# Patient Record
Sex: FEMALE | Race: WHITE | Employment: OTHER | URBAN - METROPOLITAN AREA
[De-identification: names, ages, dates, MRNs, and addresses within clinical notes are randomized per-mention and may not be internally consistent; named-entity substitution may affect disease eponyms.]

---

## 2018-08-06 ENCOUNTER — HOSPITAL ENCOUNTER (OUTPATIENT)
Dept: ULTRASOUND IMAGING | Age: 73
Discharge: HOME | End: 2018-08-08
Payer: COMMERCIAL

## 2018-08-06 DIAGNOSIS — D50.9 IRON DEFICIENCY ANEMIA, UNSPECIFIED IRON DEFICIENCY ANEMIA TYPE: ICD-10-CM

## 2018-08-06 DIAGNOSIS — I73.9 PERIPHERAL VASCULAR DISEASE, UNSPECIFIED (HCC): ICD-10-CM

## 2018-08-06 DIAGNOSIS — E78.5 HYPERLIPIDEMIA, UNSPECIFIED HYPERLIPIDEMIA TYPE: ICD-10-CM

## 2018-08-06 PROCEDURE — 93975 VASCULAR STUDY: CPT

## 2018-08-06 PROCEDURE — 76770 US EXAM ABDO BACK WALL COMP: CPT

## 2019-04-25 ENCOUNTER — HOSPITAL ENCOUNTER (OUTPATIENT)
Dept: ULTRASOUND IMAGING | Age: 74
Discharge: HOME | End: 2019-04-27
Payer: COMMERCIAL

## 2019-04-25 DIAGNOSIS — N18.9 CHRONIC KIDNEY DISEASE, UNSPECIFIED CKD STAGE: ICD-10-CM

## 2019-04-25 PROCEDURE — 76770 US EXAM ABDO BACK WALL COMP: CPT

## 2019-10-30 ENCOUNTER — HOSPITAL ENCOUNTER (OUTPATIENT)
Dept: GENERAL RADIOLOGY | Age: 74
Discharge: HOME | End: 2019-11-01
Payer: COMMERCIAL

## 2019-10-30 ENCOUNTER — HOSPITAL ENCOUNTER (OUTPATIENT)
Age: 74
Discharge: HOME | End: 2019-11-01
Payer: COMMERCIAL

## 2019-10-30 DIAGNOSIS — R05.9 COUGH: ICD-10-CM

## 2019-10-30 PROCEDURE — 71046 X-RAY EXAM CHEST 2 VIEWS: CPT

## 2019-11-09 ENCOUNTER — APPOINTMENT (OUTPATIENT)
Dept: GENERAL RADIOLOGY | Age: 74
DRG: 683 | End: 2019-11-09
Payer: COMMERCIAL

## 2019-11-09 ENCOUNTER — HOSPITAL ENCOUNTER (INPATIENT)
Age: 74
LOS: 3 days | Discharge: HOME HEALTH | DRG: 683 | End: 2019-11-12
Attending: EMERGENCY MEDICINE | Admitting: INTERNAL MEDICINE
Payer: COMMERCIAL

## 2019-11-09 DIAGNOSIS — R53.1 GENERAL WEAKNESS: Primary | ICD-10-CM

## 2019-11-09 DIAGNOSIS — N17.9 AKI (ACUTE KIDNEY INJURY) (HCC): ICD-10-CM

## 2019-11-09 LAB
ANION GAP SERPL CALCULATED.3IONS-SCNC: 19 MMOL/L (ref 7–16)
BACTERIA: NORMAL /HPF
BASOPHILS ABSOLUTE: 0.05 E9/L (ref 0–0.2)
BASOPHILS RELATIVE PERCENT: 0.3 % (ref 0–2)
BILIRUBIN URINE: NEGATIVE
BLOOD, URINE: NEGATIVE
BUN BLDV-MCNC: 50 MG/DL (ref 8–23)
CALCIUM SERPL-MCNC: 10 MG/DL (ref 8.6–10.2)
CHLORIDE BLD-SCNC: 103 MMOL/L (ref 98–107)
CLARITY: CLEAR
CO2: 19 MMOL/L (ref 22–29)
COLOR: YELLOW
CREAT SERPL-MCNC: 2.5 MG/DL (ref 0.5–1)
EOSINOPHILS ABSOLUTE: 0 E9/L (ref 0.05–0.5)
EOSINOPHILS RELATIVE PERCENT: 0 % (ref 0–6)
GFR AFRICAN AMERICAN: 23
GFR NON-AFRICAN AMERICAN: 19 ML/MIN/1.73
GLUCOSE BLD-MCNC: 147 MG/DL (ref 74–99)
GLUCOSE URINE: NEGATIVE MG/DL
HCT VFR BLD CALC: 36.9 % (ref 34–48)
HEMOGLOBIN: 11.4 G/DL (ref 11.5–15.5)
IMMATURE GRANULOCYTES #: 0.15 E9/L
IMMATURE GRANULOCYTES %: 1 % (ref 0–5)
INFLUENZA A BY PCR: NOT DETECTED
INFLUENZA B BY PCR: NOT DETECTED
KETONES, URINE: NEGATIVE MG/DL
LEUKOCYTE ESTERASE, URINE: NEGATIVE
LYMPHOCYTES ABSOLUTE: 1.09 E9/L (ref 1.5–4)
LYMPHOCYTES RELATIVE PERCENT: 7.6 % (ref 20–42)
MCH RBC QN AUTO: 26.6 PG (ref 26–35)
MCHC RBC AUTO-ENTMCNC: 30.9 % (ref 32–34.5)
MCV RBC AUTO: 86 FL (ref 80–99.9)
MONOCYTES ABSOLUTE: 0.47 E9/L (ref 0.1–0.95)
MONOCYTES RELATIVE PERCENT: 3.3 % (ref 2–12)
NEUTROPHILS ABSOLUTE: 12.57 E9/L (ref 1.8–7.3)
NEUTROPHILS RELATIVE PERCENT: 87.8 % (ref 43–80)
NITRITE, URINE: NEGATIVE
PDW BLD-RTO: 17.2 FL (ref 11.5–15)
PH UA: 5 (ref 5–9)
PLATELET # BLD: 257 E9/L (ref 130–450)
PMV BLD AUTO: 10.1 FL (ref 7–12)
POTASSIUM SERPL-SCNC: 5 MMOL/L (ref 3.5–5)
PROTEIN UA: NORMAL MG/DL
RBC # BLD: 4.29 E12/L (ref 3.5–5.5)
RBC UA: NORMAL /HPF (ref 0–2)
SODIUM BLD-SCNC: 141 MMOL/L (ref 132–146)
SPECIFIC GRAVITY UA: >=1.03 (ref 1–1.03)
TROPONIN: <0.01 NG/ML (ref 0–0.03)
TSH SERPL DL<=0.05 MIU/L-ACNC: 2.79 UIU/ML (ref 0.27–4.2)
UROBILINOGEN, URINE: 0.2 E.U./DL
WBC # BLD: 14.3 E9/L (ref 4.5–11.5)
WBC UA: NORMAL /HPF (ref 0–5)

## 2019-11-09 PROCEDURE — 80048 BASIC METABOLIC PNL TOTAL CA: CPT

## 2019-11-09 PROCEDURE — 6360000002 HC RX W HCPCS: Performed by: INTERNAL MEDICINE

## 2019-11-09 PROCEDURE — 84443 ASSAY THYROID STIM HORMONE: CPT

## 2019-11-09 PROCEDURE — 96372 THER/PROPH/DIAG INJ SC/IM: CPT

## 2019-11-09 PROCEDURE — 84484 ASSAY OF TROPONIN QUANT: CPT

## 2019-11-09 PROCEDURE — 87502 INFLUENZA DNA AMP PROBE: CPT

## 2019-11-09 PROCEDURE — 36415 COLL VENOUS BLD VENIPUNCTURE: CPT

## 2019-11-09 PROCEDURE — 96360 HYDRATION IV INFUSION INIT: CPT

## 2019-11-09 PROCEDURE — 81001 URINALYSIS AUTO W/SCOPE: CPT

## 2019-11-09 PROCEDURE — 93005 ELECTROCARDIOGRAM TRACING: CPT | Performed by: PREVENTIVE MEDICINE

## 2019-11-09 PROCEDURE — 2580000003 HC RX 258: Performed by: INTERNAL MEDICINE

## 2019-11-09 PROCEDURE — 85025 COMPLETE CBC W/AUTO DIFF WBC: CPT

## 2019-11-09 PROCEDURE — 99285 EMERGENCY DEPT VISIT HI MDM: CPT

## 2019-11-09 PROCEDURE — 96361 HYDRATE IV INFUSION ADD-ON: CPT

## 2019-11-09 PROCEDURE — 1200000000 HC SEMI PRIVATE

## 2019-11-09 PROCEDURE — 2580000003 HC RX 258: Performed by: PREVENTIVE MEDICINE

## 2019-11-09 PROCEDURE — 71045 X-RAY EXAM CHEST 1 VIEW: CPT

## 2019-11-09 PROCEDURE — 6370000000 HC RX 637 (ALT 250 FOR IP): Performed by: INTERNAL MEDICINE

## 2019-11-09 PROCEDURE — G0378 HOSPITAL OBSERVATION PER HR: HCPCS

## 2019-11-09 RX ORDER — SODIUM CHLORIDE 9 MG/ML
INJECTION, SOLUTION INTRAVENOUS CONTINUOUS
Status: DISCONTINUED | OUTPATIENT
Start: 2019-11-09 | End: 2019-11-12 | Stop reason: HOSPADM

## 2019-11-09 RX ORDER — DEXTROSE MONOHYDRATE 50 MG/ML
100 INJECTION, SOLUTION INTRAVENOUS PRN
Status: DISCONTINUED | OUTPATIENT
Start: 2019-11-09 | End: 2019-11-12 | Stop reason: HOSPADM

## 2019-11-09 RX ORDER — SIMVASTATIN 20 MG
20 TABLET ORAL NIGHTLY
Status: DISCONTINUED | OUTPATIENT
Start: 2019-11-09 | End: 2019-11-12 | Stop reason: HOSPADM

## 2019-11-09 RX ORDER — DOXYCYCLINE HYCLATE 100 MG/1
100 CAPSULE ORAL 2 TIMES DAILY
Status: ON HOLD | COMMUNITY
End: 2019-11-12 | Stop reason: HOSPADM

## 2019-11-09 RX ORDER — SODIUM CHLORIDE 0.9 % (FLUSH) 0.9 %
10 SYRINGE (ML) INJECTION PRN
Status: DISCONTINUED | OUTPATIENT
Start: 2019-11-09 | End: 2019-11-12 | Stop reason: HOSPADM

## 2019-11-09 RX ORDER — CODEINE/BUTALBITAL/ASA/CAFFEIN 30-50-325
1 CAPSULE ORAL ONCE
Status: DISCONTINUED | OUTPATIENT
Start: 2019-11-09 | End: 2019-11-09 | Stop reason: CLARIF

## 2019-11-09 RX ORDER — PANTOPRAZOLE SODIUM 20 MG/1
20 TABLET, DELAYED RELEASE ORAL DAILY
Status: DISCONTINUED | OUTPATIENT
Start: 2019-11-10 | End: 2019-11-12 | Stop reason: HOSPADM

## 2019-11-09 RX ORDER — MIRTAZAPINE 15 MG/1
15 TABLET, ORALLY DISINTEGRATING ORAL NIGHTLY
Status: ON HOLD | COMMUNITY
End: 2020-09-29 | Stop reason: DRUGHIGH

## 2019-11-09 RX ORDER — CODEINE/BUTALBITAL/ASA/CAFFEIN 30-50-325
1 CAPSULE ORAL EVERY 4 HOURS PRN
Status: DISCONTINUED | OUTPATIENT
Start: 2019-11-09 | End: 2019-11-12 | Stop reason: HOSPADM

## 2019-11-09 RX ORDER — 0.9 % SODIUM CHLORIDE 0.9 %
500 INTRAVENOUS SOLUTION INTRAVENOUS ONCE
Status: COMPLETED | OUTPATIENT
Start: 2019-11-09 | End: 2019-11-09

## 2019-11-09 RX ORDER — NICOTINE POLACRILEX 4 MG
15 LOZENGE BUCCAL PRN
Status: DISCONTINUED | OUTPATIENT
Start: 2019-11-09 | End: 2019-11-12 | Stop reason: HOSPADM

## 2019-11-09 RX ORDER — LEVOTHYROXINE SODIUM 88 UG/1
88 TABLET ORAL DAILY
Status: DISCONTINUED | OUTPATIENT
Start: 2019-11-10 | End: 2019-11-12 | Stop reason: HOSPADM

## 2019-11-09 RX ORDER — HEPARIN SODIUM 5000 [USP'U]/ML
5000 INJECTION, SOLUTION INTRAVENOUS; SUBCUTANEOUS EVERY 8 HOURS SCHEDULED
Status: DISCONTINUED | OUTPATIENT
Start: 2019-11-09 | End: 2019-11-12 | Stop reason: HOSPADM

## 2019-11-09 RX ORDER — ROPINIROLE 1 MG/1
1 TABLET, FILM COATED ORAL NIGHTLY
Status: ON HOLD | COMMUNITY
End: 2020-09-29 | Stop reason: ALTCHOICE

## 2019-11-09 RX ORDER — SODIUM CHLORIDE 0.9 % (FLUSH) 0.9 %
10 SYRINGE (ML) INJECTION EVERY 12 HOURS SCHEDULED
Status: DISCONTINUED | OUTPATIENT
Start: 2019-11-09 | End: 2019-11-12 | Stop reason: HOSPADM

## 2019-11-09 RX ORDER — ROPINIROLE 1 MG/1
1 TABLET, FILM COATED ORAL NIGHTLY
Status: DISCONTINUED | OUTPATIENT
Start: 2019-11-09 | End: 2019-11-12 | Stop reason: HOSPADM

## 2019-11-09 RX ORDER — DEXTROSE MONOHYDRATE 25 G/50ML
12.5 INJECTION, SOLUTION INTRAVENOUS PRN
Status: DISCONTINUED | OUTPATIENT
Start: 2019-11-09 | End: 2019-11-12 | Stop reason: HOSPADM

## 2019-11-09 RX ADMIN — ROPINIROLE HYDROCHLORIDE 1 MG: 1 TABLET, FILM COATED ORAL at 23:27

## 2019-11-09 RX ADMIN — HEPARIN SODIUM 5000 UNITS: 5000 INJECTION INTRAVENOUS; SUBCUTANEOUS at 23:27

## 2019-11-09 RX ADMIN — SODIUM CHLORIDE: 9 INJECTION, SOLUTION INTRAVENOUS at 23:27

## 2019-11-09 RX ADMIN — Medication 10 ML: at 23:27

## 2019-11-09 RX ADMIN — SIMVASTATIN 20 MG: 20 TABLET, FILM COATED ORAL at 23:27

## 2019-11-09 RX ADMIN — SODIUM CHLORIDE 500 ML: 9 INJECTION, SOLUTION INTRAVENOUS at 17:57

## 2019-11-09 ASSESSMENT — ENCOUNTER SYMPTOMS
ABDOMINAL PAIN: 0
CONSTIPATION: 0
VOMITING: 0
SHORTNESS OF BREATH: 0
ALLERGIC/IMMUNOLOGIC NEGATIVE: 1
NAUSEA: 0
CHEST TIGHTNESS: 0
COUGH: 0
DIARRHEA: 0

## 2019-11-09 ASSESSMENT — PAIN SCALES - GENERAL: PAINLEVEL_OUTOF10: 5

## 2019-11-09 ASSESSMENT — PAIN - FUNCTIONAL ASSESSMENT: PAIN_FUNCTIONAL_ASSESSMENT: PREVENTS OR INTERFERES SOME ACTIVE ACTIVITIES AND ADLS

## 2019-11-09 ASSESSMENT — PAIN DESCRIPTION - LOCATION: LOCATION: HEAD

## 2019-11-09 ASSESSMENT — PAIN DESCRIPTION - FREQUENCY: FREQUENCY: CONTINUOUS

## 2019-11-09 ASSESSMENT — PAIN DESCRIPTION - ONSET: ONSET: ON-GOING

## 2019-11-09 ASSESSMENT — PAIN DESCRIPTION - DESCRIPTORS: DESCRIPTORS: ACHING;DISCOMFORT

## 2019-11-09 ASSESSMENT — PAIN DESCRIPTION - PAIN TYPE: TYPE: ACUTE PAIN

## 2019-11-10 ENCOUNTER — APPOINTMENT (OUTPATIENT)
Dept: CT IMAGING | Age: 74
DRG: 683 | End: 2019-11-10
Payer: COMMERCIAL

## 2019-11-10 LAB
ALBUMIN SERPL-MCNC: 3.2 G/DL (ref 3.5–5.2)
ALP BLD-CCNC: 154 U/L (ref 35–104)
ALT SERPL-CCNC: 12 U/L (ref 0–32)
ANION GAP SERPL CALCULATED.3IONS-SCNC: 13 MMOL/L (ref 7–16)
AST SERPL-CCNC: 17 U/L (ref 0–31)
BILIRUB SERPL-MCNC: 0.2 MG/DL (ref 0–1.2)
BILIRUBIN DIRECT: <0.2 MG/DL (ref 0–0.3)
BILIRUBIN, INDIRECT: ABNORMAL MG/DL (ref 0–1)
BUN BLDV-MCNC: 45 MG/DL (ref 8–23)
CALCIUM SERPL-MCNC: 8.6 MG/DL (ref 8.6–10.2)
CHLORIDE BLD-SCNC: 105 MMOL/L (ref 98–107)
CO2: 19 MMOL/L (ref 22–29)
CREAT SERPL-MCNC: 2.1 MG/DL (ref 0.5–1)
EKG ATRIAL RATE: 82 BPM
EKG P AXIS: 61 DEGREES
EKG P-R INTERVAL: 162 MS
EKG Q-T INTERVAL: 346 MS
EKG QRS DURATION: 72 MS
EKG QTC CALCULATION (BAZETT): 404 MS
EKG R AXIS: 4 DEGREES
EKG T AXIS: 53 DEGREES
EKG VENTRICULAR RATE: 82 BPM
GFR AFRICAN AMERICAN: 28
GFR NON-AFRICAN AMERICAN: 23 ML/MIN/1.73
GLUCOSE BLD-MCNC: 102 MG/DL (ref 74–99)
HCT VFR BLD CALC: 28.6 % (ref 34–48)
HEMOGLOBIN: 9.2 G/DL (ref 11.5–15.5)
MAGNESIUM: 2.2 MG/DL (ref 1.6–2.6)
MCH RBC QN AUTO: 26.9 PG (ref 26–35)
MCHC RBC AUTO-ENTMCNC: 32.2 % (ref 32–34.5)
MCV RBC AUTO: 83.6 FL (ref 80–99.9)
PDW BLD-RTO: 16.8 FL (ref 11.5–15)
PHOSPHORUS: 4.2 MG/DL (ref 2.5–4.5)
PLATELET # BLD: 184 E9/L (ref 130–450)
PMV BLD AUTO: 10.5 FL (ref 7–12)
POTASSIUM SERPL-SCNC: 4.2 MMOL/L (ref 3.5–5)
RBC # BLD: 3.42 E12/L (ref 3.5–5.5)
SODIUM BLD-SCNC: 137 MMOL/L (ref 132–146)
TOTAL PROTEIN: 5.7 G/DL (ref 6.4–8.3)
TSH SERPL DL<=0.05 MIU/L-ACNC: 1.51 UIU/ML (ref 0.27–4.2)
WBC # BLD: 12 E9/L (ref 4.5–11.5)

## 2019-11-10 PROCEDURE — 80076 HEPATIC FUNCTION PANEL: CPT

## 2019-11-10 PROCEDURE — G0378 HOSPITAL OBSERVATION PER HR: HCPCS

## 2019-11-10 PROCEDURE — 84100 ASSAY OF PHOSPHORUS: CPT

## 2019-11-10 PROCEDURE — 6370000000 HC RX 637 (ALT 250 FOR IP): Performed by: INTERNAL MEDICINE

## 2019-11-10 PROCEDURE — 85027 COMPLETE CBC AUTOMATED: CPT

## 2019-11-10 PROCEDURE — 90653 IIV ADJUVANT VACCINE IM: CPT | Performed by: INTERNAL MEDICINE

## 2019-11-10 PROCEDURE — 2580000003 HC RX 258: Performed by: INTERNAL MEDICINE

## 2019-11-10 PROCEDURE — 96372 THER/PROPH/DIAG INJ SC/IM: CPT

## 2019-11-10 PROCEDURE — G0008 ADMIN INFLUENZA VIRUS VAC: HCPCS | Performed by: INTERNAL MEDICINE

## 2019-11-10 PROCEDURE — 80048 BASIC METABOLIC PNL TOTAL CA: CPT

## 2019-11-10 PROCEDURE — 97116 GAIT TRAINING THERAPY: CPT | Performed by: PHYSICAL THERAPIST

## 2019-11-10 PROCEDURE — 0100U HC RESPIRPTHGN MULT REV TRANS & AMP PRB TECH 21 TRGT: CPT

## 2019-11-10 PROCEDURE — 36415 COLL VENOUS BLD VENIPUNCTURE: CPT

## 2019-11-10 PROCEDURE — 84443 ASSAY THYROID STIM HORMONE: CPT

## 2019-11-10 PROCEDURE — 6360000002 HC RX W HCPCS: Performed by: INTERNAL MEDICINE

## 2019-11-10 PROCEDURE — 6360000004 HC RX CONTRAST MEDICATION: Performed by: RADIOLOGY

## 2019-11-10 PROCEDURE — 83735 ASSAY OF MAGNESIUM: CPT

## 2019-11-10 PROCEDURE — 1200000000 HC SEMI PRIVATE

## 2019-11-10 PROCEDURE — 74176 CT ABD & PELVIS W/O CONTRAST: CPT

## 2019-11-10 PROCEDURE — 97161 PT EVAL LOW COMPLEX 20 MIN: CPT | Performed by: PHYSICAL THERAPIST

## 2019-11-10 RX ORDER — SODIUM BICARBONATE 650 MG/1
650 TABLET ORAL 2 TIMES DAILY
Status: COMPLETED | OUTPATIENT
Start: 2019-11-10 | End: 2019-11-11

## 2019-11-10 RX ORDER — MIRTAZAPINE 15 MG/1
15 TABLET, ORALLY DISINTEGRATING ORAL NIGHTLY
Status: DISCONTINUED | OUTPATIENT
Start: 2019-11-10 | End: 2019-11-12 | Stop reason: HOSPADM

## 2019-11-10 RX ADMIN — SIMVASTATIN 20 MG: 20 TABLET, FILM COATED ORAL at 21:30

## 2019-11-10 RX ADMIN — HEPARIN SODIUM 5000 UNITS: 5000 INJECTION INTRAVENOUS; SUBCUTANEOUS at 21:31

## 2019-11-10 RX ADMIN — ROPINIROLE HYDROCHLORIDE 1 MG: 1 TABLET, FILM COATED ORAL at 21:30

## 2019-11-10 RX ADMIN — MIRTAZAPINE 15 MG: 15 TABLET, ORALLY DISINTEGRATING ORAL at 21:30

## 2019-11-10 RX ADMIN — SODIUM CHLORIDE: 9 INJECTION, SOLUTION INTRAVENOUS at 14:39

## 2019-11-10 RX ADMIN — HEPARIN SODIUM 5000 UNITS: 5000 INJECTION INTRAVENOUS; SUBCUTANEOUS at 14:35

## 2019-11-10 RX ADMIN — LEVOTHYROXINE SODIUM 88 MCG: 88 TABLET ORAL at 06:07

## 2019-11-10 RX ADMIN — Medication 1 CAPSULE: at 21:28

## 2019-11-10 RX ADMIN — SODIUM BICARBONATE 650 MG TABLET 650 MG: at 21:30

## 2019-11-10 RX ADMIN — HEPARIN SODIUM 5000 UNITS: 5000 INJECTION INTRAVENOUS; SUBCUTANEOUS at 06:07

## 2019-11-10 RX ADMIN — PANTOPRAZOLE SODIUM 20 MG: 20 TABLET, DELAYED RELEASE ORAL at 08:34

## 2019-11-10 RX ADMIN — Medication 1 CAPSULE: at 00:01

## 2019-11-10 RX ADMIN — SODIUM BICARBONATE 650 MG TABLET 650 MG: at 10:48

## 2019-11-10 RX ADMIN — SODIUM CHLORIDE: 9 INJECTION, SOLUTION INTRAVENOUS at 06:08

## 2019-11-10 RX ADMIN — IOHEXOL 50 ML: 240 INJECTION, SOLUTION INTRATHECAL; INTRAVASCULAR; INTRAVENOUS; ORAL at 15:18

## 2019-11-10 RX ADMIN — INFLUENZA VACCINE, ADJUVANTED 0.5 ML: 15; 15; 15 INJECTION, SUSPENSION INTRAMUSCULAR at 08:34

## 2019-11-10 RX ADMIN — Medication 1 CAPSULE: at 08:51

## 2019-11-10 ASSESSMENT — PAIN DESCRIPTION - FREQUENCY
FREQUENCY: INTERMITTENT
FREQUENCY: CONTINUOUS

## 2019-11-10 ASSESSMENT — PAIN DESCRIPTION - DESCRIPTORS
DESCRIPTORS: HEADACHE
DESCRIPTORS: ACHING;DISCOMFORT;HEADACHE
DESCRIPTORS: ACHING;DISCOMFORT;DULL

## 2019-11-10 ASSESSMENT — PAIN DESCRIPTION - ONSET
ONSET: ON-GOING
ONSET: ON-GOING
ONSET: GRADUAL
ONSET: GRADUAL

## 2019-11-10 ASSESSMENT — PAIN DESCRIPTION - ORIENTATION
ORIENTATION: INNER

## 2019-11-10 ASSESSMENT — PAIN DESCRIPTION - LOCATION
LOCATION: HEAD
LOCATION: ABDOMEN;HEAD

## 2019-11-10 ASSESSMENT — PAIN - FUNCTIONAL ASSESSMENT
PAIN_FUNCTIONAL_ASSESSMENT: PREVENTS OR INTERFERES SOME ACTIVE ACTIVITIES AND ADLS

## 2019-11-10 ASSESSMENT — PAIN SCALES - GENERAL
PAINLEVEL_OUTOF10: 6
PAINLEVEL_OUTOF10: 3
PAINLEVEL_OUTOF10: 7
PAINLEVEL_OUTOF10: 5
PAINLEVEL_OUTOF10: 5
PAINLEVEL_OUTOF10: 6
PAINLEVEL_OUTOF10: 3

## 2019-11-10 ASSESSMENT — PAIN DESCRIPTION - PAIN TYPE
TYPE: CHRONIC PAIN
TYPE: ACUTE PAIN;CHRONIC PAIN
TYPE: CHRONIC PAIN
TYPE: CHRONIC PAIN

## 2019-11-10 ASSESSMENT — PAIN DESCRIPTION - PROGRESSION
CLINICAL_PROGRESSION: OTHER (COMMENT)
CLINICAL_PROGRESSION: NOT CHANGED

## 2019-11-11 LAB
ADENOVIRUS BY PCR: NOT DETECTED
ANION GAP SERPL CALCULATED.3IONS-SCNC: 13 MMOL/L (ref 7–16)
BORDETELLA PARAPERTUSSIS BY PCR: NOT DETECTED
BORDETELLA PERTUSSIS BY PCR: NOT DETECTED
BUN BLDV-MCNC: 31 MG/DL (ref 8–23)
CALCIUM SERPL-MCNC: 8.1 MG/DL (ref 8.6–10.2)
CHLAMYDOPHILIA PNEUMONIAE BY PCR: NOT DETECTED
CHLORIDE BLD-SCNC: 107 MMOL/L (ref 98–107)
CO2: 20 MMOL/L (ref 22–29)
CORONAVIRUS 229E BY PCR: NOT DETECTED
CORONAVIRUS HKU1 BY PCR: NOT DETECTED
CORONAVIRUS NL63 BY PCR: NOT DETECTED
CORONAVIRUS OC43 BY PCR: NOT DETECTED
CREAT SERPL-MCNC: 1.7 MG/DL (ref 0.5–1)
FERRITIN: 23 NG/ML
FOLATE: 11 NG/ML (ref 4.8–24.2)
GFR AFRICAN AMERICAN: 35
GFR NON-AFRICAN AMERICAN: 29 ML/MIN/1.73
GLUCOSE BLD-MCNC: 100 MG/DL (ref 74–99)
HCT VFR BLD CALC: 25.5 % (ref 34–48)
HCT VFR BLD CALC: 27.5 % (ref 34–48)
HEMOGLOBIN: 8 G/DL (ref 11.5–15.5)
HEMOGLOBIN: 8.5 G/DL (ref 11.5–15.5)
HUMAN METAPNEUMOVIRUS BY PCR: NOT DETECTED
HUMAN RHINOVIRUS/ENTEROVIRUS BY PCR: NOT DETECTED
IMMATURE RETIC FRACT: 21 % (ref 3–15.9)
INFLUENZA A BY PCR: NOT DETECTED
INFLUENZA B BY PCR: NOT DETECTED
IRON SATURATION: 13 % (ref 15–50)
IRON: 26 MCG/DL (ref 37–145)
LV EF: 81 %
LVEF MODALITY: NORMAL
MCH RBC QN AUTO: 26.9 PG (ref 26–35)
MCHC RBC AUTO-ENTMCNC: 31.4 % (ref 32–34.5)
MCV RBC AUTO: 85.9 FL (ref 80–99.9)
MYCOPLASMA PNEUMONIAE BY PCR: NOT DETECTED
PARAINFLUENZA VIRUS 1 BY PCR: NOT DETECTED
PARAINFLUENZA VIRUS 2 BY PCR: NOT DETECTED
PARAINFLUENZA VIRUS 3 BY PCR: NOT DETECTED
PARAINFLUENZA VIRUS 4 BY PCR: NOT DETECTED
PDW BLD-RTO: 17.2 FL (ref 11.5–15)
PLATELET # BLD: 173 E9/L (ref 130–450)
PMV BLD AUTO: 11.1 FL (ref 7–12)
POTASSIUM SERPL-SCNC: 3.7 MMOL/L (ref 3.5–5)
RBC # BLD: 2.97 E12/L (ref 3.5–5.5)
RESPIRATORY SYNCYTIAL VIRUS BY PCR: NOT DETECTED
RETIC HGB EQUIVALENT: 35.1 PG (ref 28.2–36.6)
RETICULOCYTE ABSOLUTE COUNT: 0.06 E12/L
RETICULOCYTE COUNT PCT: 1.7 % (ref 0.4–1.9)
SODIUM BLD-SCNC: 140 MMOL/L (ref 132–146)
TOTAL IRON BINDING CAPACITY: 202 MCG/DL (ref 250–450)
VITAMIN B-12: 1362 PG/ML (ref 211–946)
WBC # BLD: 7.5 E9/L (ref 4.5–11.5)

## 2019-11-11 PROCEDURE — G0378 HOSPITAL OBSERVATION PER HR: HCPCS

## 2019-11-11 PROCEDURE — 97530 THERAPEUTIC ACTIVITIES: CPT

## 2019-11-11 PROCEDURE — 85018 HEMOGLOBIN: CPT

## 2019-11-11 PROCEDURE — 2580000003 HC RX 258: Performed by: INTERNAL MEDICINE

## 2019-11-11 PROCEDURE — 93306 TTE W/DOPPLER COMPLETE: CPT

## 2019-11-11 PROCEDURE — 97116 GAIT TRAINING THERAPY: CPT

## 2019-11-11 PROCEDURE — 6370000000 HC RX 637 (ALT 250 FOR IP): Performed by: INTERNAL MEDICINE

## 2019-11-11 PROCEDURE — 82607 VITAMIN B-12: CPT

## 2019-11-11 PROCEDURE — 83550 IRON BINDING TEST: CPT

## 2019-11-11 PROCEDURE — 85045 AUTOMATED RETICULOCYTE COUNT: CPT

## 2019-11-11 PROCEDURE — 85027 COMPLETE CBC AUTOMATED: CPT

## 2019-11-11 PROCEDURE — 83540 ASSAY OF IRON: CPT

## 2019-11-11 PROCEDURE — 80048 BASIC METABOLIC PNL TOTAL CA: CPT

## 2019-11-11 PROCEDURE — 6360000002 HC RX W HCPCS: Performed by: INTERNAL MEDICINE

## 2019-11-11 PROCEDURE — 82746 ASSAY OF FOLIC ACID SERUM: CPT

## 2019-11-11 PROCEDURE — 97535 SELF CARE MNGMENT TRAINING: CPT

## 2019-11-11 PROCEDURE — 96372 THER/PROPH/DIAG INJ SC/IM: CPT

## 2019-11-11 PROCEDURE — 85014 HEMATOCRIT: CPT

## 2019-11-11 PROCEDURE — 97165 OT EVAL LOW COMPLEX 30 MIN: CPT

## 2019-11-11 PROCEDURE — 82728 ASSAY OF FERRITIN: CPT

## 2019-11-11 PROCEDURE — 36415 COLL VENOUS BLD VENIPUNCTURE: CPT

## 2019-11-11 PROCEDURE — 1200000000 HC SEMI PRIVATE

## 2019-11-11 RX ADMIN — HEPARIN SODIUM 5000 UNITS: 5000 INJECTION INTRAVENOUS; SUBCUTANEOUS at 06:16

## 2019-11-11 RX ADMIN — SODIUM CHLORIDE: 9 INJECTION, SOLUTION INTRAVENOUS at 16:10

## 2019-11-11 RX ADMIN — SODIUM CHLORIDE: 9 INJECTION, SOLUTION INTRAVENOUS at 03:15

## 2019-11-11 RX ADMIN — Medication 1 CAPSULE: at 03:11

## 2019-11-11 RX ADMIN — PANTOPRAZOLE SODIUM 20 MG: 20 TABLET, DELAYED RELEASE ORAL at 08:03

## 2019-11-11 RX ADMIN — HEPARIN SODIUM 5000 UNITS: 5000 INJECTION INTRAVENOUS; SUBCUTANEOUS at 21:36

## 2019-11-11 RX ADMIN — Medication 1 CAPSULE: at 14:49

## 2019-11-11 RX ADMIN — SODIUM BICARBONATE 650 MG TABLET 650 MG: at 21:32

## 2019-11-11 RX ADMIN — ROPINIROLE HYDROCHLORIDE 1 MG: 1 TABLET, FILM COATED ORAL at 21:32

## 2019-11-11 RX ADMIN — MIRTAZAPINE 15 MG: 15 TABLET, ORALLY DISINTEGRATING ORAL at 21:35

## 2019-11-11 RX ADMIN — HEPARIN SODIUM 5000 UNITS: 5000 INJECTION INTRAVENOUS; SUBCUTANEOUS at 14:52

## 2019-11-11 RX ADMIN — SIMVASTATIN 20 MG: 20 TABLET, FILM COATED ORAL at 21:32

## 2019-11-11 RX ADMIN — Medication 1 CAPSULE: at 21:33

## 2019-11-11 RX ADMIN — LEVOTHYROXINE SODIUM 88 MCG: 88 TABLET ORAL at 06:17

## 2019-11-11 RX ADMIN — SODIUM BICARBONATE 650 MG TABLET 650 MG: at 08:03

## 2019-11-11 ASSESSMENT — PAIN SCALES - GENERAL
PAINLEVEL_OUTOF10: 0
PAINLEVEL_OUTOF10: 8
PAINLEVEL_OUTOF10: 8
PAINLEVEL_OUTOF10: 2
PAINLEVEL_OUTOF10: 0
PAINLEVEL_OUTOF10: 9

## 2019-11-12 VITALS
TEMPERATURE: 97.7 F | RESPIRATION RATE: 18 BRPM | HEART RATE: 65 BPM | OXYGEN SATURATION: 96 % | DIASTOLIC BLOOD PRESSURE: 68 MMHG | BODY MASS INDEX: 20.65 KG/M2 | WEIGHT: 112.2 LBS | SYSTOLIC BLOOD PRESSURE: 142 MMHG | HEIGHT: 62 IN

## 2019-11-12 LAB
ANION GAP SERPL CALCULATED.3IONS-SCNC: 10 MMOL/L (ref 7–16)
BUN BLDV-MCNC: 19 MG/DL (ref 8–23)
CALCIUM SERPL-MCNC: 7.6 MG/DL (ref 8.6–10.2)
CHLORIDE BLD-SCNC: 112 MMOL/L (ref 98–107)
CO2: 21 MMOL/L (ref 22–29)
CREAT SERPL-MCNC: 1.6 MG/DL (ref 0.5–1)
GFR AFRICAN AMERICAN: 38
GFR NON-AFRICAN AMERICAN: 31 ML/MIN/1.73
GLUCOSE BLD-MCNC: 98 MG/DL (ref 74–99)
HCT VFR BLD CALC: 26 % (ref 34–48)
HEMOGLOBIN: 8.1 G/DL (ref 11.5–15.5)
MCH RBC QN AUTO: 26.6 PG (ref 26–35)
MCHC RBC AUTO-ENTMCNC: 31.2 % (ref 32–34.5)
MCV RBC AUTO: 85.5 FL (ref 80–99.9)
PDW BLD-RTO: 17.4 FL (ref 11.5–15)
PLATELET # BLD: 176 E9/L (ref 130–450)
PMV BLD AUTO: 11.2 FL (ref 7–12)
POTASSIUM SERPL-SCNC: 3.6 MMOL/L (ref 3.5–5)
RBC # BLD: 3.04 E12/L (ref 3.5–5.5)
SODIUM BLD-SCNC: 143 MMOL/L (ref 132–146)
WBC # BLD: 6.7 E9/L (ref 4.5–11.5)

## 2019-11-12 PROCEDURE — 96372 THER/PROPH/DIAG INJ SC/IM: CPT

## 2019-11-12 PROCEDURE — 80048 BASIC METABOLIC PNL TOTAL CA: CPT

## 2019-11-12 PROCEDURE — 97530 THERAPEUTIC ACTIVITIES: CPT

## 2019-11-12 PROCEDURE — 6370000000 HC RX 637 (ALT 250 FOR IP): Performed by: INTERNAL MEDICINE

## 2019-11-12 PROCEDURE — 97535 SELF CARE MNGMENT TRAINING: CPT

## 2019-11-12 PROCEDURE — 97116 GAIT TRAINING THERAPY: CPT

## 2019-11-12 PROCEDURE — 36415 COLL VENOUS BLD VENIPUNCTURE: CPT

## 2019-11-12 PROCEDURE — 85027 COMPLETE CBC AUTOMATED: CPT

## 2019-11-12 PROCEDURE — 6360000002 HC RX W HCPCS: Performed by: INTERNAL MEDICINE

## 2019-11-12 PROCEDURE — G0378 HOSPITAL OBSERVATION PER HR: HCPCS

## 2019-11-12 RX ORDER — PANTOPRAZOLE SODIUM 20 MG/1
20 TABLET, DELAYED RELEASE ORAL DAILY
Qty: 30 TABLET | Refills: 3 | Status: ON HOLD | OUTPATIENT
Start: 2019-11-13 | End: 2020-09-29 | Stop reason: DRUGHIGH

## 2019-11-12 RX ADMIN — Medication 1 CAPSULE: at 02:47

## 2019-11-12 RX ADMIN — PANTOPRAZOLE SODIUM 20 MG: 20 TABLET, DELAYED RELEASE ORAL at 08:21

## 2019-11-12 RX ADMIN — Medication 1 CAPSULE: at 11:06

## 2019-11-12 RX ADMIN — LEVOTHYROXINE SODIUM 88 MCG: 88 TABLET ORAL at 06:37

## 2019-11-12 RX ADMIN — HEPARIN SODIUM 5000 UNITS: 5000 INJECTION INTRAVENOUS; SUBCUTANEOUS at 06:30

## 2019-11-12 ASSESSMENT — PAIN DESCRIPTION - ONSET: ONSET: ON-GOING

## 2019-11-12 ASSESSMENT — PAIN DESCRIPTION - FREQUENCY: FREQUENCY: CONTINUOUS

## 2019-11-12 ASSESSMENT — PAIN SCALES - GENERAL
PAINLEVEL_OUTOF10: 0
PAINLEVEL_OUTOF10: 9
PAINLEVEL_OUTOF10: 7
PAINLEVEL_OUTOF10: 0

## 2019-11-12 ASSESSMENT — PAIN DESCRIPTION - DESCRIPTORS: DESCRIPTORS: HEADACHE

## 2019-11-12 ASSESSMENT — PAIN DESCRIPTION - PROGRESSION: CLINICAL_PROGRESSION: NOT CHANGED

## 2019-11-12 ASSESSMENT — PAIN DESCRIPTION - PAIN TYPE: TYPE: ACUTE PAIN

## 2019-11-12 ASSESSMENT — PAIN DESCRIPTION - ORIENTATION: ORIENTATION: INNER

## 2019-11-12 ASSESSMENT — PAIN DESCRIPTION - LOCATION: LOCATION: HEAD

## 2020-06-03 ENCOUNTER — HOSPITAL ENCOUNTER (OUTPATIENT)
Dept: ULTRASOUND IMAGING | Age: 75
Discharge: HOME | End: 2020-06-05
Payer: MEDICARE

## 2020-06-03 ENCOUNTER — HOSPITAL ENCOUNTER (OUTPATIENT)
Age: 75
Discharge: HOME | End: 2020-06-05
Payer: MEDICARE

## 2020-06-03 PROCEDURE — 93971 EXTREMITY STUDY: CPT

## 2020-08-21 ENCOUNTER — HOSPITAL ENCOUNTER (OUTPATIENT)
Dept: GENERAL RADIOLOGY | Age: 75
Discharge: HOME | End: 2020-08-23
Payer: MEDICARE

## 2020-08-21 ENCOUNTER — HOSPITAL ENCOUNTER (OUTPATIENT)
Dept: CT IMAGING | Age: 75
Discharge: HOME | End: 2020-08-23
Payer: MEDICARE

## 2020-08-21 ENCOUNTER — HOSPITAL ENCOUNTER (OUTPATIENT)
Age: 75
Discharge: HOME | End: 2020-08-23
Payer: MEDICARE

## 2020-08-21 ENCOUNTER — HOSPITAL ENCOUNTER (OUTPATIENT)
Dept: ULTRASOUND IMAGING | Age: 75
Discharge: HOME | End: 2020-08-23
Payer: MEDICARE

## 2020-08-21 PROCEDURE — 70450 CT HEAD/BRAIN W/O DYE: CPT

## 2020-08-21 PROCEDURE — 93880 EXTRACRANIAL BILAT STUDY: CPT

## 2020-08-21 PROCEDURE — 73610 X-RAY EXAM OF ANKLE: CPT

## 2020-08-24 ENCOUNTER — TELEPHONE (OUTPATIENT)
Dept: CARDIOLOGY | Age: 75
End: 2020-08-24

## 2020-08-24 NOTE — TELEPHONE ENCOUNTER
1ST ATTEMPT TO CALL AND SCHEDULE ECHO AND STRESS TEST.  LEFT MESSAGE FOR PT TO CALL BACK AND SCHEDULE

## 2020-09-03 ENCOUNTER — TELEPHONE (OUTPATIENT)
Dept: CARDIOLOGY | Age: 75
End: 2020-09-03

## 2020-09-09 ENCOUNTER — TELEPHONE (OUTPATIENT)
Dept: CARDIOLOGY | Age: 75
End: 2020-09-09

## 2020-09-09 NOTE — TELEPHONE ENCOUNTER
3RD AND FINAL ATTEMPT TO CALL AND SCHEDULE FOR ECHO AND STRESS APPT. LEFT MESSAGE TO CALL AND SCHEDULE.

## 2020-09-29 ENCOUNTER — HOSPITAL ENCOUNTER (INPATIENT)
Age: 75
LOS: 5 days | Discharge: HOME | DRG: 292 | End: 2020-10-05
Attending: EMERGENCY MEDICINE | Admitting: INTERNAL MEDICINE
Payer: MEDICARE

## 2020-09-29 ENCOUNTER — APPOINTMENT (OUTPATIENT)
Dept: GENERAL RADIOLOGY | Age: 75
DRG: 292 | End: 2020-09-29
Payer: MEDICARE

## 2020-09-29 PROBLEM — I50.33 ACUTE ON CHRONIC DIASTOLIC (CONGESTIVE) HEART FAILURE (HCC): Status: ACTIVE | Noted: 2020-09-29

## 2020-09-29 LAB
ANION GAP SERPL CALCULATED.3IONS-SCNC: 17 MMOL/L (ref 7–16)
ANISOCYTOSIS: ABNORMAL
BASOPHILS ABSOLUTE: 0 E9/L (ref 0–0.2)
BASOPHILS RELATIVE PERCENT: 0 % (ref 0–2)
BUN BLDV-MCNC: 16 MG/DL (ref 8–23)
CALCIUM SERPL-MCNC: 9.2 MG/DL (ref 8.6–10.2)
CHLORIDE BLD-SCNC: 102 MMOL/L (ref 98–107)
CO2: 20 MMOL/L (ref 22–29)
CREAT SERPL-MCNC: 1.7 MG/DL (ref 0.5–1)
EKG ATRIAL RATE: 87 BPM
EKG P AXIS: 62 DEGREES
EKG P-R INTERVAL: 162 MS
EKG Q-T INTERVAL: 366 MS
EKG QRS DURATION: 74 MS
EKG QTC CALCULATION (BAZETT): 440 MS
EKG R AXIS: -39 DEGREES
EKG T AXIS: 89 DEGREES
EKG VENTRICULAR RATE: 87 BPM
EOSINOPHILS ABSOLUTE: 0 E9/L (ref 0.05–0.5)
EOSINOPHILS RELATIVE PERCENT: 0 % (ref 0–6)
FERRITIN: 36 NG/ML
GFR AFRICAN AMERICAN: 35
GFR NON-AFRICAN AMERICAN: 29 ML/MIN/1.73
GLUCOSE BLD-MCNC: 128 MG/DL (ref 74–99)
HCT VFR BLD CALC: 27.9 % (ref 34–48)
HCT VFR BLD CALC: 29 % (ref 34–48)
HEMOGLOBIN: 8.3 G/DL (ref 11.5–15.5)
HYPOCHROMIA: ABNORMAL
IMMATURE RETIC FRACT: 33.7 % (ref 3–15.9)
IRON SATURATION: 4 % (ref 15–50)
IRON: 12 MCG/DL (ref 37–145)
LYMPHOCYTES ABSOLUTE: 0.68 E9/L (ref 1.5–4)
LYMPHOCYTES RELATIVE PERCENT: 8 % (ref 20–42)
MAGNESIUM: 2.1 MG/DL (ref 1.6–2.6)
MCH RBC QN AUTO: 21 PG (ref 26–35)
MCHC RBC AUTO-ENTMCNC: 28.6 % (ref 32–34.5)
MCV RBC AUTO: 73.4 FL (ref 80–99.9)
MONOCYTES ABSOLUTE: 0.51 E9/L (ref 0.1–0.95)
MONOCYTES RELATIVE PERCENT: 6 % (ref 2–12)
NEUTROPHILS ABSOLUTE: 7.31 E9/L (ref 1.8–7.3)
NEUTROPHILS RELATIVE PERCENT: 86 % (ref 43–80)
NUCLEATED RED BLOOD CELLS: 1 /100 WBC
OVALOCYTES: ABNORMAL
PDW BLD-RTO: 19 FL (ref 11.5–15)
PHOSPHORUS: 2.4 MG/DL (ref 2.5–4.5)
PLATELET # BLD: 356 E9/L (ref 130–450)
PMV BLD AUTO: 9.8 FL (ref 7–12)
POIKILOCYTES: ABNORMAL
POTASSIUM SERPL-SCNC: 3.3 MMOL/L (ref 3.5–5)
PRO-BNP: 2412 PG/ML (ref 0–450)
RBC # BLD: 3.95 E12/L (ref 3.5–5.5)
RETIC HGB EQUIVALENT: 17.1 PG (ref 28.2–36.6)
RETICULOCYTE ABSOLUTE COUNT: 0.06 E12/L
RETICULOCYTE COUNT PCT: 1.5 % (ref 0.4–1.9)
SODIUM BLD-SCNC: 139 MMOL/L (ref 132–146)
T4 FREE: 0.93 NG/DL (ref 0.93–1.7)
TOTAL IRON BINDING CAPACITY: 302 MCG/DL (ref 250–450)
TROPONIN: 0.01 NG/ML (ref 0–0.03)
TROPONIN: <0.01 NG/ML (ref 0–0.03)
TROPONIN: <0.01 NG/ML (ref 0–0.03)
TSH SERPL DL<=0.05 MIU/L-ACNC: 1.59 UIU/ML (ref 0.27–4.2)
WBC # BLD: 8.5 E9/L (ref 4.5–11.5)

## 2020-09-29 PROCEDURE — 83735 ASSAY OF MAGNESIUM: CPT

## 2020-09-29 PROCEDURE — 82746 ASSAY OF FOLIC ACID SERUM: CPT

## 2020-09-29 PROCEDURE — 83540 ASSAY OF IRON: CPT

## 2020-09-29 PROCEDURE — 84439 ASSAY OF FREE THYROXINE: CPT

## 2020-09-29 PROCEDURE — 85045 AUTOMATED RETICULOCYTE COUNT: CPT

## 2020-09-29 PROCEDURE — 82728 ASSAY OF FERRITIN: CPT

## 2020-09-29 PROCEDURE — 36415 COLL VENOUS BLD VENIPUNCTURE: CPT

## 2020-09-29 PROCEDURE — 6360000002 HC RX W HCPCS: Performed by: NURSE PRACTITIONER

## 2020-09-29 PROCEDURE — 2580000003 HC RX 258: Performed by: NURSE PRACTITIONER

## 2020-09-29 PROCEDURE — 96374 THER/PROPH/DIAG INJ IV PUSH: CPT

## 2020-09-29 PROCEDURE — G0378 HOSPITAL OBSERVATION PER HR: HCPCS

## 2020-09-29 PROCEDURE — 96376 TX/PRO/DX INJ SAME DRUG ADON: CPT

## 2020-09-29 PROCEDURE — 6370000000 HC RX 637 (ALT 250 FOR IP): Performed by: NURSE PRACTITIONER

## 2020-09-29 PROCEDURE — 84484 ASSAY OF TROPONIN QUANT: CPT

## 2020-09-29 PROCEDURE — 93005 ELECTROCARDIOGRAM TRACING: CPT | Performed by: EMERGENCY MEDICINE

## 2020-09-29 PROCEDURE — 83880 ASSAY OF NATRIURETIC PEPTIDE: CPT

## 2020-09-29 PROCEDURE — 80048 BASIC METABOLIC PNL TOTAL CA: CPT

## 2020-09-29 PROCEDURE — 84443 ASSAY THYROID STIM HORMONE: CPT

## 2020-09-29 PROCEDURE — 85025 COMPLETE CBC W/AUTO DIFF WBC: CPT

## 2020-09-29 PROCEDURE — 99283 EMERGENCY DEPT VISIT LOW MDM: CPT

## 2020-09-29 PROCEDURE — 84100 ASSAY OF PHOSPHORUS: CPT

## 2020-09-29 PROCEDURE — 93010 ELECTROCARDIOGRAM REPORT: CPT | Performed by: INTERNAL MEDICINE

## 2020-09-29 PROCEDURE — 82607 VITAMIN B-12: CPT

## 2020-09-29 PROCEDURE — 71045 X-RAY EXAM CHEST 1 VIEW: CPT

## 2020-09-29 PROCEDURE — 83550 IRON BINDING TEST: CPT

## 2020-09-29 RX ORDER — ONDANSETRON 2 MG/ML
4 INJECTION INTRAMUSCULAR; INTRAVENOUS EVERY 6 HOURS PRN
Status: DISCONTINUED | OUTPATIENT
Start: 2020-09-29 | End: 2020-10-05 | Stop reason: HOSPADM

## 2020-09-29 RX ORDER — PROMETHAZINE HYDROCHLORIDE 25 MG/1
12.5 TABLET ORAL EVERY 6 HOURS PRN
Status: DISCONTINUED | OUTPATIENT
Start: 2020-09-29 | End: 2020-10-05 | Stop reason: HOSPADM

## 2020-09-29 RX ORDER — SODIUM CHLORIDE 0.9 % (FLUSH) 0.9 %
10 SYRINGE (ML) INJECTION EVERY 12 HOURS SCHEDULED
Status: DISCONTINUED | OUTPATIENT
Start: 2020-09-29 | End: 2020-10-05 | Stop reason: HOSPADM

## 2020-09-29 RX ORDER — SODIUM CHLORIDE 0.9 % (FLUSH) 0.9 %
10 SYRINGE (ML) INJECTION PRN
Status: DISCONTINUED | OUTPATIENT
Start: 2020-09-29 | End: 2020-10-05 | Stop reason: HOSPADM

## 2020-09-29 RX ORDER — PANTOPRAZOLE SODIUM 40 MG/1
40 TABLET, DELAYED RELEASE ORAL DAILY
COMMUNITY

## 2020-09-29 RX ORDER — ACETAMINOPHEN 650 MG/1
650 SUPPOSITORY RECTAL EVERY 6 HOURS PRN
Status: DISCONTINUED | OUTPATIENT
Start: 2020-09-29 | End: 2020-10-05 | Stop reason: HOSPADM

## 2020-09-29 RX ORDER — POLYETHYLENE GLYCOL 3350 17 G/17G
17 POWDER, FOR SOLUTION ORAL DAILY PRN
Status: DISCONTINUED | OUTPATIENT
Start: 2020-09-29 | End: 2020-10-05 | Stop reason: HOSPADM

## 2020-09-29 RX ORDER — POTASSIUM CHLORIDE 7.45 MG/ML
10 INJECTION INTRAVENOUS PRN
Status: DISCONTINUED | OUTPATIENT
Start: 2020-09-29 | End: 2020-10-02

## 2020-09-29 RX ORDER — MAGNESIUM SULFATE 1 G/100ML
1 INJECTION INTRAVENOUS PRN
Status: DISCONTINUED | OUTPATIENT
Start: 2020-09-29 | End: 2020-10-05 | Stop reason: HOSPADM

## 2020-09-29 RX ORDER — POTASSIUM CHLORIDE 20 MEQ/1
40 TABLET, EXTENDED RELEASE ORAL PRN
Status: DISCONTINUED | OUTPATIENT
Start: 2020-09-29 | End: 2020-10-02

## 2020-09-29 RX ORDER — ASPIRIN 81 MG/1
81 TABLET, CHEWABLE ORAL DAILY
Status: DISCONTINUED | OUTPATIENT
Start: 2020-09-29 | End: 2020-10-05 | Stop reason: HOSPADM

## 2020-09-29 RX ORDER — MIRTAZAPINE 15 MG/1
30 TABLET, ORALLY DISINTEGRATING ORAL NIGHTLY
Status: DISCONTINUED | OUTPATIENT
Start: 2020-09-29 | End: 2020-10-05 | Stop reason: HOSPADM

## 2020-09-29 RX ORDER — FUROSEMIDE 10 MG/ML
40 INJECTION INTRAMUSCULAR; INTRAVENOUS DAILY
Status: DISCONTINUED | OUTPATIENT
Start: 2020-09-29 | End: 2020-10-01

## 2020-09-29 RX ORDER — LEVOTHYROXINE SODIUM 0.07 MG/1
75 TABLET ORAL DAILY
COMMUNITY

## 2020-09-29 RX ORDER — FUROSEMIDE 10 MG/ML
40 INJECTION INTRAMUSCULAR; INTRAVENOUS ONCE
Status: COMPLETED | OUTPATIENT
Start: 2020-09-29 | End: 2020-09-29

## 2020-09-29 RX ORDER — ATORVASTATIN CALCIUM 20 MG/1
20 TABLET, FILM COATED ORAL DAILY
Status: DISCONTINUED | OUTPATIENT
Start: 2020-09-29 | End: 2020-10-05 | Stop reason: HOSPADM

## 2020-09-29 RX ORDER — LEVOTHYROXINE SODIUM 0.07 MG/1
75 TABLET ORAL DAILY
Status: DISCONTINUED | OUTPATIENT
Start: 2020-09-29 | End: 2020-10-05 | Stop reason: HOSPADM

## 2020-09-29 RX ORDER — ACETAMINOPHEN 325 MG/1
650 TABLET ORAL EVERY 6 HOURS PRN
Status: DISCONTINUED | OUTPATIENT
Start: 2020-09-29 | End: 2020-10-05 | Stop reason: HOSPADM

## 2020-09-29 RX ORDER — CODEINE/BUTALBITAL/ASA/CAFFEIN 30-50-325
1 CAPSULE ORAL
COMMUNITY

## 2020-09-29 RX ORDER — MIRTAZAPINE 30 MG/1
30 TABLET, FILM COATED ORAL NIGHTLY
COMMUNITY

## 2020-09-29 RX ORDER — PANTOPRAZOLE SODIUM 40 MG/1
40 TABLET, DELAYED RELEASE ORAL
Status: DISCONTINUED | OUTPATIENT
Start: 2020-09-30 | End: 2020-10-05 | Stop reason: HOSPADM

## 2020-09-29 RX ORDER — ROPINIROLE 1 MG/1
1 TABLET, FILM COATED ORAL NIGHTLY
Status: DISCONTINUED | OUTPATIENT
Start: 2020-09-29 | End: 2020-10-05 | Stop reason: HOSPADM

## 2020-09-29 RX ORDER — POTASSIUM CHLORIDE 20 MEQ/1
40 TABLET, EXTENDED RELEASE ORAL ONCE
Status: COMPLETED | OUTPATIENT
Start: 2020-09-29 | End: 2020-09-29

## 2020-09-29 RX ADMIN — MIRTAZAPINE 30 MG: 15 TABLET, ORALLY DISINTEGRATING ORAL at 20:11

## 2020-09-29 RX ADMIN — ENOXAPARIN SODIUM 30 MG: 40 INJECTION SUBCUTANEOUS at 16:50

## 2020-09-29 RX ADMIN — ATORVASTATIN CALCIUM 20 MG: 20 TABLET, FILM COATED ORAL at 16:50

## 2020-09-29 RX ADMIN — FUROSEMIDE 40 MG: 10 INJECTION, SOLUTION INTRAMUSCULAR; INTRAVENOUS at 14:59

## 2020-09-29 RX ADMIN — ROPINIROLE 1 MG: 1 TABLET, FILM COATED ORAL at 20:11

## 2020-09-29 RX ADMIN — Medication 10 ML: at 20:11

## 2020-09-29 RX ADMIN — ASPIRIN 81 MG CHEWABLE TABLET 81 MG: 81 TABLET CHEWABLE at 16:50

## 2020-09-29 RX ADMIN — FUROSEMIDE 40 MG: 10 INJECTION, SOLUTION INTRAMUSCULAR; INTRAVENOUS at 16:50

## 2020-09-29 RX ADMIN — LEVOTHYROXINE SODIUM 75 MCG: 75 TABLET ORAL at 16:50

## 2020-09-29 RX ADMIN — POTASSIUM CHLORIDE 40 MEQ: 1500 TABLET, EXTENDED RELEASE ORAL at 14:59

## 2020-09-29 RX ADMIN — NITROGLYCERIN 0.5 INCH: 20 OINTMENT TOPICAL at 19:06

## 2020-09-29 ASSESSMENT — ENCOUNTER SYMPTOMS
ABDOMINAL PAIN: 0
VOMITING: 0
NAUSEA: 0
COUGH: 0
BACK PAIN: 0
SINUS PAIN: 0
RHINORRHEA: 0
SORE THROAT: 0
WHEEZING: 0
CONSTIPATION: 0
SHORTNESS OF BREATH: 1
DIARRHEA: 0
PHOTOPHOBIA: 0
SINUS PRESSURE: 0

## 2020-09-29 ASSESSMENT — PAIN SCALES - GENERAL
PAINLEVEL_OUTOF10: 3
PAINLEVEL_OUTOF10: 5
PAINLEVEL_OUTOF10: 5

## 2020-09-29 ASSESSMENT — PAIN DESCRIPTION - PAIN TYPE
TYPE: CHRONIC PAIN
TYPE: CHRONIC PAIN

## 2020-09-29 ASSESSMENT — PAIN DESCRIPTION - LOCATION
LOCATION: HEAD
LOCATION: HEAD

## 2020-09-29 NOTE — PROGRESS NOTES
Timothy Medel CNP,    Your patient is on a medication that requires a renal dose adjustment. Renal Function Assessment:    Date Body Weight IBW Adj. Body Weight SCr CrCl Dialysis status   9/29/2020 49.9 kg 50.1 kg 49.9 kg 1.7 22 ml/min No order       Pharmacy has renally dose-adjusted the following medication(s):    Date Medication Original Dosing Regimen New Dosing Regimen   9/29/2020 Enoxaparin  40 mg daily 30 mg daily           These changes were made per protocol according to the [facility] Renal Function-Based Dose Adjustments Policy    *Please note this dose may need readjusted if your patient's renal function significantly improves. Please contact pharmacy with any questions regarding these changes.

## 2020-09-29 NOTE — H&P
Department of Internal Medicine  History and Physical Examination     Primary Care Physician: Carlita De La Vega MD   Admitting Physician:  Melissa Billingsley DO  Admission date and time: 9/29/2020 12:16 PM    Room:  16/16  Admitting diagnosis: Acute on chronic diastolic (congestive) heart failure [facility]) [I50.33]    Patient Name: Bhavana Caballero  MRN: [id number]    Date of Service: 9/29/2020     Chief Complaint:  REYNAGA    HISTORY OF PRESENT ILLNESS:    Umer Encarnacion is a 67year old female patient who presented to the emergency department today with dyspnea on exertion. Symptoms have been ongoing for several days, actually weeks on further questioning with the patient. She admits that she has 11 stairs in her home that she has to walk with some regularity and this is become increasingly difficult to the point where she can no longer do this due to the degree of dyspnea. She does not believe she has gained any significant weight and admits to only mild lower extremity swelling. Dyspnea exertion is quite prohibitive however. She does have some mild chest discomfort at the maximal exertion which does improve dramatically with rest.  She denies orthopnea. She has not changed her fluid or sodium intake significantly recently. She does admit to being somewhat depressed due to the recent loss of her sister. She is coughing with congestion but not expectorating sputum or hemoptysis. She denies fevers or chills, constitutional symptoms of illness. No known sick contacts or exposures. She is anemic at baseline but denies any overt gastrointestinal or urinary/vaginal bleeding. No abdominal pain, vomiting, bowel pattern change. No dysuria or urinary frequency.     PAST MEDICAL Hx:  Past Medical History:   Diagnosis Date    Acute on chronic diastolic (congestive) heart failure (HCC) 9/29/2020    Arthritis     GERD (gastroesophageal reflux disease)     History of blood transfusion     Hyperlipidemia     Migraines     Thyroid disease        PAST SURGICAL Hx:   Past Surgical History:   Procedure Laterality Date    COLONOSCOPY      ENDOSCOPY, COLON, DIAGNOSTIC      FRACTURE SURGERY      left wrist, 2010-fx left hip    HYSTERECTOMY      JOINT REPLACEMENT      bilat knees    OTHER SURGICAL HISTORY Left 2-7-13    removal of hardware left hip left hip arthroplasty       FAMILY Hx:  History reviewed. No pertinent family history. HOME MEDICATIONS:  Prior to Admission medications    Medication Sig Start Date End Date Taking? Authorizing Provider   pantoprazole (PROTONIX) 20 MG tablet Take 1 tablet by mouth daily 11/13/19   Shefali Tapia DO   mirtazapine (REMERON SOL-TAB) 15 MG disintegrating tablet Take 15 mg by mouth nightly    Historical Provider, MD   rOPINIRole (REQUIP) 1 MG tablet Take 1 mg by mouth nightly Take 1 to 2 tablets every night at bed time    Historical Provider, MD   Butalbital-ASA-Caff-Codeine (FIORINAL/CODEINE #3 PO) Take 1 tablet by mouth every 4 hours as needed (for headaches) Take one capsule every 4 to 6 hours as needed    Historical Provider, MD   simvastatin (ZOCOR) 20 MG tablet Take 40 mg by mouth nightly     Historical Provider, MD   levothyroxine (SYNTHROID) 50 MCG tablet Take 88 mcg by mouth Daily     Historical Provider, MD       ALLERGIES:  Penicillins    SOCIAL Hx:  Social History     Socioeconomic History    Marital status:       Spouse name: Not on file    Number of children: Not on file    Years of education: Not on file    Highest education level: Not on file   Occupational History    Not on file   Social Needs    Financial resource strain: Not on file    Food insecurity     Worry: Not on file     Inability: Not on file    Transportation needs     Medical: Not on file     Non-medical: Not on file   Tobacco Use    Smoking status: Never Smoker    Smokeless tobacco: Never Used   Substance and Sexual Activity    Alcohol use: No    Drug use: No    Sexual activity: Not on file   Lifestyle

## 2020-09-29 NOTE — ED PROVIDER NOTES
Patient is a 67-year-old female who presents the chief complaint shortness of breath. Patient states that for the past month she has had worsening shortness of breath. She states that with minimal movement or exertion she does become short of breath especially when going up the stairs. States that she has 11 stairs at her house he has she has to go up and when she is at the top she has to sit down and rest because she is lightheaded. She also states that there is times where she has to crawl to bed to relax after exerting herself. The patient denies any associated headache, diaphoresis, chest pain, abdominal pain, nausea, vomiting. Patient states that she was supposed to schedule an echocardiogram but has not done it yet. She does not follow the cardiologist.  Patient denies any fevers, chills. Patient denies any underlying lung conditions. The history is provided by the patient. No  was used. Review of Systems   Constitutional: Negative for chills, fatigue and fever. HENT: Negative for congestion, rhinorrhea, sinus pressure, sinus pain, sneezing and sore throat. Eyes: Negative for photophobia and visual disturbance. Respiratory: Positive for shortness of breath. Negative for cough and wheezing. Cardiovascular: Negative for chest pain and palpitations. Gastrointestinal: Negative for abdominal pain, constipation, diarrhea, nausea and vomiting. Genitourinary: Negative for dysuria, frequency and hematuria. Musculoskeletal: Negative for back pain, neck pain and neck stiffness. Skin: Negative for rash and wound. Neurological: Positive for light-headedness. Negative for dizziness and headaches. Psychiatric/Behavioral: Negative for agitation, behavioral problems and confusion. Physical Exam  Constitutional:       General: She is not in acute distress. Appearance: She is not toxic-appearing. HENT:      Head: Normocephalic.       Right Ear: Tympanic membrane normal.      Left Ear: Tympanic membrane normal.      Mouth/Throat:      Mouth: Mucous membranes are moist.   Eyes:      Pupils: Pupils are equal, round, and reactive to light. Neck:      Musculoskeletal: Normal range of motion. No neck rigidity. Cardiovascular:      Rate and Rhythm: Normal rate. Heart sounds: No murmur. No gallop. Pulmonary:      Effort: No respiratory distress. Breath sounds: Normal breath sounds. No wheezing. Comments: Clear breath sounds in all lung fields. No acute respiratory distress. Pulse ox 96% on room air. Abdominal:      General: There is no distension. Palpations: Abdomen is soft. Tenderness: There is no abdominal tenderness. Musculoskeletal: Normal range of motion. General: No swelling or deformity. Comments: No lower extremity edema present. Lymphadenopathy:      Cervical: No cervical adenopathy. Skin:     General: Skin is dry. Capillary Refill: Capillary refill takes less than 2 seconds. Coloration: Skin is not jaundiced. Findings: No bruising. Neurological:      Mental Status: She is alert and oriented to person, place, and time. Cranial Nerves: No cranial nerve deficit. Motor: No weakness. Comments: Patient is awake, alert, and oriented x3. No focal neurological deficits. Psychiatric:         Mood and Affect: Mood normal.         Thought Content: Thought content normal.          Procedures     MDM  Number of Diagnoses or Management Options  Acute on chronic diastolic CHF (congestive heart failure) [facility]):   Exertional dyspnea:   Diagnosis management comments: Patient is a 65-year-old female who presents the chief complaint of worsening shortness of breath for the past month. Patient states that she can no longer go up the stairs in her house without getting significantly short of breath and have to rest.  On examination she does not really have a loud systolic murmur.   There was evidence of congestive heart failure. Concern was that the patient became very tachypneic and short of breath with ambulation. The she will be admitted for further cardiac work-up. Patient is agreeable. ED Course as of Sep 29 2024   Tue Sep 29, 2020   1441 Patient was ambulated and became very tachypneic and lightheaded. They were not able to obtain an accurate pulse ox. When she started out she was 96% on room air sitting but then became more short of breath as she was ambulating. Decision was made to admit the patient she is agreeable. [MS]      ED Course User Index  [MS] Srikanth Bautista, DO      --------------------------------------------- PAST HISTORY ---------------------------------------------  Past Medical History:  has a past medical history of Acute on chronic diastolic (congestive) heart failure (HCC), Arthritis, GERD (gastroesophageal reflux disease), History of blood transfusion, Hyperlipidemia, Migraines, and Thyroid disease. Past Surgical History:  has a past surgical history that includes Hysterectomy; Endoscopy, colon, diagnostic; Colonoscopy; fracture surgery; other surgical history (Left, 2-7-13); and joint replacement. Social History:  reports that she has never smoked. She has never used smokeless tobacco. She reports that she does not drink alcohol or use drugs. Family History: family history is not on file. The patients home medications have been reviewed.     Allergies: Penicillins    -------------------------------------------------- RESULTS -------------------------------------------------    LABS:  Results for orders placed or performed during the hospital encounter of 09/29/20   CBC Auto Differential   Result Value Ref Range    WBC 8.5 4.5 - 11.5 E9/L    RBC 3.95 3.50 - 5.50 E12/L    Hemoglobin 8.3 (L) 11.5 - 15.5 g/dL    Hematocrit 29.0 (L) 34.0 - 48.0 %    MCV 73.4 (L) 80.0 - 99.9 fL    MCH 21.0 (L) 26.0 - 35.0 pg    MCHC 28.6 (L) 32.0 - 34.5 %    RDW 19.0 (H) 11.5 - 15.0 fL    Platelets 892 013 - 623 E9/L    MPV 9.8 7.0 - 12.0 fL    Neutrophils % 86.0 (H) 43.0 - 80.0 %    Lymphocytes % 8.0 (L) 20.0 - 42.0 %    Monocytes % 6.0 2.0 - 12.0 %    Eosinophils % 0.0 0.0 - 6.0 %    Basophils % 0.0 0.0 - 2.0 %    Neutrophils Absolute 7.31 (H) 1.80 - 7.30 E9/L    Lymphocytes Absolute 0.68 (L) 1.50 - 4.00 E9/L    Monocytes Absolute 0.51 0.10 - 0.95 E9/L    Eosinophils Absolute 0.00 (L) 0.05 - 0.50 E9/L    Basophils Absolute 0.00 0.00 - 0.20 E9/L    nRBC 1.0 /100 WBC    Anisocytosis 2+     Hypochromia 1+     Poikilocytes 1+     Ovalocytes 1+    Basic metabolic panel   Result Value Ref Range    Sodium 139 132 - 146 mmol/L    Potassium 3.3 (L) 3.5 - 5.0 mmol/L    Chloride 102 98 - 107 mmol/L    CO2 20 (L) 22 - 29 mmol/L    Anion Gap 17 (H) 7 - 16 mmol/L    Glucose 128 (H) 74 - 99 mg/dL    BUN 16 8 - 23 mg/dL    CREATININE 1.7 (H) 0.5 - 1.0 mg/dL    GFR Non-African American 29 >=60 mL/min/1.73    GFR African American 35     Calcium 9.2 8.6 - 10.2 mg/dL   Brain Natriuretic Peptide   Result Value Ref Range    Pro-BNP 2,412 (H) 0 - 450 pg/mL   Magnesium   Result Value Ref Range    Magnesium 2.1 1.6 - 2.6 mg/dL   Phosphorus   Result Value Ref Range    Phosphorus 2.4 (L) 2.5 - 4.5 mg/dL   Troponin   Result Value Ref Range    Troponin <0.01 0.00 - 0.03 ng/mL   Reticulocytes   Result Value Ref Range    Retic Ct Pct 1.5 0.4 - 1.9 %    Retic Ct Abs 0.057 E12/L    Immature Retic Fract 33.7 (H) 3.0 - 15.9 %    Hematocrit 27.9 (L) 34.0 - 48.0 %    Retic HGB Equivalent 17.1 (L) 28.2 - 36.6 pg   Troponin   Result Value Ref Range    Troponin <0.01 0.00 - 0.03 ng/mL   TSH without Reflex   Result Value Ref Range    TSH 1.590 0.270 - 4.200 uIU/mL   T4, free   Result Value Ref Range    T4 Free 0.93 0.93 - 1.70 ng/dL   EKG 12 Lead   Result Value Ref Range    Ventricular Rate 87 BPM    Atrial Rate 87 BPM    P-R Interval 162 ms    QRS Duration 74 ms    Q-T Interval 366 ms    QTc Calculation (Bazett) 440 ms    P Axis 62 degrees    R Axis -39 degrees    T Axis 89 degrees       RADIOLOGY:  Xr Chest Portable    Result Date: 9/29/2020  EXAMINATION: ONE XRAY VIEW OF THE CHEST 9/29/2020 1:16 pm COMPARISON: Chest radiograph November 9, 2019 HISTORY: ORDERING SYSTEM PROVIDED HISTORY: shortness of breath TECHNOLOGIST PROVIDED HISTORY: Reason for exam:->shortness of breath FINDINGS: The lungs are without acute focal process. There is no effusion or pneumothorax. The cardiomediastinal silhouette is without acute process. The osseous structures are without acute process. Large hiatal hernia again identified. No acute process. EKG: This EKG is signed and interpreted by me. Rate: 87  Rhythm: Sinus  Interpretation: Sinus rhythm, left axis deviation. No acute ischemic changes seen. ------------------------- NURSING NOTES AND VITALS REVIEWED ---------------------------  The nursing notes within the ED encounter and vital signs as below have been reviewed. Patient Vitals for the past 24 hrs:   BP Temp Temp src Pulse Resp SpO2 Height Weight   09/29/20 1640 -- -- -- 83 -- -- -- --   09/29/20 1600 -- -- -- -- -- -- 5' 2\" (1.575 m) 110 lb (49.9 kg)   09/29/20 1545 (!) 145/64 98 °F (36.7 °C) Oral 83 18 -- -- --   09/29/20 1400 (!) 142/68 97.8 °F (36.6 °C) -- 78 18 96 % -- --   09/29/20 1219 132/68 98.7 °F (37.1 °C) -- 93 26 96 % -- 110 lb (49.9 kg)   09/29/20 1215 (!) 143/67 -- -- 105 26 -- -- 110 lb (49.9 kg)       Oxygen Saturation Interpretation: Normal    ------------------------------------------ PROGRESS NOTES ------------------------------------------  Re-evaluation(s):  Please see ED course. Counseling:  I have spoken with the patient and discussed todays results, in addition to providing specific details for the plan of care and counseling regarding the diagnosis and prognosis.   Their questions are answered at this time and they are agreeable with the plan of admission.    --------------------------------- ADDITIONAL PROVIDER NOTES ---------------------------------  Consultations:  Spoke with Shanna Mayfield NP for Dr. Don Hawkins. Discussed case. They will admit the patient. This patient's ED course included: a personal history and physicial examination, re-evaluation prior to disposition, cardiac monitoring and continuous pulse oximetry    This patient has remained hemodynamically stable during their ED course. Diagnosis:  1. Acute on chronic diastolic CHF (congestive heart failure) ([address]    2. Exertional dyspnea        Disposition:  Patient's disposition: Admit to telemetry  Patient's condition is stable.             Zak Lawrence DO  09/29/20 2026

## 2020-09-30 LAB
ANION GAP SERPL CALCULATED.3IONS-SCNC: 17 MMOL/L (ref 7–16)
ANISOCYTOSIS: ABNORMAL
BASOPHILS ABSOLUTE: 0 E9/L (ref 0–0.2)
BASOPHILS RELATIVE PERCENT: 0 % (ref 0–2)
BUN BLDV-MCNC: 18 MG/DL (ref 8–23)
CALCIUM SERPL-MCNC: 9.2 MG/DL (ref 8.6–10.2)
CHLORIDE BLD-SCNC: 103 MMOL/L (ref 98–107)
CHOLESTEROL, TOTAL: 202 MG/DL (ref 0–199)
CO2: 22 MMOL/L (ref 22–29)
CREAT SERPL-MCNC: 1.9 MG/DL (ref 0.5–1)
EOSINOPHILS ABSOLUTE: 0.1 E9/L (ref 0.05–0.5)
EOSINOPHILS RELATIVE PERCENT: 1 % (ref 0–6)
FOLATE: 7.7 NG/ML (ref 4.8–24.2)
GFR AFRICAN AMERICAN: 31
GFR NON-AFRICAN AMERICAN: 26 ML/MIN/1.73
GLUCOSE BLD-MCNC: 121 MG/DL (ref 74–99)
HBA1C MFR BLD: 6.1 % (ref 4–5.6)
HCT VFR BLD CALC: 29.1 % (ref 34–48)
HDLC SERPL-MCNC: 83 MG/DL
HEMOGLOBIN: 8 G/DL (ref 11.5–15.5)
LDL CHOLESTEROL CALCULATED: 94 MG/DL (ref 0–99)
LV EF: 81 %
LVEF MODALITY: NORMAL
LYMPHOCYTES ABSOLUTE: 2.45 E9/L (ref 1.5–4)
LYMPHOCYTES RELATIVE PERCENT: 24 % (ref 20–42)
MAGNESIUM: 2.1 MG/DL (ref 1.6–2.6)
MCH RBC QN AUTO: 20 PG (ref 26–35)
MCHC RBC AUTO-ENTMCNC: 27.5 % (ref 32–34.5)
MCV RBC AUTO: 72.6 FL (ref 80–99.9)
MONOCYTES ABSOLUTE: 0.41 E9/L (ref 0.1–0.95)
MONOCYTES RELATIVE PERCENT: 4 % (ref 2–12)
NEUTROPHILS ABSOLUTE: 7.24 E9/L (ref 1.8–7.3)
NEUTROPHILS RELATIVE PERCENT: 71 % (ref 43–80)
NUCLEATED RED BLOOD CELLS: 1 /100 WBC
OVALOCYTES: ABNORMAL
PDW BLD-RTO: 19.3 FL (ref 11.5–15)
PHOSPHORUS: 3.2 MG/DL (ref 2.5–4.5)
PLATELET # BLD: 368 E9/L (ref 130–450)
PMV BLD AUTO: 10.5 FL (ref 7–12)
POIKILOCYTES: ABNORMAL
POTASSIUM SERPL-SCNC: 3.3 MMOL/L (ref 3.5–5)
RBC # BLD: 4.01 E12/L (ref 3.5–5.5)
SODIUM BLD-SCNC: 142 MMOL/L (ref 132–146)
TRIGL SERPL-MCNC: 126 MG/DL (ref 0–149)
TROPONIN: <0.01 NG/ML (ref 0–0.03)
VITAMIN B-12: 762 PG/ML (ref 211–946)
VLDLC SERPL CALC-MCNC: 25 MG/DL
WBC # BLD: 10.2 E9/L (ref 4.5–11.5)

## 2020-09-30 PROCEDURE — 6370000000 HC RX 637 (ALT 250 FOR IP): Performed by: INTERNAL MEDICINE

## 2020-09-30 PROCEDURE — 6370000000 HC RX 637 (ALT 250 FOR IP): Performed by: NURSE PRACTITIONER

## 2020-09-30 PROCEDURE — 80048 BASIC METABOLIC PNL TOTAL CA: CPT

## 2020-09-30 PROCEDURE — G0378 HOSPITAL OBSERVATION PER HR: HCPCS

## 2020-09-30 PROCEDURE — 85025 COMPLETE CBC W/AUTO DIFF WBC: CPT

## 2020-09-30 PROCEDURE — 2580000003 HC RX 258: Performed by: NURSE PRACTITIONER

## 2020-09-30 PROCEDURE — 83735 ASSAY OF MAGNESIUM: CPT

## 2020-09-30 PROCEDURE — 83036 HEMOGLOBIN GLYCOSYLATED A1C: CPT

## 2020-09-30 PROCEDURE — 96372 THER/PROPH/DIAG INJ SC/IM: CPT

## 2020-09-30 PROCEDURE — 84484 ASSAY OF TROPONIN QUANT: CPT

## 2020-09-30 PROCEDURE — 96376 TX/PRO/DX INJ SAME DRUG ADON: CPT

## 2020-09-30 PROCEDURE — 97116 GAIT TRAINING THERAPY: CPT | Performed by: PHYSICAL THERAPIST

## 2020-09-30 PROCEDURE — 97161 PT EVAL LOW COMPLEX 20 MIN: CPT | Performed by: PHYSICAL THERAPIST

## 2020-09-30 PROCEDURE — 6360000002 HC RX W HCPCS: Performed by: NURSE PRACTITIONER

## 2020-09-30 PROCEDURE — 80061 LIPID PANEL: CPT

## 2020-09-30 PROCEDURE — 36415 COLL VENOUS BLD VENIPUNCTURE: CPT

## 2020-09-30 PROCEDURE — 84100 ASSAY OF PHOSPHORUS: CPT

## 2020-09-30 PROCEDURE — 93306 TTE W/DOPPLER COMPLETE: CPT

## 2020-09-30 RX ORDER — FERROUS SULFATE 325(65) MG
325 TABLET ORAL 2 TIMES DAILY WITH MEALS
Status: DISCONTINUED | OUTPATIENT
Start: 2020-09-30 | End: 2020-10-05 | Stop reason: HOSPADM

## 2020-09-30 RX ORDER — BUTALBITAL, ACETAMINOPHEN AND CAFFEINE 50; 325; 40 MG/1; MG/1; MG/1
1 TABLET ORAL EVERY 4 HOURS PRN
Status: DISCONTINUED | OUTPATIENT
Start: 2020-09-30 | End: 2020-10-02

## 2020-09-30 RX ORDER — POTASSIUM CHLORIDE 20 MEQ/1
40 TABLET, EXTENDED RELEASE ORAL DAILY
Status: DISCONTINUED | OUTPATIENT
Start: 2020-09-30 | End: 2020-10-03

## 2020-09-30 RX ADMIN — ASPIRIN 81 MG CHEWABLE TABLET 81 MG: 81 TABLET CHEWABLE at 11:44

## 2020-09-30 RX ADMIN — FUROSEMIDE 40 MG: 10 INJECTION, SOLUTION INTRAMUSCULAR; INTRAVENOUS at 10:16

## 2020-09-30 RX ADMIN — FERROUS SULFATE TAB 325 MG (65 MG ELEMENTAL FE) 325 MG: 325 (65 FE) TAB at 11:52

## 2020-09-30 RX ADMIN — LEVOTHYROXINE SODIUM 75 MCG: 75 TABLET ORAL at 05:50

## 2020-09-30 RX ADMIN — Medication 10 ML: at 20:36

## 2020-09-30 RX ADMIN — ONDANSETRON 4 MG: 2 INJECTION INTRAMUSCULAR; INTRAVENOUS at 05:50

## 2020-09-30 RX ADMIN — PANTOPRAZOLE SODIUM 40 MG: 40 TABLET, DELAYED RELEASE ORAL at 05:50

## 2020-09-30 RX ADMIN — POTASSIUM CHLORIDE 40 MEQ: 1500 TABLET, EXTENDED RELEASE ORAL at 11:45

## 2020-09-30 RX ADMIN — FERROUS SULFATE TAB 325 MG (65 MG ELEMENTAL FE) 325 MG: 325 (65 FE) TAB at 16:49

## 2020-09-30 RX ADMIN — ENOXAPARIN SODIUM 30 MG: 40 INJECTION SUBCUTANEOUS at 16:50

## 2020-09-30 RX ADMIN — Medication 10 ML: at 09:00

## 2020-09-30 RX ADMIN — MIRTAZAPINE 30 MG: 15 TABLET, ORALLY DISINTEGRATING ORAL at 20:32

## 2020-09-30 RX ADMIN — NITROGLYCERIN 0.5 INCH: 20 OINTMENT TOPICAL at 01:25

## 2020-09-30 RX ADMIN — ONDANSETRON 4 MG: 2 INJECTION INTRAMUSCULAR; INTRAVENOUS at 11:44

## 2020-09-30 RX ADMIN — BUTALBITAL, ACETAMINOPHEN AND CAFFEINE 1 TABLET: 50; 325; 40 TABLET ORAL at 11:44

## 2020-09-30 RX ADMIN — ATORVASTATIN CALCIUM 20 MG: 20 TABLET, FILM COATED ORAL at 11:46

## 2020-09-30 ASSESSMENT — PAIN SCALES - GENERAL
PAINLEVEL_OUTOF10: 7
PAINLEVEL_OUTOF10: 4

## 2020-09-30 ASSESSMENT — PAIN DESCRIPTION - PAIN TYPE
TYPE: CHRONIC PAIN
TYPE: CHRONIC PAIN

## 2020-09-30 ASSESSMENT — PAIN DESCRIPTION - LOCATION
LOCATION: HEAD
LOCATION: HEAD

## 2020-09-30 ASSESSMENT — PAIN - FUNCTIONAL ASSESSMENT: PAIN_FUNCTIONAL_ASSESSMENT: PREVENTS OR INTERFERES SOME ACTIVE ACTIVITIES AND ADLS

## 2020-09-30 ASSESSMENT — PAIN DESCRIPTION - ORIENTATION
ORIENTATION: OTHER (COMMENT)
ORIENTATION: RIGHT;LEFT

## 2020-09-30 ASSESSMENT — PAIN DESCRIPTION - PROGRESSION: CLINICAL_PROGRESSION: NOT CHANGED

## 2020-09-30 ASSESSMENT — PAIN DESCRIPTION - DESCRIPTORS
DESCRIPTORS: ACHING;CONSTANT;HEADACHE
DESCRIPTORS: HEADACHE

## 2020-09-30 ASSESSMENT — PAIN DESCRIPTION - FREQUENCY: FREQUENCY: CONTINUOUS

## 2020-09-30 ASSESSMENT — PAIN DESCRIPTION - ONSET: ONSET: ON-GOING

## 2020-09-30 NOTE — CARE COORDINATION
9-30- Cm note: reached out to pt again re: home health care , pt does not want to consider Home care at this time, says she will think about it. SS/Cm will continue to follow throughout pt's stay.  Electronically signed by Jasmin Bateman RN on 9/30/2020 at 3:01 PM

## 2020-09-30 NOTE — CARE COORDINATION
9-30-Cm note: met with pt for transition opf care needs, pt is very nauseous this am, pt's son Yadiel Cleaning [phone] lives with her , pt is somewhat irritable and declines talking about home health care at this time . Cm/SS will follow her.  Electronically signed by Star Kirk RN on 9/30/2020 at 9:50 AM

## 2020-09-30 NOTE — PROGRESS NOTES
mg Oral QAM AC    rOPINIRole  1 mg Oral Nightly    atorvastatin  20 mg Oral Daily    sodium chloride flush  10 mL Intravenous 2 times per day    aspirin  81 mg Oral Daily    furosemide  40 mg Intravenous Daily    enoxaparin  30 mg Subcutaneous Daily     Continuous Infusions:    Objective Data:  CBC with Differential:    Lab Results   Component Value Date    WBC 10.2 09/30/2020    RBC 4.01 09/30/2020    HGB 8.0 09/30/2020    HCT 29.1 09/30/2020     09/30/2020    MCV 72.6 09/30/2020    MCH 20.0 09/30/2020    MCHC 27.5 09/30/2020    RDW 19.3 09/30/2020    NRBC 1.0 09/30/2020    SEGSPCT 62 02/09/2013    LYMPHOPCT 24.0 09/30/2020    MONOPCT 4.0 09/30/2020    BASOPCT 0.0 09/30/2020    MONOSABS 0.41 09/30/2020    LYMPHSABS 2.45 09/30/2020    EOSABS 0.10 09/30/2020    BASOSABS 0.00 09/30/2020     BMP:    Lab Results   Component Value Date     09/30/2020    K 3.3 09/30/2020     09/30/2020    CO2 22 09/30/2020    BUN 18 09/30/2020    LABALBU 3.2 11/10/2019    LABALBU 3.5 06/09/2011    CREATININE 1.9 09/30/2020    CALCIUM 9.2 09/30/2020    GFRAA 31 09/30/2020    LABGLOM 26 09/30/2020    GLUCOSE 121 09/30/2020    GLUCOSE 167 06/10/2011       Assessment:  1. Acutely decompensated diastolic congestive heart failure  2. Microcytic anemia of chronic disease  3. Chronic kidney disease stage III  4. Hyperlipidemia   5. Hypothyroidism   6. Gastroesophageal reflux disease with hiatal hernia  7. Moderate protein calorie malnutrition    Plan: We will provide gentle IV diuresis and move forward with 2D echocardiogram today. Chronic comorbidities will be monitored pain particular attention to the patient's chronic kidney disease while undergoing diuresis. She will work with the therapy teams today. We discussed the possibility of temporary rehabilitation and she defers. She is agreeable to home health care and this will be arranged accordingly. Continue current therapy.   See orders for further plan of care.    More than 50% of my  time was spent at the bedside counseling/coordinating care with the patient and/or family with face to face contact. This time was spent reviewing notes and laboratory data as well as instructing and counseling the patient. Time I spent with the family or surrogate(s) is included only if the patient was incapable of providing the necessary information or participating in medical decisions. I also discussed the differential diagnosis and all of the proposed management plans with the patient and individuals accompanying the patient. [place] requires this high level of physician care and nursing on the IMC/Telemetry unit due the complexity of decision management and chance of rapid decline or death. Continued cardiac monitoring and higher level of nursing are required. I am readily available for any further decision-making and intervention.      Alisha Toney DO, F.A.C.O.I.  9/30/2020  8:41 AM

## 2020-09-30 NOTE — PROGRESS NOTES
Physical Therapy    Physical Therapy Initial Evaluation    Room #:  8145/9491-73  Patient Name: Froy Bright  YOB: 1945  MRN: [id number]    Referring Provider: NIC Richey CNP      Date of Service: 9/30/2020    Evaluating Physical Therapist:  Dot Kc, PT  Lic. # [id number]      Diagnosis:   Acute on chronic diastolic (congestive) heart failure (HCC) [I50.33]        Patient Active Problem List   Diagnosis    Arthritis, hip    Avascular necrosis of bone of hip ([address]    HLD (hyperlipidemia)    Migraine    PUD (peptic ulcer disease)    Hypothyroid    Transient cerebral ischemia    Acute renal failure (ARF) ([address]    [place] (acute kidney injury) ([address]    Acute on chronic diastolic (congestive) heart failure ([address]        Tentative placement recommendation: Home Health Physical Therapy     Equipment recommendation: None      Prior Level of Function: Patient ambulated independently    Rehab Potential: good  for baseline    Past medical history:   Past Medical History:   Diagnosis Date    Acute on chronic diastolic (congestive) heart failure ([address] 9/29/2020    Arthritis     GERD (gastroesophageal reflux disease)     History of blood transfusion     Hyperlipidemia     Migraines     Thyroid disease      Past Surgical History:   Procedure Laterality Date    COLONOSCOPY      ENDOSCOPY, COLON, DIAGNOSTIC      FRACTURE SURGERY      left wrist, 2010-fx left hip    HYSTERECTOMY      JOINT REPLACEMENT      bilat knees    OTHER SURGICAL HISTORY Left 2-7-13    removal of hardware left hip left hip arthroplasty       Precautions:  Up with assistance, falls and alarm ,      SUBJECTIVE:    Social history: Patient lives with son   in a two story home resides first  with 5 steps  to enter with [employer] owned: Wheelchair, Cane, Wheeled Walker and Bedside commode,       [address] [facility]   How much difficulty turning over in bed?: None  How much education  Patient educated on role of Physical Therapy, risks of immobility, safety and plan of care and energy conservation      Patient response to education:   Pt verbalized understanding Pt demonstrated skill Pt requires further education in this area   Yes Partial Yes      ASSESSMENT: Patient exhibits decreased strength, balance, coordination impairing functional mobility. Decreased balance, strength and endurance and complication of nausea impact safety and patient is a risk for fall. Patient would benefit from continued skilled Physical Therapy to improve functional independence and quality of life. Therapist educated and facilitated patient on techniques to increase safety and independence with bed mobility, balance, functional transfers, and functional mobility. Treatment:  Patient practiced and was instructed in the following treatment: Patient performed transfers from bed and gait with cane with instruction for safety and proper use; pt requires frequent rest periods for safety and ability Sat edge of bed 10 minutes with Supervision  to increase dynamic sitting balance and activity tolerance. Therapeutic Exercises:  not performed      At end of session, patient in bed with in care of nursing call light and phone within reach,   all lines and tubes intact, nursing notified. Patient's/ family goals   home dtr present and agrees      Patient and or family understand(s) diagnosis, prognosis, and plan of care.     Plan of Care:     -Standing Balance: Perform strengthening exercises in standing to promote motor control with or without upper extremity support   -Gait: Standing activities to improve: base of support, weight shift, weight bearing    -Endurance: Utilize Supervised activities to increase level of endurance to allow for safe functional mobility including transfers and gait   -Stairs: Stair training with instruction on proper technique and hand placement on rail    Physical Therapy care will be provided in accordance with the objectives noted above. Exercises and functional mobility practice will be used as well as appropriate assistive devices or modalities to obtain goals. Patient and family education will also be administered as needed. Frequency of treatments: Patient will be seen    daily  for therapeutic exercise, functional retraining, endurance activities, balance exercises, family and patient education. Time in  947  Time out  959    Total Treatment Time  8 minutes    Evaluation time includes thorough review of current medical information, gathering information on past medical history/social history and prior level of function, completion of standardized testing/informal observation of tasks, assessment of data, and development of Plan of care and goals.      CPT codes:  Low Complexity PT evaluation (84848)  Gait Training (70320) 8 minutes 1 unit(s)    Michelle Brown, PT

## 2020-10-01 PROBLEM — I50.43 CHF (CONGESTIVE HEART FAILURE), NYHA CLASS I, ACUTE ON CHRONIC, COMBINED (HCC): Status: ACTIVE | Noted: 2020-10-01

## 2020-10-01 LAB
ANION GAP SERPL CALCULATED.3IONS-SCNC: 16 MMOL/L (ref 7–16)
ANION GAP SERPL CALCULATED.3IONS-SCNC: 16 MMOL/L (ref 7–16)
ANISOCYTOSIS: ABNORMAL
BASOPHILS ABSOLUTE: 0 E9/L (ref 0–0.2)
BASOPHILS RELATIVE PERCENT: 0 % (ref 0–2)
BUN BLDV-MCNC: 18 MG/DL (ref 8–23)
BUN BLDV-MCNC: 20 MG/DL (ref 8–23)
CALCIUM SERPL-MCNC: 8.8 MG/DL (ref 8.6–10.2)
CALCIUM SERPL-MCNC: 9 MG/DL (ref 8.6–10.2)
CHLORIDE BLD-SCNC: 102 MMOL/L (ref 98–107)
CHLORIDE BLD-SCNC: 102 MMOL/L (ref 98–107)
CO2: 22 MMOL/L (ref 22–29)
CO2: 22 MMOL/L (ref 22–29)
CREAT SERPL-MCNC: 2.1 MG/DL (ref 0.5–1)
CREAT SERPL-MCNC: 2.1 MG/DL (ref 0.5–1)
EOSINOPHILS ABSOLUTE: 0 E9/L (ref 0.05–0.5)
EOSINOPHILS RELATIVE PERCENT: 0 % (ref 0–6)
GFR AFRICAN AMERICAN: 28
GFR AFRICAN AMERICAN: 28
GFR NON-AFRICAN AMERICAN: 23 ML/MIN/1.73
GFR NON-AFRICAN AMERICAN: 23 ML/MIN/1.73
GLUCOSE BLD-MCNC: 157 MG/DL (ref 74–99)
GLUCOSE BLD-MCNC: 99 MG/DL (ref 74–99)
HCT VFR BLD CALC: 27.7 % (ref 34–48)
HEMOGLOBIN: 7.7 G/DL (ref 11.5–15.5)
HYPOCHROMIA: ABNORMAL
LYMPHOCYTES ABSOLUTE: 2.05 E9/L (ref 1.5–4)
LYMPHOCYTES RELATIVE PERCENT: 27 % (ref 20–42)
MAGNESIUM: 2 MG/DL (ref 1.6–2.6)
MCH RBC QN AUTO: 20.1 PG (ref 26–35)
MCHC RBC AUTO-ENTMCNC: 27.8 % (ref 32–34.5)
MCV RBC AUTO: 72.1 FL (ref 80–99.9)
MONOCYTES ABSOLUTE: 0.99 E9/L (ref 0.1–0.95)
MONOCYTES RELATIVE PERCENT: 13 % (ref 2–12)
NEUTROPHILS ABSOLUTE: 4.56 E9/L (ref 1.8–7.3)
NEUTROPHILS RELATIVE PERCENT: 60 % (ref 43–80)
NUCLEATED RED BLOOD CELLS: 1 /100 WBC
OVALOCYTES: ABNORMAL
PDW BLD-RTO: 19 FL (ref 11.5–15)
PHOSPHORUS: 3 MG/DL (ref 2.5–4.5)
PLATELET # BLD: 343 E9/L (ref 130–450)
PMV BLD AUTO: 10.2 FL (ref 7–12)
POIKILOCYTES: ABNORMAL
POLYCHROMASIA: ABNORMAL
POTASSIUM SERPL-SCNC: 3.9 MMOL/L (ref 3.5–5)
POTASSIUM SERPL-SCNC: 4.1 MMOL/L (ref 3.5–5)
RBC # BLD: 3.84 E12/L (ref 3.5–5.5)
SODIUM BLD-SCNC: 140 MMOL/L (ref 132–146)
SODIUM BLD-SCNC: 140 MMOL/L (ref 132–146)
WBC # BLD: 7.6 E9/L (ref 4.5–11.5)

## 2020-10-01 PROCEDURE — 36415 COLL VENOUS BLD VENIPUNCTURE: CPT

## 2020-10-01 PROCEDURE — 6370000000 HC RX 637 (ALT 250 FOR IP): Performed by: NURSE PRACTITIONER

## 2020-10-01 PROCEDURE — 80048 BASIC METABOLIC PNL TOTAL CA: CPT

## 2020-10-01 PROCEDURE — 1200000000 HC SEMI PRIVATE

## 2020-10-01 PROCEDURE — 6370000000 HC RX 637 (ALT 250 FOR IP): Performed by: INTERNAL MEDICINE

## 2020-10-01 PROCEDURE — 85025 COMPLETE CBC W/AUTO DIFF WBC: CPT

## 2020-10-01 PROCEDURE — 84100 ASSAY OF PHOSPHORUS: CPT

## 2020-10-01 PROCEDURE — 96376 TX/PRO/DX INJ SAME DRUG ADON: CPT

## 2020-10-01 PROCEDURE — 97116 GAIT TRAINING THERAPY: CPT

## 2020-10-01 PROCEDURE — 2580000003 HC RX 258: Performed by: NURSE PRACTITIONER

## 2020-10-01 PROCEDURE — 97535 SELF CARE MNGMENT TRAINING: CPT

## 2020-10-01 PROCEDURE — 83735 ASSAY OF MAGNESIUM: CPT

## 2020-10-01 PROCEDURE — 97165 OT EVAL LOW COMPLEX 30 MIN: CPT

## 2020-10-01 PROCEDURE — 6360000002 HC RX W HCPCS: Performed by: NURSE PRACTITIONER

## 2020-10-01 RX ORDER — 0.9 % SODIUM CHLORIDE 0.9 %
1000 INTRAVENOUS SOLUTION INTRAVENOUS ONCE
Status: COMPLETED | OUTPATIENT
Start: 2020-10-01 | End: 2020-10-01

## 2020-10-01 RX ADMIN — PANTOPRAZOLE SODIUM 40 MG: 40 TABLET, DELAYED RELEASE ORAL at 05:54

## 2020-10-01 RX ADMIN — ASPIRIN 81 MG CHEWABLE TABLET 81 MG: 81 TABLET CHEWABLE at 08:05

## 2020-10-01 RX ADMIN — ATORVASTATIN CALCIUM 20 MG: 20 TABLET, FILM COATED ORAL at 08:05

## 2020-10-01 RX ADMIN — MIRTAZAPINE 30 MG: 15 TABLET, ORALLY DISINTEGRATING ORAL at 20:25

## 2020-10-01 RX ADMIN — BUTALBITAL, ACETAMINOPHEN AND CAFFEINE 1 TABLET: 50; 325; 40 TABLET ORAL at 13:19

## 2020-10-01 RX ADMIN — Medication 10 ML: at 08:05

## 2020-10-01 RX ADMIN — Medication 10 ML: at 20:27

## 2020-10-01 RX ADMIN — SODIUM CHLORIDE 1000 ML: 9 INJECTION, SOLUTION INTRAVENOUS at 08:07

## 2020-10-01 RX ADMIN — POTASSIUM CHLORIDE 40 MEQ: 1500 TABLET, EXTENDED RELEASE ORAL at 08:05

## 2020-10-01 RX ADMIN — FERROUS SULFATE TAB 325 MG (65 MG ELEMENTAL FE) 325 MG: 325 (65 FE) TAB at 08:05

## 2020-10-01 RX ADMIN — LEVOTHYROXINE SODIUM 75 MCG: 75 TABLET ORAL at 05:54

## 2020-10-01 RX ADMIN — FERROUS SULFATE TAB 325 MG (65 MG ELEMENTAL FE) 325 MG: 325 (65 FE) TAB at 17:07

## 2020-10-01 RX ADMIN — ENOXAPARIN SODIUM 30 MG: 40 INJECTION SUBCUTANEOUS at 17:07

## 2020-10-01 RX ADMIN — ROPINIROLE 1 MG: 1 TABLET, FILM COATED ORAL at 20:25

## 2020-10-01 RX ADMIN — FUROSEMIDE 40 MG: 10 INJECTION, SOLUTION INTRAMUSCULAR; INTRAVENOUS at 08:05

## 2020-10-01 ASSESSMENT — PAIN SCALES - GENERAL
PAINLEVEL_OUTOF10: 0
PAINLEVEL_OUTOF10: 6
PAINLEVEL_OUTOF10: 8

## 2020-10-01 ASSESSMENT — PAIN DESCRIPTION - ONSET: ONSET: ON-GOING

## 2020-10-01 ASSESSMENT — PAIN DESCRIPTION - PAIN TYPE: TYPE: CHRONIC PAIN

## 2020-10-01 ASSESSMENT — PAIN DESCRIPTION - LOCATION: LOCATION: HEAD

## 2020-10-01 ASSESSMENT — PAIN DESCRIPTION - FREQUENCY: FREQUENCY: CONTINUOUS

## 2020-10-01 ASSESSMENT — PAIN DESCRIPTION - DESCRIPTORS: DESCRIPTORS: ACHING;DISCOMFORT;HEADACHE

## 2020-10-01 NOTE — PLAN OF CARE
Problem: Pain:  Goal: Pain level will decrease  Description: Pain level will decrease  10/1/2020 1106 by Ko Cazares RN  Outcome: Met This Shift     Problem: Pain:  Goal: Control of acute pain  Description: Control of acute pain  10/1/2020 1106 by Ko Cazares RN  Outcome: Met This Shift     Problem: Pain:  Goal: Control of chronic pain  Description: Control of chronic pain  10/1/2020 1106 by Ko Cazares RN  Outcome: Met This Shift     Problem: Falls - Risk of:  Goal: Will remain free from falls  Description: Will remain free from falls  10/1/2020 1106 by Ko Cazares RN  Outcome: Met This Shift     Problem: Falls - Risk of:  Goal: Absence of physical injury  Description: Absence of physical injury  10/1/2020 1106 by Ko Cazares RN  Outcome: Met This Shift

## 2020-10-01 NOTE — PROGRESS NOTES
Exam:  I/O this shift:  In: 10 [I.V.:10]  Out: -     Intake/Output Summary (Last 24 hours) at 10/1/2020 0848  Last data filed at 10/1/2020 0805  Gross per 24 hour   Intake 370 ml   Output 300 ml   Net 70 ml   I/O last 3 completed shifts: In: 480 [P.O.:480]  Out: 300 [Urine:300]  Patient Vitals for the past 96 hrs (Last 3 readings):   Weight   10/01/20 0453 108 lb 12.8 oz (49.4 kg)   09/30/20 0620 108 lb 12.8 oz (49.4 kg)   09/29/20 1600 110 lb (49.9 kg)     Vital Signs:   Blood pressure 101/61, pulse 82, temperature 98.2 °F (36.8 °C), temperature source Oral, resp. rate 16, height 5' 2\" (1.575 m), weight 108 lb 12.8 oz (49.4 kg), SpO2 93 %. General appearance:  Alert, responsive, oriented to person, place, and time. Well preserved, alert, no distress. Head:  Normocephalic. No masses, lesions or tenderness. Eyes:  PERRLA. EOMI. Sclera clear. Buccal mucosa moist.  ENT:  Ears normal. Mucosa normal.  Neck:    Supple. Trachea midline. No thyromegaly. No JVD. No bruits. Heart:    Rhythm regular. Rate controlled. Mild systolic murmur. Lungs:    Symmetrical. Clear to auscultation bilaterally. No wheezes. No rhonchi. No rales. Abdomen:   Soft. Non-tender. Non-distended. Bowel sounds positive. No organomegaly or masses. No pain on palpation. Extremities:    Peripheral pulses present. No peripheral edema. No ulcers. No cyanosis. No clubbing. Neurologic:    Alert x 3. No focal deficit. Cranial nerves grossly intact. No focal weakness. Psych:   Behavior is normal. Mood appears normal. Speech is not rapid and/or pressured. Musculoskeletal:   Spine ROM normal. Muscular strength intact. Gait not assessed. Integumentary:  No rashes  Skin normal color and texture.   Genitalia/Breast:  Deferred    Medication:  Scheduled Meds:   sodium chloride  1,000 mL Intravenous Once    ferrous sulfate  325 mg Oral BID WC    potassium chloride  40 mEq Oral Daily    levothyroxine  75 mcg Oral Daily    mirtazapine  30 mg Oral Nightly    pantoprazole  40 mg Oral QAM AC    rOPINIRole  1 mg Oral Nightly    atorvastatin  20 mg Oral Daily    sodium chloride flush  10 mL Intravenous 2 times per day    aspirin  81 mg Oral Daily    enoxaparin  30 mg Subcutaneous Daily     Continuous Infusions:    Objective Data:  CBC with Differential:    Lab Results   Component Value Date    WBC 7.6 10/01/2020    RBC 3.84 10/01/2020    HGB 7.7 10/01/2020    HCT 27.7 10/01/2020     10/01/2020    MCV 72.1 10/01/2020    MCH 20.1 10/01/2020    MCHC 27.8 10/01/2020    RDW 19.0 10/01/2020    NRBC 1.0 10/01/2020    SEGSPCT 62 02/09/2013    LYMPHOPCT 27.0 10/01/2020    MONOPCT 13.0 10/01/2020    BASOPCT 0.0 10/01/2020    MONOSABS 0.99 10/01/2020    LYMPHSABS 2.05 10/01/2020    EOSABS 0.00 10/01/2020    BASOSABS 0.00 10/01/2020     BMP:    Lab Results   Component Value Date     10/01/2020    K 3.9 10/01/2020     10/01/2020    CO2 22 10/01/2020    BUN 20 10/01/2020    LABALBU 3.2 11/10/2019    LABALBU 3.5 06/09/2011    CREATININE 2.1 10/01/2020    CALCIUM 9.0 10/01/2020    GFRAA 28 10/01/2020    LABGLOM 23 10/01/2020    GLUCOSE 99 10/01/2020    GLUCOSE 167 06/10/2011       Assessment:  1. Acutely decompensated diastolic congestive heart failure  2. Acute on chronic kidney disease stage III  3. Microcytic anemia of chronic disease  4. Hyperlipidemia   5. Hypothyroidism   6. Gastroesophageal reflux disease with hiatal hernia  7. Moderate protein calorie malnutrition    Plan:   Patient's creatinine has bumped mildly in the setting of gentle IV diuresis. Diuretics will be discontinued and fluid will be given back. I will assess the preliminary echocardiogram report today. Chronic comorbidities are otherwise well controlled. She is feeling better overall and we discussed the need to become more ambulatory today. She is deferring home health care upon discharge.   I suspect she will be acceptable for discharge home tomorrow pending renal improvement and cardiac report. More than 50% of my  time was spent at the bedside counseling/coordinating care with the patient and/or family with face to face contact. This time was spent reviewing notes and laboratory data as well as instructing and counseling the patient. Time I spent with the family or surrogate(s) is included only if the patient was incapable of providing the necessary information or participating in medical decisions. I also discussed the differential diagnosis and all of the proposed management plans with the patient and individuals accompanying the patient. [facility] requires this high level of physician care and nursing on the IMC/Telemetry unit due the complexity of decision management and chance of rapid decline or death. Continued cardiac monitoring and higher level of nursing are required. I am readily available for any further decision-making and intervention.      Eriberto Ruvalcaba DO, F.A.C.O.I.  10/1/2020  8:48 AM

## 2020-10-01 NOTE — PROGRESS NOTES
OCCUPATIONAL THERAPY  Initial Evaluation  Date:10/1/2020  Patient Name: Alessia Richmond  MRN: [id number]  : 1945  ROOM #: [id number]     Referring Provider: NIC Gresham CNP   Evaluating OT: Juanita Castillo OTR/L [id number]    Placement Recommendation: Arlette Tavares if needed    Recommended Adaptive Equipment: none     [facility]   AM-PAC Daily Activity Inpatient   How much help for putting on and taking off regular lower body clothing?: A Little  How much help for Bathing?: A Little  How much help for Toileting?: A Little  How much help for putting on and taking off regular upper body clothing?: A Little  How much help for taking care of personal grooming?: A Little  How much help for eating meals?: None  AM-PAC Inpatient Daily Activity Raw Score: 19  AM-PAC Inpatient ADL T-Scale Score : 40.22  ADL Inpatient CMS 0-100% Score: 42.8  ADL Inpatient CMS G-Code Modifier : CK    Based on patient's functional performance as stated below and their level of assistance needed prior to admission, this therapist believes that the patient would benefit from skilled Occupational Therapy following their hospital stay in an effort to increase safety and independence with completion of ADL/IADL tasks for functional independence and quality of life. Diagnosis:   1. Acute on chronic diastolic CHF (congestive heart failure) ([facility] [address]    2. Exertional dyspnea      Pertinent Medical History:   Past Medical History:   Diagnosis Date    Acute on chronic diastolic (congestive) heart failure (HCC) 2020    Arthritis     GERD (gastroesophageal reflux disease)     History of blood transfusion     Hyperlipidemia     Migraines     Thyroid disease       Precautions:  falls, nausea, migraines   Pain Scale: Numeric Rate: migraine 5/10; Nursing notified.     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 good  Problem solving skills   good  Memory    good  Sequencing   good  Judgement/safety  Communication: impaired   Visual perceptual skills: impaired     Glasses: yes   Edema: no     Sensation: intact   Hand Dominance:  Left     X  Right     Left Right Comment   Passive range of motion [facility]     Active range of motion [facility]     Muscle Grade 4/5 4/5    /pinch Strength Intact  Intact       Functional Assessment:    Initial Evaluation Status Date:   10/1/2020 Treatment Status  Date: STGs = LTGs  Time frame: 5 - 14 days   Feeding Independent to bring food to mouth   Independent    Grooming Supervision at sink level   Independent    UB Dressing Supervision   Independent    LB Dressing Minimal Assist to adjust and pull socks up  Independent    Bathing Minimal Assist  Independent    Toileting Supervision   Independent    Bed Mobility  Facilitated Supine to sit: Supervision   Scooting:Supervision  Sit to supine: Supervision     Rolling: Supervision  Pt declined sitting up in chair so bed was placed in chair position. Pt instructed on how to return to bed setting. Supine to sit: Independent   Scooting:Independent  Sit to supine: Independent   Rolling: Independent   Functional Transfers Supervision from EOB. Supervision to and from commode with verbal cues to use grab bar for safe commode transfer. Transfer training with verbal cues for hand placement throughout session to improve safety.    Independent    Functional Mobility Minimal assist with no device to and from the bathroom  Independent    Activity Tolerance Fair   Good      Balance:   Sitting balance at EOB to increase dynamic sitting balance and activities tolerance for 5 minutes with Supervision     Standing with no device   Endurance: fair     Comments: Upon arrival to the room the patient was supine. At end of the session, the patient was returned to bed and placed in the chair position. Call light and phone within reach. Pt required verbal cues and instruction as noted above for safe facilitation and completion of tasks. Therapist provided skilled monitoring of the patient's response during treatment session. Prior to and at the end of session, environmental modifications/line management completed for patients safety and efficiency of treatment session. OT services provided to include instruction/training on safety and adapted techniques for completion of transfers and ADL's. Overall, the patient demonstrates difficulties with completion of BADL's and IADL's. Factors contributing to these difficulties includes decreased endurance and generalized weakness. Pt would benefit from continued skilled OT to increase independence with BADL's and IADL's. Treatment:    Bed mobility: Facilitated bed mobility with cues for proper body mechanics and sequencing to prepare for ADL completion. Functional transfers: Facilitated transfers from various surfaces with cues for body alignment, safety and hand placement. ADL completion: Self-care retraining for the above-mentioned ADLs; training on proper hand placement, safety technique, sequencing, and energy conservation techniques. Postural Balance: Sitting and standing balance retraining to improve righting reactions with postural changes during ADLs. Evaluation Complexity:   · Low Complexity  · History: Brief history including review of medical records relating to the problem  · Exam: 1-3 performance Deficits  · Assistance/Modification: No assistance or modifications required to perform tasks.  No comorbities affecting occupational performance. Assessment of current deficits:   Functional mobility [x]        ADLs [x]        Strength [x]      Cognition []  Functional transfers  [x]       IADLs [x]        Safety Awareness []      Endurance [x]  Fine Motor Coordination []       Balance [x]       Vision/perception []     Sensation []   Gross Motor Coordination []       ROM []         Delirium []                          Motor Control []    Plan of Care: OT 1-3x/week for 5-7 days PRN   ADL retraining/AE recommendations   Functional Transfer Training   Therapeutic Activity   Functional Mobility Training   Energy Conservation Techniques/Strategies   Positioning to Improve Functional [place], Safety, and Skin Integrity   Patient and/or Family Education to Increase Safety and Functional [place]         Rehab Potential: Good for established goals developed with patient and family. Patient / Family Goal: return home      Patient and/or family were instructed on functional diagnosis, prognosis/goals and OT plan of care. Demonstrated fair understanding. Evaluation time includes thorough review of current medical information, gathering information on past medical history/social history and prior level of function, completion of standardized testing/informal observation of tasks, assessment of data, and development of POC/Goals.     Time In: 11:48am    Time Out: 12:10pm                  Min Units   OT Eval Low 75632     OT Eval Medium 63759     OT Eval High 45206     OT Re-Eval [id number]          ADL/Self Care [id number]    Therapeutic Activities [id number]    Therapeutic Ex 98024     Orthotic Management 92844     Neuro Re-Ed 17310     Non-Billable Time     TOTAL TIMED TREATMENT 15 1     Maicol Hall OTR/L [id number]

## 2020-10-01 NOTE — CONSULTS
I met with patient at bedside regarding new consult for HF RN. [place] [facility] [place]. Appointment scheduled for October 19th at 1pm per pt request.  Clinic contact information provided to pt as well. I provided her with the following Heart Failure educational material,  which included:      *The Heart Failure book- \"Caring for Your Heart: Living Well with Heart Failure\"      *The Heart Failure Zones       *The Sodium Content pamphlet      *\"What Foods Should You Avoid? \" information sheet. *Fast Food Sodium information sheet         We reviewed the introduction to Heart Failure, the HF zones, signs and symptoms to report on day 1 of onset, medications, medication compliance, the importance of obtaining daily weights, following a low sodium diet, reading food labels for the sodium content, keeping physician appointments, and smoking cessation. We discussed writing / tracking her weights on a calendar / paper because 5# in 1 week can sneak up if you are not tracking it. She can also take her charted weights to her doctor appointments to be reviewed. Her contributing risk factors for Heart Failure are identified as . .. I advised patient she can reduce the risk for Heart Failure exacerbations by modifying / controlling the risk factors she has. We discussed self-managed care which includes the following:  to take her medications as prescribed, if she experiences any intolerable side effects to any medications to please call her cardiologist / doctor, do not just stop taking the medication; follow a cardiac heart healthy / low sodium diet; weigh herself daily, exercise regularly- per doctor recommendation and not to smoke or use an excess amount of alcohol. We discussed calling her cardiologist / doctor with a weight gain of 3# in one day or a total of 5# or more in one week.  Also, if she would have a significant weight loss of 3# or more in one day to call her doctor, she may have to decrease or hold her diuretic dose. On days she feels nauseated and not eating / drinking, having emesis or diarrhea,  informed to call her cardiologist  / doctor, she may have to decrease or hold her diuretic to avoid dehydration. I stressed the importance of informing her cardiologist the first day of onset of any of the signs and symptoms in the \"Yellow Zone\" of the HF Zones. She verbalizes understanding. Echocardiogram / EF: 11-11-19  Summary   Mild left ventricular concentric hypertrophy noted. ef 81% by 2D   Stage I diastolic dysfunction. Right ventricular systolic pressure of 88.30 mm Hg consistent with   moderate pulmonary hypertension. Mild mitral regurgitation is present. Mild tricuspid regurgitation. BNP:   Lab Results   Component Value Date    PROBNP 2,412 (H) 09/29/2020       History of:    has a past medical history of Acute on chronic diastolic (congestive) heart failure (HCC), Arthritis, GERD (gastroesophageal reflux disease), History of blood transfusion, Hyperlipidemia, Migraines, and Thyroid disease. has a past surgical history that includes Hysterectomy; Endoscopy, colon, diagnostic; Colonoscopy; fracture surgery; other surgical history (Left, 2-7-13); and joint replacement. Medications:    sodium chloride  1,000 mL Intravenous Once    ferrous sulfate  325 mg Oral BID WC    potassium chloride  40 mEq Oral Daily    levothyroxine  75 mcg Oral Daily    mirtazapine  30 mg Oral Nightly    pantoprazole  40 mg Oral QAM AC    rOPINIRole  1 mg Oral Nightly    atorvastatin  20 mg Oral Daily    sodium chloride flush  10 mL Intravenous 2 times per day    aspirin  81 mg Oral Daily    enoxaparin  30 mg Subcutaneous Daily         Code Status:Full Code    The patient is ordered:  Diet: DIET CARDIAC; Low Sodium (2 GM);  Daily Fluid Restriction: 2000 ml; No Caffeine   Sodium/fluid restriction daily ordered (fluid restriction recommended if patient is inhibitor ordered:  [] Yes  [x] No (specify contraindication):  [x] Renal Insufficiency  [] Cough  [] Hypotension  [] Allergy/angioedema  [] No left ventricular systolic dysfunction (LVSD)  [] Hyperkalemia  [] Moderate to severe aortic stenosis  [] Other (Specify):     Angiotensin II receptor blockers (ARB) ordered:  [] Yes  [x] No (specify contraindication):  [x] Renal Insufficiency  [] Hypotension  [] Allergy/angioedema  [] No LVSD  [] Hyperkalemia  [] Moderate to severe aortic stenosis  [] Other (Specify):      ARNI - Angiotensin Receptor Neprilysin Inhibitor ordered:  [] Yes  [x] No      ACC/AHA Guidelines Beta Blocker (Carvedilol, Metoprolol Succinate, or Bisoprolol) ordered:    [] Yes  [x] No (specify contraindication):  [] Bradycardia  [] Hypotension  [] LVD  [] 2nd or 3rd degree heart block  [] Bronchospastic airway disease  [] Decompensated CHF  [] Other (Specify)

## 2020-10-01 NOTE — PLAN OF CARE
Problem: Pain:  Goal: Pain level will decrease  Description: Pain level will decrease  9/30/2020 2228 by Faiza Garber RN  Outcome: Met This Shift  9/30/2020 1721 by Lisa Lopes RN  Outcome: Met This Shift  Goal: Control of acute pain  Description: Control of acute pain  9/30/2020 2228 by Faiza Garber RN  Outcome: Met This Shift  9/30/2020 1721 by Lisa Lopes RN  Outcome: Met This Shift  Goal: Control of chronic pain  Description: Control of chronic pain  9/30/2020 2228 by Faiza Garber RN  Outcome: Met This Shift  9/30/2020 1721 by Lisa Lopes RN  Outcome: Met This Shift     Problem: Falls - Risk of:  Goal: Will remain free from falls  Description: Will remain free from falls  9/30/2020 2228 by Faiza Garber RN  Outcome: Met This Shift  9/30/2020 1721 by Lisa Lopes RN  Outcome: Met This Shift  Goal: Absence of physical injury  Description: Absence of physical injury  9/30/2020 2228 by Faiza Garber RN  Outcome: Met This Shift  9/30/2020 1721 by Lisa Lopes RN  Outcome: Met This Shift

## 2020-10-01 NOTE — PROGRESS NOTES
Physical Therapy    Physical Therapy Treatment Note    Room #:  0415/0415-01  Patient Name: Milind Maloney  YOB: 1945  MRN: [id number]    Referring Provider: NIC Jacinto CNP      Date of Service: 10/1/2020    Evaluating Physical Therapist:  Yissel Toro, PT  Lic. # [id number]      Diagnosis:   Acute on chronic diastolic (congestive) heart failure (HCC) [I50.33]  CHF (congestive heart failure), NYHA class I, acute on chronic, combined ([address] [I50.43]        Patient Active Problem List   Diagnosis    Arthritis, hip    Avascular necrosis of bone of hip ([address]    HLD (hyperlipidemia)    Migraine    PUD (peptic ulcer disease)    Hypothyroid    Transient cerebral ischemia    Acute renal failure (ARF) ([address]    BROOKLYN (acute kidney injury) ([address]    Acute on chronic diastolic (congestive) heart failure ([address]    CHF (congestive heart failure), NYHA class I, acute on chronic, combined ([address]        Tentative placement recommendation: Home Health Physical Therapy     Equipment recommendation: None      Prior Level of Function: Patient ambulated independently    Rehab Potential: good  for baseline    Past medical history:   Past Medical History:   Diagnosis Date    Acute on chronic diastolic (congestive) heart failure ([address] 9/29/2020    Arthritis     GERD (gastroesophageal reflux disease)     History of blood transfusion     Hyperlipidemia     Migraines     Thyroid disease      Past Surgical History:   Procedure Laterality Date    COLONOSCOPY      ENDOSCOPY, COLON, DIAGNOSTIC      FRACTURE SURGERY      left wrist, 2010-fx left hip    HYSTERECTOMY      JOINT REPLACEMENT      bilat knees    OTHER SURGICAL HISTORY Left 2-7-13    removal of hardware left hip left hip arthroplasty       Precautions:  Up with assistance, falls and alarm ,      SUBJECTIVE:    Social history: Patient lives with son   in a two story home resides first  with 5 steps  to enter with [facility] owned: Wheelchair, Cane, Wheeled Walker and Bedside commode,       AM-PAC Basic Mobility        AM-[facility] Mobility Inpatient   How much difficulty turning over in bed?: None  How much difficulty sitting down on / standing up from a chair with arms?: A Little  How much difficulty moving from lying on back to sitting on side of bed?: None  How much help from another person moving to and from a bed to a chair?: A Little  How much help from another person needed to walk in hospital room?: A Little  How much help from another person for climbing 3-5 steps with a railing?: A Little  AM-[facility] Inpatient Mobility Raw Score : 20  AM-PAC Inpatient T-Scale Score : 47.67  Mobility Inpatient CMS 0-100% Score: 35.83  Mobility Inpatient CMS G-Code Modifier : [address] cleared patient for PT treatment. OBJECTIVE:   Initial Evaluation  Date: 9/30/20 Treatment Date:  10/1/2020       Short Term/ Long Term   Goals   Was pt agreeable to Eval/treatment? Yes   yes To be met in 2 days   Pain level   2/10  abdomen No number given  C/o migraine    Bed Mobility    Rolling: Independent    Supine to sit: Independent    Sit to supine: Independent    Scooting: Independent   Rolling: Independent   Supine to sit: Independent   Sit to supine: Independent   Scooting: Independent        Transfers Sit to stand: Supervision    Sit to stand: Supervision       Sit to stand: Independent     Ambulation    10 feet and 40  feet using  cane with Supervision     Cues for proper cane technique and unsteady 2 x 120 feet using  no device with Supervision  Cues for pacing and safety.     100 feet using  cane with Independent    Stair negotiation: ascended and descended   Not assessed       5 steps with rail supervision    ROM Within functional limits         Strength BUE: 3+/5  RLE:  3+/5  LLE:  3+/5   Increase strength in affected mm groups by 1/3 grade   Balance Sitting EOB:  good    Dynamic Standing:  fair minus; poor use of cane  Sitting EOB:  good   Dynamic Standing: good    Sitting EOB:  good    Dynamic Standing: fair        Patient is Alert & Oriented x person, place, time and situation  and follows directions      Edema:  no     Education:  Gait training  ASSESSMENT:     Pt able to progress with increased distance with no LOB using no assistive device. Pt needed a seated rest period at the end of the hallway due to impaired endurance. Treatment:  Patient practiced and was instructed in the following treatment:    Pt transferred to edge of bed. Sat edge of bed 2 minutes with Supervision  to increase dynamic sitting balance and activity tolerance. Pt stood, ambulated in hallway, needed a seated rest period, and ambulated back to the bed. Therapeutic Exercises: not performed      At end of session, patient in bed with  call light and phone within reach,  all lines and tubes intact, nursing notified. Patient would benefit from continued skilled Physical Therapy to improve functional independence and quality of life. PLAN:    Patient is making good progress towards PT goals and continues to need therapy for increased endurance. Time in  1:30  Time out  1:38         CPT codes:     minutes units   Gait training ([id number]) 8 1   Therapeutic activities (     Therapeutic exercise (44613)     Neuro-facilitation techniques (28285)     No treatment     TOTAL MINUTES 8 1     Bj Anderson  [facility]  LIC # [id number]

## 2020-10-01 NOTE — CARE COORDINATION
Ss note:10/1/2020.10:19 AM no covid testing. Pt resides with guerline Garcia [phone]. Per CM notes yesterday pt denies need for hhc at discharge. Dr. Candace Hogue anticipates discharge home tomorrow, sw can re visit prior to discharge to determine if pt will agree to [address].  Lujean Ganser, LSW

## 2020-10-01 NOTE — PROGRESS NOTES
Physical Therapy    [id number]    Patient unavailable for physical therapy treatment due to dry heaves. Viktoria Anderson  [facility]  LIC # [id number]

## 2020-10-02 ENCOUNTER — APPOINTMENT (OUTPATIENT)
Dept: NUCLEAR MEDICINE | Age: 75
DRG: 292 | End: 2020-10-02
Payer: MEDICARE

## 2020-10-02 LAB
ACANTHOCYTES: ABNORMAL
ANION GAP SERPL CALCULATED.3IONS-SCNC: 13 MMOL/L (ref 7–16)
ANISOCYTOSIS: ABNORMAL
BASOPHILIC STIPPLING: ABNORMAL
BASOPHILS ABSOLUTE: 0 E9/L (ref 0–0.2)
BASOPHILS RELATIVE PERCENT: 0 % (ref 0–2)
BUN BLDV-MCNC: 17 MG/DL (ref 8–23)
CALCIUM SERPL-MCNC: 8.7 MG/DL (ref 8.6–10.2)
CEA: 2.6 NG/ML (ref 0–5.2)
CHLORIDE BLD-SCNC: 101 MMOL/L (ref 98–107)
CO2: 23 MMOL/L (ref 22–29)
CREAT SERPL-MCNC: 1.9 MG/DL (ref 0.5–1)
EOSINOPHILS ABSOLUTE: 0 E9/L (ref 0.05–0.5)
EOSINOPHILS RELATIVE PERCENT: 0 % (ref 0–6)
GFR AFRICAN AMERICAN: 31
GFR NON-AFRICAN AMERICAN: 26 ML/MIN/1.73
GLUCOSE BLD-MCNC: 93 MG/DL (ref 74–99)
HCT VFR BLD CALC: 25.3 % (ref 34–48)
HEMOGLOBIN: 7.2 G/DL (ref 11.5–15.5)
HYPOCHROMIA: ABNORMAL
LYMPHOCYTES ABSOLUTE: 1.96 E9/L (ref 1.5–4)
LYMPHOCYTES RELATIVE PERCENT: 28 % (ref 20–42)
MAGNESIUM: 1.9 MG/DL (ref 1.6–2.6)
MCH RBC QN AUTO: 20.5 PG (ref 26–35)
MCHC RBC AUTO-ENTMCNC: 28.5 % (ref 32–34.5)
MCV RBC AUTO: 71.9 FL (ref 80–99.9)
MONOCYTES ABSOLUTE: 0.49 E9/L (ref 0.1–0.95)
MONOCYTES RELATIVE PERCENT: 7 % (ref 2–12)
NEUTROPHILS ABSOLUTE: 4.55 E9/L (ref 1.8–7.3)
NEUTROPHILS RELATIVE PERCENT: 65 % (ref 43–80)
OVALOCYTES: ABNORMAL
PDW BLD-RTO: 18.8 FL (ref 11.5–15)
PHOSPHORUS: 2.4 MG/DL (ref 2.5–4.5)
PLATELET # BLD: 363 E9/L (ref 130–450)
PMV BLD AUTO: 10.3 FL (ref 7–12)
POIKILOCYTES: ABNORMAL
POLYCHROMASIA: ABNORMAL
POTASSIUM SERPL-SCNC: 4.2 MMOL/L (ref 3.5–5)
RBC # BLD: 3.52 E12/L (ref 3.5–5.5)
SEDIMENTATION RATE, ERYTHROCYTE: 10 MM/HR (ref 0–20)
SODIUM BLD-SCNC: 137 MMOL/L (ref 132–146)
WBC # BLD: 7 E9/L (ref 4.5–11.5)

## 2020-10-02 PROCEDURE — 86334 IMMUNOFIX E-PHORESIS SERUM: CPT

## 2020-10-02 PROCEDURE — 85651 RBC SED RATE NONAUTOMATED: CPT

## 2020-10-02 PROCEDURE — 82232 ASSAY OF BETA-2 PROTEIN: CPT

## 2020-10-02 PROCEDURE — 80048 BASIC METABOLIC PNL TOTAL CA: CPT

## 2020-10-02 PROCEDURE — 6370000000 HC RX 637 (ALT 250 FOR IP): Performed by: INTERNAL MEDICINE

## 2020-10-02 PROCEDURE — 85025 COMPLETE CBC W/AUTO DIFF WBC: CPT

## 2020-10-02 PROCEDURE — 83735 ASSAY OF MAGNESIUM: CPT

## 2020-10-02 PROCEDURE — 78452 HT MUSCLE IMAGE SPECT MULT: CPT

## 2020-10-02 PROCEDURE — 2580000003 HC RX 258: Performed by: NURSE PRACTITIONER

## 2020-10-02 PROCEDURE — 6360000002 HC RX W HCPCS: Performed by: INTERNAL MEDICINE

## 2020-10-02 PROCEDURE — 97116 GAIT TRAINING THERAPY: CPT

## 2020-10-02 PROCEDURE — 82378 CARCINOEMBRYONIC ANTIGEN: CPT

## 2020-10-02 PROCEDURE — 2500000003 HC RX 250 WO HCPCS: Performed by: INTERNAL MEDICINE

## 2020-10-02 PROCEDURE — 6370000000 HC RX 637 (ALT 250 FOR IP): Performed by: NURSE PRACTITIONER

## 2020-10-02 PROCEDURE — 84100 ASSAY OF PHOSPHORUS: CPT

## 2020-10-02 PROCEDURE — 84207 ASSAY OF VITAMIN B-6: CPT

## 2020-10-02 PROCEDURE — 97530 THERAPEUTIC ACTIVITIES: CPT

## 2020-10-02 PROCEDURE — 36415 COLL VENOUS BLD VENIPUNCTURE: CPT

## 2020-10-02 PROCEDURE — 97535 SELF CARE MNGMENT TRAINING: CPT

## 2020-10-02 PROCEDURE — 6360000002 HC RX W HCPCS: Performed by: NURSE PRACTITIONER

## 2020-10-02 PROCEDURE — 2580000003 HC RX 258: Performed by: INTERNAL MEDICINE

## 2020-10-02 PROCEDURE — 82784 ASSAY IGA/IGD/IGG/IGM EACH: CPT

## 2020-10-02 PROCEDURE — 1200000000 HC SEMI PRIVATE

## 2020-10-02 PROCEDURE — 93005 ELECTROCARDIOGRAM TRACING: CPT | Performed by: INTERNAL MEDICINE

## 2020-10-02 PROCEDURE — A9500 TC99M SESTAMIBI: HCPCS | Performed by: RADIOLOGY

## 2020-10-02 PROCEDURE — 84165 PROTEIN E-PHORESIS SERUM: CPT

## 2020-10-02 PROCEDURE — 3430000000 HC RX DIAGNOSTIC RADIOPHARMACEUTICAL: Performed by: RADIOLOGY

## 2020-10-02 RX ORDER — METOPROLOL SUCCINATE 25 MG/1
25 TABLET, EXTENDED RELEASE ORAL DAILY
Status: DISCONTINUED | OUTPATIENT
Start: 2020-10-02 | End: 2020-10-05 | Stop reason: HOSPADM

## 2020-10-02 RX ORDER — LANOLIN ALCOHOL/MO/W.PET/CERES
50 CREAM (GRAM) TOPICAL DAILY
Status: DISCONTINUED | OUTPATIENT
Start: 2020-10-03 | End: 2020-10-05 | Stop reason: HOSPADM

## 2020-10-02 RX ORDER — METOPROLOL SUCCINATE 25 MG/1
25 TABLET, EXTENDED RELEASE ORAL DAILY
Status: DISCONTINUED | OUTPATIENT
Start: 2020-10-02 | End: 2020-10-02

## 2020-10-02 RX ORDER — METOPROLOL TARTRATE 5 MG/5ML
5 INJECTION INTRAVENOUS ONCE
Status: COMPLETED | OUTPATIENT
Start: 2020-10-02 | End: 2020-10-02

## 2020-10-02 RX ORDER — METOPROLOL TARTRATE 5 MG/5ML
5 INJECTION INTRAVENOUS
Status: COMPLETED | OUTPATIENT
Start: 2020-10-02 | End: 2020-10-02

## 2020-10-02 RX ADMIN — METOPROLOL TARTRATE 5 MG: 1 INJECTION, SOLUTION INTRAVENOUS at 23:26

## 2020-10-02 RX ADMIN — SODIUM CHLORIDE 125 MG: 9 INJECTION, SOLUTION INTRAVENOUS at 09:28

## 2020-10-02 RX ADMIN — POTASSIUM CHLORIDE 40 MEQ: 1500 TABLET, EXTENDED RELEASE ORAL at 09:29

## 2020-10-02 RX ADMIN — ASPIRIN 81 MG CHEWABLE TABLET 81 MG: 81 TABLET CHEWABLE at 09:29

## 2020-10-02 RX ADMIN — PANTOPRAZOLE SODIUM 40 MG: 40 TABLET, DELAYED RELEASE ORAL at 05:15

## 2020-10-02 RX ADMIN — METOPROLOL TARTRATE 5 MG: 1 INJECTION, SOLUTION INTRAVENOUS at 18:32

## 2020-10-02 RX ADMIN — BUTALBITAL, ACETAMINOPHEN AND CAFFEINE 1 TABLET: 50; 325; 40 TABLET ORAL at 05:15

## 2020-10-02 RX ADMIN — DARBEPOETIN ALFA 100 MCG: 100 INJECTION, SOLUTION INTRAVENOUS; SUBCUTANEOUS at 14:48

## 2020-10-02 RX ADMIN — ROPINIROLE 1 MG: 1 TABLET, FILM COATED ORAL at 20:31

## 2020-10-02 RX ADMIN — METOPROLOL SUCCINATE 25 MG: 25 TABLET, EXTENDED RELEASE ORAL at 09:30

## 2020-10-02 RX ADMIN — Medication 10 MILLICURIE: at 13:24

## 2020-10-02 RX ADMIN — ACETAMINOPHEN 650 MG: 325 TABLET, FILM COATED ORAL at 14:48

## 2020-10-02 RX ADMIN — Medication 10 ML: at 20:33

## 2020-10-02 RX ADMIN — LEVOTHYROXINE SODIUM 75 MCG: 75 TABLET ORAL at 05:15

## 2020-10-02 RX ADMIN — FERROUS SULFATE TAB 325 MG (65 MG ELEMENTAL FE) 325 MG: 325 (65 FE) TAB at 16:48

## 2020-10-02 RX ADMIN — ENOXAPARIN SODIUM 30 MG: 40 INJECTION SUBCUTANEOUS at 16:48

## 2020-10-02 RX ADMIN — MIRTAZAPINE 30 MG: 15 TABLET, ORALLY DISINTEGRATING ORAL at 20:31

## 2020-10-02 RX ADMIN — FERROUS SULFATE TAB 325 MG (65 MG ELEMENTAL FE) 325 MG: 325 (65 FE) TAB at 09:30

## 2020-10-02 RX ADMIN — ATORVASTATIN CALCIUM 20 MG: 20 TABLET, FILM COATED ORAL at 09:29

## 2020-10-02 RX ADMIN — Medication 10 ML: at 10:10

## 2020-10-02 RX ADMIN — SODIUM CHLORIDE, PRESERVATIVE FREE 10 ML: 5 INJECTION INTRAVENOUS at 18:32

## 2020-10-02 ASSESSMENT — PAIN DESCRIPTION - PROGRESSION
CLINICAL_PROGRESSION: NOT CHANGED
CLINICAL_PROGRESSION: NOT CHANGED

## 2020-10-02 ASSESSMENT — PAIN DESCRIPTION - LOCATION
LOCATION: HEAD
LOCATION: HEAD

## 2020-10-02 ASSESSMENT — PAIN DESCRIPTION - DESCRIPTORS
DESCRIPTORS: ACHING;HEADACHE;CONSTANT
DESCRIPTORS: HEADACHE

## 2020-10-02 ASSESSMENT — PAIN SCALES - GENERAL
PAINLEVEL_OUTOF10: 7
PAINLEVEL_OUTOF10: 6
PAINLEVEL_OUTOF10: 7
PAINLEVEL_OUTOF10: 4

## 2020-10-02 ASSESSMENT — PAIN DESCRIPTION - PAIN TYPE
TYPE: ACUTE PAIN
TYPE: ACUTE PAIN

## 2020-10-02 ASSESSMENT — PAIN DESCRIPTION - ONSET
ONSET: ON-GOING
ONSET: ON-GOING

## 2020-10-02 ASSESSMENT — PAIN - FUNCTIONAL ASSESSMENT: PAIN_FUNCTIONAL_ASSESSMENT: PREVENTS OR INTERFERES SOME ACTIVE ACTIVITIES AND ADLS

## 2020-10-02 ASSESSMENT — PAIN DESCRIPTION - FREQUENCY
FREQUENCY: CONTINUOUS
FREQUENCY: CONTINUOUS

## 2020-10-02 NOTE — CONSULTS
Adriana and Audrey Laming Dr. Jeanell Frankel      Patient Name: Merline Pablo  YOB: 1945  PCP: Lin Wright MD   Referring Provider:      Reason for Consultation:   Chief Complaint   Patient presents with    Shortness of Breath     x 1 month         History of Present Illness: This pt is a very pleasant 66 yo female who presented to the ER with progressive REYNAGA over the span of a few weeks on 9/29. She reports this improves with rest. CXR was negative for acute process. CBC on admission showed normal WBC and platelets, but a microcytic anemia with Hgb 8.3 and MCV 73. Iron profile showed ferritin of 36, but serum iron was only 12 and %sat was 4. Hgb today has down trended to 7.2 and MCV 71.9. Folate and B12 normal. ESR was 10. CMP showed CKD. She was diagnosed with CHF and was treated with IV diuretics with improvement in her symptoms (TTE showed LVEF of 81% and RVSP of 71 with severe pulmonary HTN).  Hematology asked to evaluate her anemia    Diagnostic Data:     Past Medical History:   Diagnosis Date    Acute on chronic diastolic (congestive) heart failure (HCC) 9/29/2020    Arthritis     GERD (gastroesophageal reflux disease)     History of blood transfusion     Hyperlipidemia     Migraines     Thyroid disease        Patient Active Problem List    Diagnosis Date Noted    CHF (congestive heart failure), NYHA class I, acute on chronic, combined ([address] 10/01/2020    Acute on chronic diastolic (congestive) heart failure ([address] 09/29/2020    Acute renal failure (ARF) ([address] 11/09/2019    BROOKLYN (acute kidney injury) ([address] 11/09/2019    Transient cerebral ischemia 07/02/2013    Arthritis, hip 02/07/2013    Avascular necrosis of bone of hip ([address] 02/07/2013    HLD (hyperlipidemia) 02/07/2013    Migraine 02/07/2013    PUD (peptic ulcer disease) 02/07/2013    Hypothyroid 02/07/2013        Past Surgical History:   Procedure Laterality Date    COLONOSCOPY  ENDOSCOPY, COLON, DIAGNOSTIC      FRACTURE SURGERY      left wrist, 2010-fx left hip    HYSTERECTOMY      JOINT REPLACEMENT      bilat knees    OTHER SURGICAL HISTORY Left 2-7-13    removal of hardware left hip left hip arthroplasty       Family History  History reviewed. No pertinent family history. Social History    TOBACCO:   reports that she has never smoked. She has never used smokeless tobacco.  ETOH:   reports no history of alcohol use. Home Medications  Prior to Admission medications    Medication Sig Start Date End Date Taking? Authorizing Provider   butalbital-aspirin-caffeine-codeine (FIORINAL/CODEINE #3) -13-30 MG capsule Take 1 capsule by mouth every 4-6 hours as needed for Pain. Yes Historical Provider, MD   levothyroxine (SYNTHROID) 75 MCG tablet Take 75 mcg by mouth Daily   Yes Historical Provider, MD   mirtazapine (REMERON) 30 MG tablet Take 30 mg by mouth nightly   Yes Historical Provider, MD   pantoprazole (PROTONIX) 40 MG tablet Take 40 mg by mouth daily   Yes Historical Provider, MD   sertraline (ZOLOFT) 50 MG tablet Take 50 mg by mouth daily   Yes Historical Provider, MD       Allergies  Allergies   Allergen Reactions    Penicillins Hives and Rash       Review of Systems: +SOB/REYNAGA, both improved from admission      Objective  BP (!) 102/53   Pulse 91   Temp 97.9 °F (36.6 °C) (Oral)   Resp 18   Ht 5' 2\" (1.575 m)   Wt 109 lb 14.4 oz (49.9 kg)   SpO2 95%   BMI 20.10 kg/m²     Physical Exam:   Performance Status:  General: AAO to person, place, time, in no acute distress, pleasant   Head and neck : PERRLA, EOMI . Sclera non icteric. Oropharynx : Clear  Neck: no JVD,  no adenopathy  LYMPHATICS : No LAD  Heart: Regular rate and regular rhythm, no murmur  Lungs: Clear to auscultation   Extremities: No edema,no cyanosis, no clubbing. Abdomen: Soft, non-tender;no masses, no organomegaly  Skin:  No rash  Neurologic:Cranial nerves grossly intact.  No focal motor or sensory

## 2020-10-02 NOTE — PLAN OF CARE
Problem: Pain:  Goal: Pain level will decrease  Description: Pain level will decrease  Outcome: Not Met This Shift  Note: Pt.  Was medicated for a headache     Problem: Falls - Risk of:  Goal: Will remain free from falls  Description: Will remain free from falls  Outcome: Met This Shift

## 2020-10-02 NOTE — PROGRESS NOTES
Physical Therapy    Physical Therapy Treatment Note    Room #:  0415/0415-01  Patient Name: Milind Maloney  YOB: 1945  MRN: [id number]    Referring Provider: NIC Jacinto CNP      Date of Service: 10/2/2020    Evaluating Physical Therapist: Yissel Toro, PT  Lic. # [id number]      Diagnosis: Acute on chronic diastolic (congestive) heart failure (HCC) [I50.33]  CHF (congestive heart failure), NYHA class I, acute on chronic, combined ([address] [I50.43]        Patient Active Problem List   Diagnosis    Arthritis, hip    Avascular necrosis of bone of hip ([address]    HLD (hyperlipidemia)    Migraine    PUD (peptic ulcer disease)    Hypothyroid    Transient cerebral ischemia    Acute renal failure (ARF) ([address]    BROOKLYN (acute kidney injury) ([address]    Acute on chronic diastolic (congestive) heart failure ([address]    CHF (congestive heart failure), NYHA class I, acute on chronic, combined ([address]        Tentative placement recommendation: Home Health Physical Therapy     Equipment recommendation: None      Prior Level of Function: Patient ambulated independently    Rehab Potential: good for baseline    Past medical history:   Past Medical History:   Diagnosis Date    Acute on chronic diastolic (congestive) heart failure ([address] 9/29/2020    Arthritis     GERD (gastroesophageal reflux disease)     History of blood transfusion     Hyperlipidemia     Migraines     Thyroid disease      Past Surgical History:   Procedure Laterality Date    COLONOSCOPY      ENDOSCOPY, COLON, DIAGNOSTIC      FRACTURE SURGERY      left wrist, 2010-fx left hip    HYSTERECTOMY      JOINT REPLACEMENT      bilat knees    OTHER SURGICAL HISTORY Left 2-7-13    removal of hardware left hip left hip arthroplasty     Precautions:  Up with assistance, falls and alarm ,      SUBJECTIVE:    Social history: Patient lives with son in a two story home resides first with 5 steps to enter with Pending sale to [facility] owned: Wheelchair, [email or web address] [place], Wheeled Walker and Bedside commode,       [facility] Basic Mobility        AM-PAC Mobility Inpatient   How much difficulty turning over in bed?: A Little  How much difficulty sitting down on / standing up from a chair with arms?: A Little  How much difficulty moving from lying on back to sitting on side of bed?: A Little  How much help from another person moving to and from a bed to a chair?: A Little  How much help from another person needed to walk in hospital room?: A Little  How much help from another person for climbing 3-5 steps with a railing?: A Lot  AM-PAC Inpatient Mobility Raw Score : 17  AM-PAC Inpatient T-Scale Score : 42.13  Mobility Inpatient CMS 0-100% Score: 50.57  Mobility Inpatient CMS G-Code Modifier : CK    Nursing cleared patient for PT treatment. Patient complained of headache. OBJECTIVE:   Initial Evaluation  Date: 9/30/20 Treatment Date:  10/2/2020       Short Term/ Long Term   Goals   Was pt agreeable to Eval/treatment? Yes   Yes To be met in 2 days   Pain level   2/10  abdomen No number assigned to headache pain    Bed Mobility    Rolling: Independent    Supine to sit: Independent    Sit to supine: Independent    Scooting: Independent   Rolling: Independent   Supine to sit: Independent   Sit to supine: Independent   Scooting: Independent        Transfers Sit to stand: Supervision    Sit to stand: Supervision       Sit to stand: Independent     Ambulation    10 feet and 40  feet using  cane with Supervision     Cues for proper cane technique and unsteady 2 x 120 feet using  no device with Supervision Cues were given for pacing and safety.   100 feet using  cane with Independent    Stair negotiation: ascended and descended   Not assessed       5 steps with rail supervision    ROM Within functional limits         Strength BUE: 3+/5  RLE:  3+/5  LLE:  3+/5   Increase strength in affected mm groups by 1/3 grade   Balance Sitting EOB:  good    Dynamic Standing:  fair minus; poor use of cane Sitting EOB: good   Dynamic Standing: good    Sitting EOB:  good    Dynamic Standing: fair        Patient is Alert & Oriented x person, place, time and situation  and follows directions    Edema:  no     Education: Patient was educated and facilitated on techniques to increase safety and independence with bed mobility, balance, functional transfers, and functional mobility. Patient was explained the the benefits of mobility and risks of immobility. ASSESSMENT:  Patient had decreased tolerance to ambulation due to headache and fatigue. Treatment:  Patient practiced and was instructed in the following treatment:      Patient transferred to side of bed and sat up x 3 minutes to increase dynamic sitting balance and activity tolerance. Patient stood and ambulated in hallway. Patient was returned to bed at end of treatment. Therapeutic Exercises: not performed      At end of session, patient in bed with call light and phone within reach, all lines and tubes intact, nursing notified. Patient would benefit from continued skilled Physical Therapy to improve functional independence and quality of life. PLAN:    Patient is making good progress towards PT goals and continues to need therapy for increased endurance.         Time in  3:25  Time out  3:33    CPT codes:     minutes units   Gait training ([id number]) 8 1   Therapeutic activities (98381 0 0   Therapeutic exercise (27559) 0 0   Neuro-facilitation techniques (80502) 0 0   No treatment 0 0   TOTAL MINUTES 8 1     Cosmo Danielson PTA   LIC# [id number]

## 2020-10-02 NOTE — PLAN OF CARE
Problem: Pain:  Goal: Pain level will decrease  Description: Pain level will decrease  10/2/2020 1028 by Rasta Benedict RN  Outcome: Met This Shift  10/2/2020 0522 by Que Ding RN  Outcome: Not Met This Shift  Note: Pt. Was medicated for a headache  Goal: Control of acute pain  Description: Control of acute pain  Outcome: Met This Shift  Goal: Control of chronic pain  Description: Control of chronic pain  Outcome: Met This Shift     Problem: Falls - Risk of:  Goal: Will remain free from falls  Description: Will remain free from falls  10/2/2020 1028 by Rasta Benedict RN  Outcome: Met This Shift  10/2/2020 0522 by Que Ding RN  Outcome: Met This Shift  Goal: Absence of physical injury  Description: Absence of physical injury  Outcome: Met This Shift     Problem:  Activity:  Goal: Capacity to carry out activities will improve  Description: Capacity to carry out activities will improve  Outcome: Met This Shift  Goal: Will verbalize the importance of balancing activity with adequate rest periods  Description: Will verbalize the importance of balancing activity with adequate rest periods  Outcome: Met This Shift     Problem: Cardiac:  Goal: Hemodynamic stability will improve  Description: Hemodynamic stability will improve  Outcome: Met This Shift  Goal: Ability to maintain an adequate cardiac output will improve  Description: Ability to maintain an adequate cardiac output will improve  Outcome: Met This Shift     Problem: Coping:  Goal: Verbalizations of decreased anxiety will decrease  Description: Verbalizations of decreased anxiety will decrease  Outcome: Met This Shift     Problem: Fluid Volume:  Goal: Risk for excess fluid volume will decrease  Description: Risk for excess fluid volume will decrease  Outcome: Met This Shift  Goal: Maintenance of adequate hydration will improve  Description: Maintenance of adequate hydration will improve  Outcome: Met This Shift  Goal: Will show no signs and symptoms of electrolyte imbalance  Description: Will show no signs and symptoms of electrolyte imbalance  Outcome: Met This Shift     Problem: Health Behavior:  Goal: Ability to manage health-related needs will improve  Description: Ability to manage health-related needs will improve  Outcome: Met This Shift  Goal: Ability to seek appropriate health care will improve  Description: Ability to seek appropriate health care will improve  Outcome: Met This Shift     Problem: Nutritional:  Goal: Maintenance of adequate nutrition will improve  Description: Maintenance of adequate nutrition will improve  Outcome: Met This Shift     Problem: Physical Regulation:  Goal: Complications related to the disease process, condition or treatment will be avoided or minimized  Description: Complications related to the disease process, condition or treatment will be avoided or minimized  Outcome: Met This Shift     Problem: Respiratory:  Goal: Ability to maintain adequate ventilation will improve  Description: Ability to maintain adequate ventilation will improve  Outcome: Met This Shift  Goal: Respiratory status will improve  Description: Respiratory status will improve  Outcome: Met This Shift

## 2020-10-02 NOTE — PROGRESS NOTES
OCCUPATIONAL THERAPY  Bedside Treatment Note    Date:10/2/2020  Patient Name: Megan Moore  MRN: [id number]  : 1945  Room: [address] [place] [address]     Referring Provider: NIC Dalton CNP   Evaluating OT: Gómez Bryson OTR/L [id number]     Placement Recommendation: Georges Eisenberg if needed    Recommended Adaptive Equipment: none      [facility]   AM-PAC Daily Activity Inpatient   How much help for putting on and taking off regular lower body clothing?: A Little  How much help for Bathing?: A Little  How much help for Toileting?: A Little  How much help for putting on and taking off regular upper body clothing?: A Little  How much help for taking care of personal grooming?: A Little  How much help for eating meals?: None  AM-PAC Inpatient Daily Activity Raw Score: 19  AM-PAC Inpatient ADL T-Scale Score : 40.22  ADL Inpatient CMS 0-100% Score: 42.8  ADL Inpatient CMS G-Code Modifier : CK     Based on patient's functional performance as stated below and their level of assistance needed prior to admission, this therapist believes that the patient would benefit from skilled Occupational Therapy following their hospital stay in an effort to increase safety and independence with completion of ADL/IADL tasks for functional independence and quality of life.      Diagnosis:   1. Acute on chronic diastolic CHF (congestive heart failure) ([facility] [address]    2. Exertional dyspnea       Pertinent Medical History:   Past Medical History        Past Medical History:   Diagnosis Date    Acute on chronic diastolic (congestive) heart failure (HCC) 2020    Arthritis      GERD (gastroesophageal reflux disease)      History of blood transfusion      Hyperlipidemia      Migraines      Thyroid disease           Precautions:  falls  Pain Scale: Numeric Rate: no pain; Nursing notified.                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 good  Problem solving skills              good  Memory               good  Sequencing              good  Judgement/safety  Communication: impaired   Visual perceptual skills: impaired                Glasses: yes   Edema: no     Sensation: intact   Hand Dominance:  Left     X  Right       Left Right Comment   Passive range of motion [facility]      Active range of motion [facility]      Muscle Grade 4/5 4/5     /pinch Strength Intact  Intact         Functional Assessment:     Initial Evaluation Status Date:   10/1/2020 Treatment Status  Date: 10/2/2020 STGs = LTGs  Time frame: 5 - 14 days   Feeding Independent to bring food to mouth   Independent to bring cup to mouth Independent    Grooming Supervision at sink level   Supervision with set up at sink level to brush teeth  Supervision with set up to soak dentures in new denture cup with Polident   Independent    UB Dressing Supervision   Independent    LB Dressing Minimal Assist to adjust and pull socks up  Supervision at EOB to don socks. Independent    Bathing Minimal Assist   Independent    Toileting Supervision   Supervision for hygiene after toileting on commode  Independent    Bed Mobility  Facilitated Supine to sit: Supervision   Scooting:Supervision  Sit to supine: Supervision     Rolling: Supervision  Pt declined sitting up in chair so bed was placed in chair position. Pt instructed on how to return to bed setting.  Facilitated Supine to sit: supervision. Supervision for scooting to edge of bed. Supervision for sit to supine. Supine to sit: Independent   Scooting:Independent  Sit to supine: Independent   Rolling: Independent   Functional Transfers Supervision from EOB. Supervision to and from commode with verbal cues to use grab bar for safe commode transfer. Transfer training with verbal cues for hand placement throughout session to improve safety. Supervision from EOB  Minimal assist for transfer to and from low commode. Independent    Functional Mobility Minimal assist with no device to and from the bathroom  Minimal Assist with no device to and from bathroom Independent    Activity Tolerance Fair  Fair  Good       Balance:              Sitting balance at EOB to increase dynamic sitting balance and activities tolerance for 10 minutes with Supervision, donned socks at EOB               Standing with no device              Endurance: fair      Comments: Upon arrival to the room the patient was supine. At end of the session, the patient was returned to bed and table placed across patient in anticipation of lunch. Call light and phone within reach. Pt required verbal cues and instruction as noted above for safe facilitation and completion of tasks. Therapist provided skilled monitoring of the patient's response during treatment session. Prior to and at the end of session, environmental modifications/line management completed for patients safety and efficiency of treatment session. OT services provided to include instruction/training on safety and adapted techniques for completion of transfers and ADL's.       Overall, the patient demonstrates difficulties with completion of BADL's and IADL's. Factors contributing to these difficulties includes decreased endurance and generalized weakness.  Pt would benefit from continued skilled OT to increase independence with BADL's and IADL's.      Treatment:    Bed mobility: Facilitated bed mobility with cues for proper body mechanics and sequencing to prepare for ADL completion. Functional transfers: Facilitated transfers from various surfaces with cues for body alignment, safety and hand placement. ADL completion: Self-care retraining for the above-mentioned ADLs; training on proper hand placement, safety technique, sequencing, and energy conservation techniques. Postural Balance: Sitting and standing balance retraining to improve righting reactions with postural changes during ADLs. Skilled positioning: Proper positioning to improve interaction with environment, overall functioning and decrease/prevent edema and contractures.     Evaluation Complexity:   · Low Complexity  ? History: Brief history including review of medical records relating to the problem  ? Exam: 1-3 performance Deficits  ? Assistance/Modification: No assistance or modifications required to perform tasks. No comorbities affecting occupational performance.     Assessment of current deficits:   Functional mobility [x]? ADLs [x]? Strength [x]? Cognition []? Functional transfers  [x]? IADLs [x]? Safety Awareness []? Endurance [x]? Fine Motor Coordination []? Balance [x]? Vision/perception []? Sensation []? Gross Motor Coordination []? ROM []? Delirium []? Motor Control []?     Plan of Care: OT 1-3x/week for 5-7 days PRN   ADL retraining/AE recommendations   Functional Transfer Training   Therapeutic Activity   Functional Mobility Training   Energy Conservation Techniques/Strategies   Positioning to Improve Functional [place], Safety, and Skin Integrity   Patient and/or Family Education to Increase Safety and Functional [place]                               Rehab Potential: Good for established goals developed with patient and family.       Patient / Family Goal: return home    Patient and/or family were instructed on functional diagnosis, prognosis/goals and OT plan of care.  Demonstrated fair understanding.     Evaluation time includes thorough review of current medical information, gathering information on past medical history/social history and prior level of function, completion of standardized testing/informal observation of tasks, assessment of data, and development of POC/Goals.     Time In: 11:06am    Time Out: 11:30am                   Min Units   OT Eval Low 95902  x      OT Eval Medium 32517       OT Eval High 18405       OT Re-Eval 07232               ADL/Self Care 65628 16  1   Therapeutic Activities 51860 8  1   Therapeutic Ex 07103       Orthotic Management 93012       Neuro Re-Ed 69672       Non-Billable Time       TOTAL TIMED TREATMENT 24 2      Shaheen Barrios OTR/L [id number]

## 2020-10-02 NOTE — CARE COORDINATION
SS NOTE: NO COVID TESTING DONE. Pt continues to decline [facility] referral.  SW met with pt to discuss possible IV Infusion at the [facility]. Pt relates that her son Jose Snowden (he lives with her) [phone] will transport her. If IV Infusion ordered as an out pt - referral will need to be completed to the [facility] [phone]/ fax: [phone]. Alexia Gamez. 10/2/2020.3:23 PM.

## 2020-10-02 NOTE — PROGRESS NOTES
Internal Medicine Progress Note    [facility]    Shaila Anglin. Lona Dumont, SJOJOEY Lakeckbaljeet Valladares D.O., DEBI Mendoza, MSN, APRN, NP-C  Sriram Lopez. Katie Mcnally, MSN, APRN-CNP     Primary Care Physician: Tali Hall MD   Admitting Physician:  Shari Carpenter DO  Admission date and time: 9/29/2020 12:16 PM    Room:  [address] [place] [address]  Admitting diagnosis: Acute on chronic diastolic (congestive) heart failure (HCC) [I50.33]  CHF (congestive heart failure), NYHA class I, acute on chronic, combined [facility]) [I50.43]    Patient Name: Aston Rodgers  MRN: [id number]    Date of Service: 10/2/2020     Subjective:  Juan Fernandez is a 67 y.o. female who was seen and examined today,10/2/2020, at the bedside. Juan Fernandez states that she does feel better today but not back to baseline. Discussed lab work and need for further evaluation of anemia. She is agreeable. States that she has been scoped in the past by Dr. Cheryle Snook. Does not recall findings. Review of System:   Constitutional:   Improving weakness and fatigue. HEENT:   Denies ear pain, sore throat, sinus or eye problems. Cardiovascular:   Denies any chest pain, irregular heartbeats, or palpitations. Respiratory:   Shortness of breath seems to be improving. Occurs more with exertion. Gastrointestinal:   Denies nausea, vomiting, diarrhea, or constipation. Denies any abdominal pain. Genitourinary:    Denies any urgency, frequency, hematuria. Voiding  without difficulty. Extremities:   Denies lower extremity swelling, edema or cyanosis. Neurology:    Denies any headache or focal neurological deficits, admits to generalized weakness and deconditioning  Psch:   Denies being anxious or depressed. Musculoskeletal:    Denies  myalgias, joint complaints or back pain. Integumentary:   Denies any rashes, ulcers, or excoriations. Denies bruising.   Hematologic/Lymphatic:  Denies bruising or bleeding. Physical Exam:  I/O this shift:  In: 120 [P.O.:120]  Out: -     Intake/Output Summary (Last 24 hours) at 10/2/2020 0633  Last data filed at 10/2/2020 0603  Gross per 24 hour   Intake 850 ml   Output --   Net 850 ml   I/O last 3 completed shifts: In: 65 [P.O.:840; I.V.:10]  Out: 300 [Urine:300]  Patient Vitals for the past 96 hrs (Last 3 readings):   Weight   10/02/20 0515 109 lb 14.4 oz (49.9 kg)   10/01/20 0453 108 lb 12.8 oz (49.4 kg)   09/30/20 0620 108 lb 12.8 oz (49.4 kg)     Vital Signs:   Blood pressure (!) 113/51, pulse 87, temperature 98 °F (36.7 °C), temperature source Oral, resp. rate 16, height 5' 2\" (1.575 m), weight 109 lb 14.4 oz (49.9 kg), SpO2 93 %. General appearance:  Alert, responsive, oriented to person, place, and time. Well preserved, alert, no distress. Head:  Normocephalic. No masses, lesions or tenderness. Eyes:  PERRLA. EOMI. Sclera clear. ENT:  Ears normal. Mucosa normal.  Neck:    Supple. Trachea midline. No thyromegaly. No JVD. No bruits. Heart:    Rhythm regular. Rate controlled. Mild systolic murmur. Lungs:    Symmetrical. Clear to auscultation bilaterally. No wheezes. No rhonchi. No rales. Abdomen:   Soft. Non-tender. Non-distended. Bowel sounds positive. No organomegaly or masses. No pain on palpation. Extremities:    Peripheral pulses present. No peripheral edema. No ulcers. No cyanosis. No clubbing. Neurologic:    Alert x 3. No focal deficit. Cranial nerves grossly intact. No focal weakness. Psych:   Behavior is normal. Mood appears normal. Speech is not rapid and/or pressured. Musculoskeletal:   Spine ROM normal. Muscular strength intact. Gait not assessed. Integumentary:  No rashes  Skin normal color and texture.   Genitalia/Breast:  Deferred    Medication:  Scheduled Meds:   ferrous sulfate  325 mg Oral BID WC    potassium chloride  40 mEq Oral Daily    levothyroxine  75 mcg Oral Daily    mirtazapine  30 mg Oral Nightly    pantoprazole 40 mg Oral QAM AC    rOPINIRole  1 mg Oral Nightly    atorvastatin  20 mg Oral Daily    sodium chloride flush  10 mL Intravenous 2 times per day    aspirin  81 mg Oral Daily    enoxaparin  30 mg Subcutaneous Daily     Continuous Infusions:    Objective Data:  CBC with Differential:    Lab Results   Component Value Date    WBC 7.0 10/02/2020    RBC 3.52 10/02/2020    HGB 7.2 10/02/2020    HCT 25.3 10/02/2020     10/02/2020    MCV 71.9 10/02/2020    MCH 20.5 10/02/2020    MCHC 28.5 10/02/2020    RDW 18.8 10/02/2020    NRBC 1.0 10/01/2020    SEGSPCT 62 02/09/2013    LYMPHOPCT 28.0 10/02/2020    MONOPCT 7.0 10/02/2020    BASOPCT 0.0 10/02/2020    MONOSABS 0.49 10/02/2020    LYMPHSABS 1.96 10/02/2020    EOSABS 0.00 10/02/2020    BASOSABS 0.00 10/02/2020     BMP:    Lab Results   Component Value Date     10/02/2020    K 4.2 10/02/2020     10/02/2020    CO2 23 10/02/2020    BUN 17 10/02/2020    LABALBU 3.2 11/10/2019    LABALBU 3.5 06/09/2011    CREATININE 1.9 10/02/2020    CALCIUM 8.7 10/02/2020    GFRAA 31 10/02/2020    LABGLOM 26 10/02/2020    GLUCOSE 93 10/02/2020    GLUCOSE 167 06/10/2011       Assessment:  1. Acute on chronic diastolic congestive heart failure  2. Acute on chronic kidney disease stage III  3. Microcytic anemia of chronic disease  4. Hyperlipidemia   5. Hypothyroidism   6. Gastroesophageal reflux disease with hiatal hernia  7. Moderate protein calorie malnutrition    Plan:   Reviewed labs and patient appears to have ongoing issue with anemia. Discussed consulting GI as well as hematology for further evaluation patient is agreeable. Patient has been seen by Dr. Katie Orosco in the past therefore he will be consulted at this time. Patient does have chronic kidney disease stage III therefore this is likely a component of the anemia however to be thorough we will obtain further evaluation.   She is iron level was found to be 12 therefore will add IV iron to patient's current medication regimen. As per hematology patient should have 5 doses therefore if discharged prior to that we will need to make arrangement for outpatient IV iron supplementation. Will continue monitor the patient's CBC with plan to transfuse if hemoglobin is less than 7. Encouraged the patient to increase her activity. We will continue to monitor the patient's daily weights and intake and output. Diuretic therapy as warranted. Slight improvement in kidney function noted. Services for discharge planning. She is deferring home health care upon discharge. More than 50% of my  time was spent at the bedside counseling/coordinating care with the patient and/or family with face to face contact. This time was spent reviewing notes and laboratory data as well as instructing and counseling the patient. Time I spent with the family or surrogate(s) is included only if the patient was incapable of providing the necessary information or participating in medical decisions. I also discussed the differential diagnosis and all of the proposed management plans with the patient and individuals accompanying the patient. [place] requires this high level of physician care and nursing on the IMC/Telemetry unit due the complexity of decision management and chance of rapid decline or death. Continued cardiac monitoring and higher level of nursing are required. I am readily available for any further decision-making and intervention.      Juma Roe DO, F.A.C.O.I.  10/2/2020  6:33 AM

## 2020-10-02 NOTE — CONSULTS
chronic kidney disease, and  gastroesophageal reflux disease and migraine headache and arthritis with  neck and back pain and depression. PAST SURGICAL HISTORY:  Includes hysterectomy, total hip replacement,  knee replacement and wrist surgery. SOCIAL HISTORY:  She denies smoking, alcohol, or drug abuse. MEDICATIONS:  At home, she was on levothyroxine, pantoprazole,  mirtazapine, ropinirole, sertraline, simvastatin, tizanidine, Xarelto,  _____, aspirin, caffeine, and codeine. FAMILY HISTORY:  Noncontributory. REVIEW OF SYSTEMS:  All systems reviewed and unrevealing except per  history. LABS:  Sodium 157, potassium 4.2, BUN 17, creatinine 1.9, glucose 93,  albumin is _____ 03:37 WBC 7, hemoglobin 7.2 with hematocrit 25.3,  platelet count 424. Her iron was 12 and ferritin 36, and iron  saturation was 4. PHYSICAL EXAMINATION:  VITAL SIGNS:  Blood pressure 102/53, pulse 91, respirations 18,  temperature 97.9. GENERAL:  Awake, oriented x3, in no acute distress. HEENT:  Normocephalic, atraumatic. Pupils equally reactive to light. Extraocular muscles are intact. Conjunctivae injected. Sclerae  icteric. NECK:  Supple. No palpable cervical lymphadenopathy. No palpable  thyromegaly. HEART:  S1, S2. No murmur, no gallops. LUNGS:  Decreased breathing sounds bilateral.  No wheeze, no crackles. ABDOMEN:  Soft, benign. Positive bowel sounds. No rebound. No  tenderness. EXTREMITIES:  No edema. Positive pulses. SKIN:  No ecchymosis, no cyanosis, and no clubbing. ASSESSMENT AND PLAN:  This is a 58-year-old female who is known to have  history of iron deficiency anemia, blood count level was running between  8 and 8.5 at the end of 2019, admitted to the hospital with dyspnea on  exertion and shortness of breath, was diagnosed with acute on chronic  diastolic heart failure with acute-on-chronic chronic kidney disease. Her anemia has worsened and hemoglobin dropped to 7.2.   The

## 2020-10-03 ENCOUNTER — APPOINTMENT (OUTPATIENT)
Dept: GENERAL RADIOLOGY | Age: 75
DRG: 292 | End: 2020-10-03
Payer: MEDICARE

## 2020-10-03 LAB
ANION GAP SERPL CALCULATED.3IONS-SCNC: 13 MMOL/L (ref 7–16)
BASOPHILS ABSOLUTE: 0 E9/L (ref 0–0.2)
BASOPHILS RELATIVE PERCENT: 0 % (ref 0–2)
BUN BLDV-MCNC: 17 MG/DL (ref 8–23)
CALCIUM SERPL-MCNC: 9 MG/DL (ref 8.6–10.2)
CHLORIDE BLD-SCNC: 105 MMOL/L (ref 98–107)
CO2: 23 MMOL/L (ref 22–29)
CREAT SERPL-MCNC: 1.8 MG/DL (ref 0.5–1)
EOSINOPHILS ABSOLUTE: 0 E9/L (ref 0.05–0.5)
EOSINOPHILS RELATIVE PERCENT: 0 % (ref 0–6)
GFR AFRICAN AMERICAN: 33
GFR NON-AFRICAN AMERICAN: 27 ML/MIN/1.73
GLUCOSE BLD-MCNC: 98 MG/DL (ref 74–99)
HCT VFR BLD CALC: 27.5 % (ref 34–48)
HEMOGLOBIN: 7.8 G/DL (ref 11.5–15.5)
HYPOCHROMIA: ABNORMAL
LV EF: 80 %
LVEF MODALITY: NORMAL
LYMPHOCYTES ABSOLUTE: 0.95 E9/L (ref 1.5–4)
LYMPHOCYTES RELATIVE PERCENT: 12 % (ref 20–42)
MAGNESIUM: 2 MG/DL (ref 1.6–2.6)
MCH RBC QN AUTO: 20.3 PG (ref 26–35)
MCHC RBC AUTO-ENTMCNC: 28.4 % (ref 32–34.5)
MCV RBC AUTO: 71.4 FL (ref 80–99.9)
MONOCYTES ABSOLUTE: 0.4 E9/L (ref 0.1–0.95)
MONOCYTES RELATIVE PERCENT: 5 % (ref 2–12)
MYELOCYTE PERCENT: 1 % (ref 0–0)
NEUTROPHILS ABSOLUTE: 6.56 E9/L (ref 1.8–7.3)
NEUTROPHILS RELATIVE PERCENT: 82 % (ref 43–80)
PDW BLD-RTO: 19.9 FL (ref 11.5–15)
PHOSPHORUS: 2.3 MG/DL (ref 2.5–4.5)
PLATELET # BLD: 396 E9/L (ref 130–450)
PMV BLD AUTO: 10.1 FL (ref 7–12)
POLYCHROMASIA: ABNORMAL
POTASSIUM SERPL-SCNC: 5.4 MMOL/L (ref 3.5–5)
RBC # BLD: 3.85 E12/L (ref 3.5–5.5)
SODIUM BLD-SCNC: 141 MMOL/L (ref 132–146)
WBC # BLD: 7.9 E9/L (ref 4.5–11.5)

## 2020-10-03 PROCEDURE — 36415 COLL VENOUS BLD VENIPUNCTURE: CPT

## 2020-10-03 PROCEDURE — 6360000002 HC RX W HCPCS: Performed by: INTERNAL MEDICINE

## 2020-10-03 PROCEDURE — 83735 ASSAY OF MAGNESIUM: CPT

## 2020-10-03 PROCEDURE — 78452 HT MUSCLE IMAGE SPECT MULT: CPT | Performed by: INTERNAL MEDICINE

## 2020-10-03 PROCEDURE — 6370000000 HC RX 637 (ALT 250 FOR IP): Performed by: NURSE PRACTITIONER

## 2020-10-03 PROCEDURE — 3430000000 HC RX DIAGNOSTIC RADIOPHARMACEUTICAL: Performed by: RADIOLOGY

## 2020-10-03 PROCEDURE — 2580000003 HC RX 258: Performed by: NURSE PRACTITIONER

## 2020-10-03 PROCEDURE — 6370000000 HC RX 637 (ALT 250 FOR IP): Performed by: INTERNAL MEDICINE

## 2020-10-03 PROCEDURE — 85025 COMPLETE CBC W/AUTO DIFF WBC: CPT

## 2020-10-03 PROCEDURE — 74250 X-RAY XM SM INT 1CNTRST STD: CPT

## 2020-10-03 PROCEDURE — 6360000002 HC RX W HCPCS: Performed by: NURSE PRACTITIONER

## 2020-10-03 PROCEDURE — 93017 CV STRESS TEST TRACING ONLY: CPT

## 2020-10-03 PROCEDURE — 2500000003 HC RX 250 WO HCPCS: Performed by: INTERNAL MEDICINE

## 2020-10-03 PROCEDURE — 84100 ASSAY OF PHOSPHORUS: CPT

## 2020-10-03 PROCEDURE — 1200000000 HC SEMI PRIVATE

## 2020-10-03 PROCEDURE — 80048 BASIC METABOLIC PNL TOTAL CA: CPT

## 2020-10-03 PROCEDURE — A9500 TC99M SESTAMIBI: HCPCS | Performed by: RADIOLOGY

## 2020-10-03 PROCEDURE — 2580000003 HC RX 258: Performed by: INTERNAL MEDICINE

## 2020-10-03 RX ADMIN — ATORVASTATIN CALCIUM 20 MG: 20 TABLET, FILM COATED ORAL at 15:41

## 2020-10-03 RX ADMIN — ACETAMINOPHEN 650 MG: 325 TABLET, FILM COATED ORAL at 15:39

## 2020-10-03 RX ADMIN — MIRTAZAPINE 30 MG: 15 TABLET, ORALLY DISINTEGRATING ORAL at 20:58

## 2020-10-03 RX ADMIN — ENOXAPARIN SODIUM 30 MG: 40 INJECTION SUBCUTANEOUS at 15:28

## 2020-10-03 RX ADMIN — SODIUM CHLORIDE 125 MG: 9 INJECTION, SOLUTION INTRAVENOUS at 08:59

## 2020-10-03 RX ADMIN — REGADENOSON 0.4 MG: 0.08 INJECTION, SOLUTION INTRAVENOUS at 10:06

## 2020-10-03 RX ADMIN — BARIUM SULFATE 176 G: 960 POWDER, FOR SUSPENSION ORAL at 11:49

## 2020-10-03 RX ADMIN — SODIUM CHLORIDE, PRESERVATIVE FREE 10 ML: 5 INJECTION INTRAVENOUS at 15:29

## 2020-10-03 RX ADMIN — ASPIRIN 81 MG CHEWABLE TABLET 81 MG: 81 TABLET CHEWABLE at 15:40

## 2020-10-03 RX ADMIN — ROPINIROLE 1 MG: 1 TABLET, FILM COATED ORAL at 20:58

## 2020-10-03 RX ADMIN — PYRIDOXINE HCL TAB 50 MG 50 MG: 50 TAB at 15:40

## 2020-10-03 RX ADMIN — METOPROLOL SUCCINATE 25 MG: 25 TABLET, EXTENDED RELEASE ORAL at 15:40

## 2020-10-03 RX ADMIN — FERROUS SULFATE TAB 325 MG (65 MG ELEMENTAL FE) 325 MG: 325 (65 FE) TAB at 15:28

## 2020-10-03 RX ADMIN — Medication 30 MILLICURIE: at 11:15

## 2020-10-03 RX ADMIN — Medication 10 ML: at 08:59

## 2020-10-03 RX ADMIN — Medication 10 ML: at 20:59

## 2020-10-03 ASSESSMENT — PAIN DESCRIPTION - ORIENTATION
ORIENTATION: ANTERIOR
ORIENTATION: ANTERIOR

## 2020-10-03 ASSESSMENT — PAIN DESCRIPTION - LOCATION
LOCATION: HEAD
LOCATION: HEAD

## 2020-10-03 ASSESSMENT — PAIN SCALES - GENERAL
PAINLEVEL_OUTOF10: 6
PAINLEVEL_OUTOF10: 7
PAINLEVEL_OUTOF10: 8

## 2020-10-03 ASSESSMENT — PAIN DESCRIPTION - ONSET
ONSET: ON-GOING
ONSET: ON-GOING

## 2020-10-03 ASSESSMENT — PAIN DESCRIPTION - FREQUENCY
FREQUENCY: CONTINUOUS
FREQUENCY: CONTINUOUS

## 2020-10-03 ASSESSMENT — PAIN DESCRIPTION - DESCRIPTORS
DESCRIPTORS: ACHING;CONSTANT;HEADACHE
DESCRIPTORS: ACHING;HEADACHE;CONSTANT

## 2020-10-03 ASSESSMENT — PAIN DESCRIPTION - PAIN TYPE
TYPE: ACUTE PAIN
TYPE: ACUTE PAIN

## 2020-10-03 ASSESSMENT — PAIN DESCRIPTION - PROGRESSION
CLINICAL_PROGRESSION: NOT CHANGED

## 2020-10-03 NOTE — PROGRESS NOTES
Internal Medicine Progress Note    [facility]    [facility]. Twan Brewster., [email or web address]. John Parsons D.O., DEBI Echeverria, MSN, APRN, NP-C  Betsytayo Cobb. Kurt Go, MSN, APRN-CNP     Primary Care Physician: Jena Sparrow MD   Admitting Physician:  Lianne Starks DO  Admission date and time: 9/29/2020 12:16 PM    Room:  [address] [place] [address]  Admitting diagnosis: Acute on chronic diastolic (congestive) heart failure (HCC) [I50.33]  CHF (congestive heart failure), NYHA class I, acute on chronic, combined [facility]) [I50.43]    Patient Name: Dedrick Chaney  MRN: [id number]    Date of Service: 10/3/2020     Subjective:  Tiana Gary is a 67 y.o. female who was seen and examined today,10/3/2020, at the bedside. The patient is doing much better today. The patient has been seen by gastroenterology and recommendation has been follow. The patient has also been seen by hematology. She is current receiving IV iron. Stress test is planned for today    Review of System:   Constitutional:   Improving weakness and fatigue. HEENT:   Denies ear pain, sore throat, sinus or eye problems. Cardiovascular:   Denies any chest pain, irregular heartbeats, or palpitations. Respiratory:   Shortness of breath seems to be improving. Occurs more with exertion. Gastrointestinal:   Denies nausea, vomiting, diarrhea, or constipation. Denies any abdominal pain. Genitourinary:    Denies any urgency, frequency, hematuria. Voiding  without difficulty. Extremities:   Denies lower extremity swelling, edema or cyanosis. Neurology:    Denies any headache or focal neurological deficits, admits to generalized weakness and deconditioning  Psch:   Denies being anxious or depressed. Musculoskeletal:    Denies  myalgias, joint complaints or back pain. Integumentary:   Denies any rashes, ulcers, or excoriations. Denies bruising.   Hematologic/Lymphatic:  Denies bruising or Daily    levothyroxine  75 mcg Oral Daily    mirtazapine  30 mg Oral Nightly    pantoprazole  40 mg Oral QAM AC    rOPINIRole  1 mg Oral Nightly    atorvastatin  20 mg Oral Daily    sodium chloride flush  10 mL Intravenous 2 times per day    aspirin  81 mg Oral Daily    enoxaparin  30 mg Subcutaneous Daily     Continuous Infusions:    Objective Data:  CBC with Differential:    Lab Results   Component Value Date    WBC 7.9 10/03/2020    RBC 3.85 10/03/2020    HGB 7.8 10/03/2020    HCT 27.5 10/03/2020     10/03/2020    MCV 71.4 10/03/2020    MCH 20.3 10/03/2020    MCHC 28.4 10/03/2020    RDW 19.9 10/03/2020    NRBC 1.0 10/01/2020    SEGSPCT 62 02/09/2013    LYMPHOPCT 12.0 10/03/2020    MONOPCT 5.0 10/03/2020    MYELOPCT 1.0 10/03/2020    BASOPCT 0.0 10/03/2020    MONOSABS 0.40 10/03/2020    LYMPHSABS 0.95 10/03/2020    EOSABS 0.00 10/03/2020    BASOSABS 0.00 10/03/2020     BMP:    Lab Results   Component Value Date     10/03/2020    K 5.4 10/03/2020     10/03/2020    CO2 23 10/03/2020    BUN 17 10/03/2020    LABALBU 3.2 11/10/2019    LABALBU 3.5 06/09/2011    CREATININE 1.8 10/03/2020    CALCIUM 9.0 10/03/2020    GFRAA 33 10/03/2020    LABGLOM 27 10/03/2020    GLUCOSE 98 10/03/2020    GLUCOSE 167 06/10/2011       Assessment:    · Acute on chronic diastolic congestive heart failure--with hyperdynamic function with EF 80%  · Acute on chronic kidney disease stage III  · Microcytic anemia of chronic disease  · Hyperlipidemia   · Hypothyroidism   · Gastroesophageal reflux disease with hiatal hernia  · Moderate protein calorie malnutrition    Plan:     · Patient has been seen by gastroenterology and plan for outpatient follow-up with probable capsule endoscopy  · Patient has been seen by hematology and is current receiving IV iron therapy  · Beta-blocker has been added due to hyperdynamic function  · Increase activity and observe rhythm  · IV Lexiscan stress test today    Services for discharge planning. She is deferring home health care upon discharge. More than 50% of my  time was spent at the bedside counseling/coordinating care with the patient and/or family with face to face contact. This time was spent reviewing notes and laboratory data as well as instructing and counseling the patient. Time I spent with the family or surrogate(s) is included only if the patient was incapable of providing the necessary information or participating in medical decisions. I also discussed the differential diagnosis and all of the proposed management plans with the patient and individuals accompanying the patient. [place] requires this high level of physician care and nursing on the Telemetry unit due the complexity of decision management and chance of rapid decline or death. Continued cardiac monitoring and higher level of nursing are required. I am readily available for any further decision-making and intervention.      Bethel Roe DO, F.A.C.O.I.  10/3/2020  11:12 AM

## 2020-10-03 NOTE — PROCEDURES
[address]                              CARDIAC STRESS TEST    PATIENT NAME: Tyler Gresham                    :        1945  MED REC NO:   [id number]                            ROOM:       9544  ACCOUNT NO:   [de-identified]                           ADMIT DATE: 2020  PROVIDER:     Yesi Peters DO    CARDIOVASCULAR DIAGNOSTIC DEPARTMENT    DATE OF STUDY:  10/03/2020    PROCEDURE:  Lexiscan stress test.    Intravenous Lexiscan stress test was performed using a nuclear medicine  in the form of Cardiolite for evaluation of chest pain with a resting  EKG to be a normal sinus mechanism. Ventricular rate was 91 beats per  minute. The [place] interval was normal.  QRS complex was normal in duration  and axis. Isolated PACs were present. Nonspecific ST-T abnormalities  were noted. Blood pressure at the beginning of the stress test was  102/57. With the aid of nuclear medicine in the form of Cardiolite, the  patient was connected to continuous cardiac monitoring. Intravenous  Lexiscan at 0.4 mg was infused over a 10-second period. Immediately,  after infusion of Lexiscan, the patient was injected with Cardiolite and  the test was terminated at 1 minute. The patient was observed in the  recovery phase for 5 minutes. Maximum heart rate achieved during  administration of Lexiscan was 127 beats per minute. This was 88% of  maximum predicted. Blood pressure was stable during infusion at 102/57. Electrographically, there was no definite evidence of Lexiscan-induced  ischemia. Isolated PACs and PVCs were present. The patient tolerated  the infusion well. The patient was transferred to Nuclear Department  for Cardiolite imaging. In conclusion, no adverse effects of Lexiscan  infusion. Clinical correlation is recommended.         Josue Kimball DO    D: 10/03/2020 18:13:58       T: 10/03/2020 18:40:31 [email or web address]  Job#: [id number]     Doc#: [id number]    CC:

## 2020-10-03 NOTE — PROGRESS NOTES
02/07/2013    Migraine 02/07/2013    PUD (peptic ulcer disease) 02/07/2013    Hypothyroid 02/07/2013        Past Surgical History:   Procedure Laterality Date    COLONOSCOPY      ENDOSCOPY, COLON, DIAGNOSTIC      FRACTURE SURGERY      left wrist, 2010-fx left hip    HYSTERECTOMY      JOINT REPLACEMENT      bilat knees    OTHER SURGICAL HISTORY Left 2-7-13    removal of hardware left hip left hip arthroplasty       Family History  History reviewed. No pertinent family history. Social History    TOBACCO:   reports that she has never smoked. She has never used smokeless tobacco.  ETOH:   reports no history of alcohol use. Home Medications  Prior to Admission medications    Medication Sig Start Date End Date Taking? Authorizing Provider   butalbital-aspirin-caffeine-codeine (FIORINAL/CODEINE #3) -89-30 MG capsule Take 1 capsule by mouth every 4-6 hours as needed for Pain. Yes Historical Provider, MD   levothyroxine (SYNTHROID) 75 MCG tablet Take 75 mcg by mouth Daily   Yes Historical Provider, MD   mirtazapine (REMERON) 30 MG tablet Take 30 mg by mouth nightly   Yes Historical Provider, MD   pantoprazole (PROTONIX) 40 MG tablet Take 40 mg by mouth daily   Yes Historical Provider, MD   sertraline (ZOLOFT) 50 MG tablet Take 50 mg by mouth daily   Yes Historical Provider, MD       Allergies  Allergies   Allergen Reactions    Penicillins Hives and Rash       Review of Systems: +SOB/REYNAGA, both improved from admission      Objective  BP (!) 111/57   Pulse 79   Temp 98.4 °F (36.9 °C) (Oral)   Resp 18   Ht 5' 2\" (1.575 m)   Wt 105 lb (47.6 kg)   SpO2 95%   BMI 19.20 kg/m²     Physical Exam:   Performance Status:  General: AAO to person, place, time, in no acute distress, pleasant   Head and neck : PERRLA, EOMI . Sclera non icteric.   Oropharynx : Clear  Neck: no JVD,  no adenopathy  LYMPHATICS : No LAD  Heart: Regular rate and regular rhythm, no murmur  Lungs: Clear to auscultation   Extremities: No edema,no cyanosis, no clubbing. Abdomen: Soft, non-tender;no masses, no organomegaly  Skin:  No rash  Neurologic:Cranial nerves grossly intact. No focal motor or sensory deficits    Recent Laboratory Data-   Lab Results   Component Value Date    WBC 7.9 10/03/2020    HGB 7.8 (L) 10/03/2020    HCT 27.5 (L) 10/03/2020    MCV 71.4 (L) 10/03/2020     10/03/2020    LYMPHOPCT 12.0 (L) 10/03/2020    RBC 3.85 10/03/2020    MCH 20.3 (L) 10/03/2020    MCHC 28.4 (L) 10/03/2020    RDW 19.9 (H) 10/03/2020    NEUTOPHILPCT 82.0 (H) 10/03/2020    MONOPCT 5.0 10/03/2020    BASOPCT 0.0 10/03/2020    NEUTROABS 6.56 10/03/2020    LYMPHSABS 0.95 (L) 10/03/2020    MONOSABS 0.40 10/03/2020    EOSABS 0.00 (L) 10/03/2020    BASOSABS 0.00 10/03/2020       Lab Results   Component Value Date     10/03/2020    K 5.4 (H) 10/03/2020     10/03/2020    CO2 23 10/03/2020    BUN 17 10/03/2020    CREATININE 1.8 (H) 10/03/2020    GLUCOSE 98 10/03/2020    CALCIUM 9.0 10/03/2020    PROT 5.7 (L) 11/10/2019    LABALBU 3.2 (L) 11/10/2019    BILITOT 0.2 11/10/2019    ALKPHOS 154 (H) 11/10/2019    AST 17 11/10/2019    ALT 12 11/10/2019    LABGLOM 27 10/03/2020    GFRAA 33 10/03/2020       Lab Results   Component Value Date    IRON 12 (L) 09/29/2020    TIBC 302 09/29/2020    FERRITIN 36 09/29/2020           Radiology-    NM Cardiac Stress Test Nuclear Imaging   Final Result      1. The myocardial perfusion imaging was normal.   2. Gated SPECT left ventricular ejection fraction was calculated to be   80% with normal wall motion. Thank you for sending your patient to this [facility]. Guillermo Hammond MD            XR CHEST PORTABLE   Final Result   No acute process.          FL SMALL BOWEL FOLLOW THROUGH ONLY    (Results Pending)         ASSESSMENT/PLAN :  66 yo female  CHF exacerbation  CKD III  Anemia of CKD    - Anemia is most likely related to her CKD, though with AZEEM contributing as well  - She denies any overt bleeding  - CEA normal  - She has been given IV Ferrlecit and this should continue for 5 doses. If she is discharged prior will evaluate for additional IV iron outpatient.  DC on oral iron if she can tolerate it  - Will give Aranesp x 1  - Check myeloma work up as well  - Trend CBC  - Transfuse with Hgb <7    10/3/20  - Hgb 7.8  - Continue IV iron  - Myeloma panel pending  - GI following      Electronically signed by Liana Adams MD on 10/3/2020 at 1:01 PM

## 2020-10-03 NOTE — PLAN OF CARE
will improve  Outcome: Met This Shift  Goal: Will show no signs and symptoms of electrolyte imbalance  Description: Will show no signs and symptoms of electrolyte imbalance  Outcome: Met This Shift     Problem: Health Behavior:  Goal: Ability to manage health-related needs will improve  Description: Ability to manage health-related needs will improve  Outcome: Met This Shift  Goal: Ability to seek appropriate health care will improve  Description: Ability to seek appropriate health care will improve  Outcome: Met This Shift     Problem: Nutritional:  Goal: Maintenance of adequate nutrition will improve  Description: Maintenance of adequate nutrition will improve  Outcome: Met This Shift     Problem: Physical Regulation:  Goal: Complications related to the disease process, condition or treatment will be avoided or minimized  Description: Complications related to the disease process, condition or treatment will be avoided or minimized  Outcome: Met This Shift

## 2020-10-03 NOTE — PLAN OF CARE
Problem: Falls - Risk of:  Goal: Will remain free from falls  Description: Will remain free from falls  Outcome: Met This Shift  Goal: Absence of physical injury  Description: Absence of physical injury  Outcome: Met This Shift     Problem: Cardiac:  Goal: Hemodynamic stability will improve  Description: Hemodynamic stability will improve  Outcome: Met This Shift

## 2020-10-04 LAB
ACANTHOCYTES: ABNORMAL
ANION GAP SERPL CALCULATED.3IONS-SCNC: 12 MMOL/L (ref 7–16)
ANISOCYTOSIS: ABNORMAL
BASOPHILS ABSOLUTE: 0 E9/L (ref 0–0.2)
BASOPHILS RELATIVE PERCENT: 1 % (ref 0–2)
BUN BLDV-MCNC: 17 MG/DL (ref 8–23)
CALCIUM SERPL-MCNC: 9.2 MG/DL (ref 8.6–10.2)
CHLORIDE BLD-SCNC: 103 MMOL/L (ref 98–107)
CO2: 24 MMOL/L (ref 22–29)
CREAT SERPL-MCNC: 1.8 MG/DL (ref 0.5–1)
EOSINOPHILS ABSOLUTE: 0.17 E9/L (ref 0.05–0.5)
EOSINOPHILS RELATIVE PERCENT: 1.8 % (ref 0–6)
GFR AFRICAN AMERICAN: 33
GFR NON-AFRICAN AMERICAN: 27 ML/MIN/1.73
GLUCOSE BLD-MCNC: 99 MG/DL (ref 74–99)
HCT VFR BLD CALC: 28 % (ref 34–48)
HEMOGLOBIN: 7.8 G/DL (ref 11.5–15.5)
HYPOCHROMIA: ABNORMAL
LYMPHOCYTES ABSOLUTE: 1.12 E9/L (ref 1.5–4)
LYMPHOCYTES RELATIVE PERCENT: 11.7 % (ref 20–42)
MAGNESIUM: 2.2 MG/DL (ref 1.6–2.6)
MCH RBC QN AUTO: 20.5 PG (ref 26–35)
MCHC RBC AUTO-ENTMCNC: 27.9 % (ref 32–34.5)
MCV RBC AUTO: 73.7 FL (ref 80–99.9)
MONOCYTES ABSOLUTE: 0.56 E9/L (ref 0.1–0.95)
MONOCYTES RELATIVE PERCENT: 6.3 % (ref 2–12)
NEUTROPHILS ABSOLUTE: 7.44 E9/L (ref 1.8–7.3)
NEUTROPHILS RELATIVE PERCENT: 80.2 % (ref 43–80)
OVALOCYTES: ABNORMAL
PDW BLD-RTO: 21 FL (ref 11.5–15)
PHOSPHORUS: 2.9 MG/DL (ref 2.5–4.5)
PLATELET # BLD: 382 E9/L (ref 130–450)
PMV BLD AUTO: 10.2 FL (ref 7–12)
POIKILOCYTES: ABNORMAL
POLYCHROMASIA: ABNORMAL
POTASSIUM SERPL-SCNC: 4 MMOL/L (ref 3.5–5)
RBC # BLD: 3.8 E12/L (ref 3.5–5.5)
SODIUM BLD-SCNC: 139 MMOL/L (ref 132–146)
WBC # BLD: 9.3 E9/L (ref 4.5–11.5)

## 2020-10-04 PROCEDURE — 6370000000 HC RX 637 (ALT 250 FOR IP): Performed by: INTERNAL MEDICINE

## 2020-10-04 PROCEDURE — 84100 ASSAY OF PHOSPHORUS: CPT

## 2020-10-04 PROCEDURE — 6370000000 HC RX 637 (ALT 250 FOR IP): Performed by: NURSE PRACTITIONER

## 2020-10-04 PROCEDURE — 6360000002 HC RX W HCPCS: Performed by: INTERNAL MEDICINE

## 2020-10-04 PROCEDURE — 85025 COMPLETE CBC W/AUTO DIFF WBC: CPT

## 2020-10-04 PROCEDURE — 83735 ASSAY OF MAGNESIUM: CPT

## 2020-10-04 PROCEDURE — 2580000003 HC RX 258: Performed by: NURSE PRACTITIONER

## 2020-10-04 PROCEDURE — 36415 COLL VENOUS BLD VENIPUNCTURE: CPT

## 2020-10-04 PROCEDURE — 2580000003 HC RX 258: Performed by: INTERNAL MEDICINE

## 2020-10-04 PROCEDURE — 1200000000 HC SEMI PRIVATE

## 2020-10-04 PROCEDURE — 6360000002 HC RX W HCPCS: Performed by: NURSE PRACTITIONER

## 2020-10-04 PROCEDURE — 80048 BASIC METABOLIC PNL TOTAL CA: CPT

## 2020-10-04 RX ORDER — HYDROCORTISONE 0.5 %
CREAM (GRAM) TOPICAL 3 TIMES DAILY PRN
Status: DISCONTINUED | OUTPATIENT
Start: 2020-10-04 | End: 2020-10-05 | Stop reason: HOSPADM

## 2020-10-04 RX ADMIN — METOPROLOL SUCCINATE 25 MG: 25 TABLET, EXTENDED RELEASE ORAL at 09:05

## 2020-10-04 RX ADMIN — ACETAMINOPHEN 650 MG: 325 TABLET, FILM COATED ORAL at 09:04

## 2020-10-04 RX ADMIN — Medication: at 23:45

## 2020-10-04 RX ADMIN — ASPIRIN 81 MG CHEWABLE TABLET 81 MG: 81 TABLET CHEWABLE at 09:04

## 2020-10-04 RX ADMIN — ATORVASTATIN CALCIUM 20 MG: 20 TABLET, FILM COATED ORAL at 09:05

## 2020-10-04 RX ADMIN — Medication 10 ML: at 09:05

## 2020-10-04 RX ADMIN — PANTOPRAZOLE SODIUM 40 MG: 40 TABLET, DELAYED RELEASE ORAL at 05:22

## 2020-10-04 RX ADMIN — ROPINIROLE 1 MG: 1 TABLET, FILM COATED ORAL at 20:48

## 2020-10-04 RX ADMIN — FERROUS SULFATE TAB 325 MG (65 MG ELEMENTAL FE) 325 MG: 325 (65 FE) TAB at 16:09

## 2020-10-04 RX ADMIN — SODIUM CHLORIDE 125 MG: 9 INJECTION, SOLUTION INTRAVENOUS at 09:04

## 2020-10-04 RX ADMIN — Medication 10 ML: at 20:49

## 2020-10-04 RX ADMIN — PYRIDOXINE HCL TAB 50 MG 50 MG: 50 TAB at 09:05

## 2020-10-04 RX ADMIN — LEVOTHYROXINE SODIUM 75 MCG: 75 TABLET ORAL at 05:22

## 2020-10-04 RX ADMIN — MIRTAZAPINE 30 MG: 15 TABLET, ORALLY DISINTEGRATING ORAL at 20:48

## 2020-10-04 RX ADMIN — FERROUS SULFATE TAB 325 MG (65 MG ELEMENTAL FE) 325 MG: 325 (65 FE) TAB at 09:05

## 2020-10-04 RX ADMIN — ACETAMINOPHEN 650 MG: 325 TABLET, FILM COATED ORAL at 18:50

## 2020-10-04 RX ADMIN — ENOXAPARIN SODIUM 30 MG: 40 INJECTION SUBCUTANEOUS at 16:09

## 2020-10-04 RX ADMIN — PROMETHAZINE HYDROCHLORIDE 12.5 MG: 25 TABLET ORAL at 00:56

## 2020-10-04 ASSESSMENT — PAIN SCALES - GENERAL
PAINLEVEL_OUTOF10: 6
PAINLEVEL_OUTOF10: 4

## 2020-10-04 ASSESSMENT — PAIN DESCRIPTION - LOCATION: LOCATION: HEAD

## 2020-10-04 ASSESSMENT — PAIN DESCRIPTION - PAIN TYPE: TYPE: ACUTE PAIN

## 2020-10-04 ASSESSMENT — PAIN DESCRIPTION - FREQUENCY: FREQUENCY: CONTINUOUS

## 2020-10-04 ASSESSMENT — PAIN DESCRIPTION - PROGRESSION
CLINICAL_PROGRESSION: GRADUALLY IMPROVING
CLINICAL_PROGRESSION: NOT CHANGED

## 2020-10-04 ASSESSMENT — PAIN DESCRIPTION - ORIENTATION: ORIENTATION: ANTERIOR

## 2020-10-04 ASSESSMENT — PAIN DESCRIPTION - DESCRIPTORS: DESCRIPTORS: ACHING;CONSTANT;HEADACHE

## 2020-10-04 ASSESSMENT — PAIN DESCRIPTION - ONSET: ONSET: ON-GOING

## 2020-10-04 NOTE — PLAN OF CARE
Problem: Pain:  Goal: Pain level will decrease  Description: Pain level will decrease  10/4/2020 0941 by Derek Clifton RN  Outcome: Met This Shift  10/4/2020 0400 by Alex Ferris RN  Outcome: Met This Shift  Goal: Control of acute pain  Description: Control of acute pain  10/4/2020 0941 by Derek Clifton RN  Outcome: Met This Shift  10/4/2020 0400 by Alex Ferris RN  Outcome: Met This Shift  Goal: Control of chronic pain  Description: Control of chronic pain  10/4/2020 0941 by Derek Clifton RN  Outcome: Met This Shift  10/4/2020 0400 by Alex Ferris RN  Outcome: Met This Shift     Problem: Falls - Risk of:  Goal: Will remain free from falls  Description: Will remain free from falls  10/4/2020 0941 by Derek Clifton RN  Outcome: Met This Shift  10/4/2020 0400 by Alex Ferris RN  Outcome: Met This Shift  Goal: Absence of physical injury  Description: Absence of physical injury  10/4/2020 0941 by Derek Clifton RN  Outcome: Met This Shift  10/4/2020 0400 by Alex Ferris RN  Outcome: Met This Shift     Problem:  Activity:  Goal: Capacity to carry out activities will improve  Description: Capacity to carry out activities will improve  10/4/2020 0941 by Derek Clifton RN  Outcome: Met This Shift  10/4/2020 0400 by Alex Ferris RN  Outcome: Met This Shift  Goal: Will verbalize the importance of balancing activity with adequate rest periods  Description: Will verbalize the importance of balancing activity with adequate rest periods  Outcome: Met This Shift     Problem: Cardiac:  Goal: Hemodynamic stability will improve  Description: Hemodynamic stability will improve  10/4/2020 0941 by Derek Clifton RN  Outcome: Met This Shift  10/4/2020 0400 by Alex Ferris RN  Outcome: Met This Shift  Goal: Ability to maintain an adequate cardiac output will improve  Description: Ability to maintain an adequate cardiac output will improve  10/4/2020 0941 by Veronica Abrams Ayan Hancock RN  Outcome: Met This Shift  10/4/2020 0400 by Bryant Ayers RN  Outcome: Met This Shift     Problem: Coping:  Goal: Verbalizations of decreased anxiety will decrease  Description: Verbalizations of decreased anxiety will decrease  Outcome: Met This Shift     Problem: Fluid Volume:  Goal: Risk for excess fluid volume will decrease  Description: Risk for excess fluid volume will decrease  Outcome: Met This Shift  Goal: Maintenance of adequate hydration will improve  Description: Maintenance of adequate hydration will improve  Outcome: Met This Shift  Goal: Will show no signs and symptoms of electrolyte imbalance  Description: Will show no signs and symptoms of electrolyte imbalance  10/4/2020 0941 by Julita Geronimo RN  Outcome: Met This Shift  10/4/2020 0400 by Bryant Ayers RN  Outcome: Met This Shift     Problem: Health Behavior:  Goal: Ability to manage health-related needs will improve  Description: Ability to manage health-related needs will improve  10/4/2020 0941 by Julita Geronimo RN  Outcome: Met This Shift  10/4/2020 0400 by Bryant Ayers RN  Outcome: Met This Shift  Goal: Ability to seek appropriate health care will improve  Description: Ability to seek appropriate health care will improve  10/4/2020 0941 by Julita Geronimo RN  Outcome: Met This Shift  10/4/2020 0400 by Bryant Ayers RN  Outcome: Met This Shift     Problem: Nutritional:  Goal: Maintenance of adequate nutrition will improve  Description: Maintenance of adequate nutrition will improve  10/4/2020 0941 by Julita Geronimo RN  Outcome: Met This Shift  10/4/2020 0400 by Bryant Ayers RN  Outcome: Met This Shift     Problem: Physical Regulation:  Goal: Complications related to the disease process, condition or treatment will be avoided or minimized  Description: Complications related to the disease process, condition or treatment will be avoided or minimized  Outcome: Met This Shift     Problem: Respiratory:  Goal: Ability to maintain adequate ventilation will improve  Description: Ability to maintain adequate ventilation will improve  10/4/2020 0941 by Maryan Sandifer, RN  Outcome: Met This Shift  10/4/2020 0400 by Carole Graham RN  Outcome: Met This Shift  Goal: Respiratory status will improve  Description: Respiratory status will improve  10/4/2020 0941 by Maryan Sandifer, RN  Outcome: Met This Shift  10/4/2020 0400 by Carole Graham RN  Outcome: Met This Shift

## 2020-10-04 NOTE — PROGRESS NOTES
Internal Medicine Progress Note    [facility]    Kevin Hamilton. Daniel Frausto., F.A.CAneudyOAneudyI. Tracie Jason D.O., SJOJOEY Ruth D.O. Joshua Naqvi, MSN, APRN, NP-C  Rande Blizzard. Sarita White, MSN, APRN-CNP     Primary Care Physician: Cheryle Kaiser, MD   Admitting Physician:  Quincy Lefort, DO  Admission date and time: 9/29/2020 12:16 PM    Room:  [address] [place] [address]  Admitting diagnosis: Acute on chronic diastolic (congestive) heart failure (HCC) [I50.33]  CHF (congestive heart failure), NYHA class I, acute on chronic, combined [facility]) [I50.43]    Patient Name: Froy Bright  MRN: [id number]    Date of Service: 10/4/2020     Subjective:  Rangel Champagne is a 67 y.o. female who was seen and examined today,10/4/2020, at the bedside. The patient states that she does not feel as well as she did yesterday. Admits to fatigue. Did not sleep well. States he feels very weak and deconditioned today. Patient has complaint of headache. No photo or phonophobia. Alterative IV iron infusions. Review of System:   Constitutional:   Complains of weakness and fatigue. HEENT:   Denies ear pain, sore throat, sinus or eye problems. Cardiovascular:   Denies any chest pain, irregular heartbeats, or palpitations. Respiratory:   Currently denies shortness of breath at rest.  Does admit to mild shortness of breath with exertion. States that this is improving. Gastrointestinal:   Denies nausea, vomiting, diarrhea, or constipation. Denies any abdominal pain. Genitourinary:    Denies any urgency, frequency, hematuria. Voiding  without difficulty. Extremities:   Denies lower extremity swelling, edema or cyanosis. Neurology:    Admits to headache. Denies focal neurological deficits, admits to generalized weakness and deconditioning  Psch:   Denies being anxious or depressed. Musculoskeletal:    Denies  myalgias, joint complaints or back pain.    Integumentary:   Denies any rashes, ulcers, or excoriations. Denies bruising. Hematologic/Lymphatic:  Denies bruising or bleeding. Physical Exam:  No intake/output data recorded. Intake/Output Summary (Last 24 hours) at 10/4/2020 1413  Last data filed at 10/4/2020 0523  Gross per 24 hour   Intake 120 ml   Output 100 ml   Net 20 ml   I/O last 3 completed shifts: In: 120 [P.O.:120]  Out: 100 [Urine:100]  Patient Vitals for the past 96 hrs (Last 3 readings):   Weight   10/04/20 0559 103 lb 12.8 oz (47.1 kg)   10/04/20 0515 103 lb 12.8 oz (47.1 kg)   10/03/20 0545 105 lb (47.6 kg)     Vital Signs:   Blood pressure (!) 123/58, pulse 89, temperature 98.4 °F (36.9 °C), temperature source Oral, resp. rate 20, height 5' 2\" (1.575 m), weight 103 lb 12.8 oz (47.1 kg), SpO2 93 %. General appearance:  Alert, responsive, oriented to person, place, and time. Well preserved, alert, no distress. Head:  Normocephalic. No masses, lesions or tenderness. Eyes:  PERRLA. EOMI. Sclera clear. ENT:  Ears normal. Mucosa normal.  Neck:    Supple. Trachea midline. No thyromegaly. No JVD. No bruits. Heart:    Rhythm regular. Rate controlled. Mild systolic murmur. Lungs:    Symmetrical. Clear to auscultation bilaterally. No wheezes. No rhonchi. No rales. Abdomen:   Soft. Non-tender. Non-distended. Bowel sounds positive. No organomegaly or masses. No pain on palpation. Extremities:    Peripheral pulses present. No peripheral edema. No ulcers. No cyanosis. No clubbing. Neurologic:    Alert x 3. No focal deficit. Cranial nerves grossly intact. No focal weakness. Psych:   Behavior is normal. Mood appears normal. Speech is not rapid and/or pressured. Musculoskeletal:   Spine ROM normal. Muscular strength intact. Gait not assessed. Integumentary:  No rashes  Skin normal color and texture.   Genitalia/Breast:  Deferred    Medication:  Scheduled Meds:   vitamin B-6  50 mg Oral Daily    metoprolol succinate  25 mg Oral Daily    ferric gluconate (FERRLECIT) IVPB 125 mg Intravenous Daily    ferrous sulfate  325 mg Oral BID WC    levothyroxine  75 mcg Oral Daily    mirtazapine  30 mg Oral Nightly    pantoprazole  40 mg Oral QAM AC    rOPINIRole  1 mg Oral Nightly    atorvastatin  20 mg Oral Daily    sodium chloride flush  10 mL Intravenous 2 times per day    aspirin  81 mg Oral Daily    enoxaparin  30 mg Subcutaneous Daily     Continuous Infusions:    Objective Data:  CBC with Differential:    Lab Results   Component Value Date    WBC 9.3 10/04/2020    RBC 3.80 10/04/2020    HGB 7.8 10/04/2020    HCT 28.0 10/04/2020     10/04/2020    MCV 73.7 10/04/2020    MCH 20.5 10/04/2020    MCHC 27.9 10/04/2020    RDW 21.0 10/04/2020    NRBC 1.0 10/01/2020    SEGSPCT 62 02/09/2013    LYMPHOPCT 11.7 10/04/2020    MONOPCT 6.3 10/04/2020    MYELOPCT 1.0 10/03/2020    BASOPCT 1.0 10/04/2020    MONOSABS 0.56 10/04/2020    LYMPHSABS 1.12 10/04/2020    EOSABS 0.17 10/04/2020    BASOSABS 0.00 10/04/2020     BMP:    Lab Results   Component Value Date     10/04/2020    K 4.0 10/04/2020     10/04/2020    CO2 24 10/04/2020    BUN 17 10/04/2020    LABALBU 3.2 11/10/2019    LABALBU 3.5 06/09/2011    CREATININE 1.8 10/04/2020    CALCIUM 9.2 10/04/2020    GFRAA 33 10/04/2020    LABGLOM 27 10/04/2020    GLUCOSE 99 10/04/2020    GLUCOSE 167 06/10/2011       Assessment:    · Acute on chronic diastolic congestive heart failure--with hyperdynamic function with EF 80%  · Acute on chronic kidney disease stage III  · Microcytic anemia of chronic disease  · Headache  · Hyperlipidemia   · Hypothyroidism   · Gastroesophageal reflux disease with hiatal hernia  · Moderate protein calorie malnutrition    Plan:     · Patient has been seen by gastroenterology and plan for outpatient follow-up with probable capsule endoscopy  · Patient has been seen by hematology and is current receiving IV iron therapy-tolerating well.   Discussed the need for continued outpatient follow-up on discharge with the patient. Patient verbalized understanding. · Beta-blocker has been added due to hyperdynamic function-rate improved. · Increase activity and observe rhythm  · Did undergo cardiac stress test yesterday. Results revealed normal myocardial perfusion, LVEF was 80%, there was satisfactory. · Have encouraged the patient to increase activity as tolerated. ·  for discharge planning. Anticipate discharge home tomorrow    Services for discharge planning. She is deferring home health care upon discharge. More than 50% of my  time was spent at the bedside counseling/coordinating care with the patient and/or family with face to face contact. This time was spent reviewing notes and laboratory data as well as instructing and counseling the patient. Time I spent with the family or surrogate(s) is included only if the patient was incapable of providing the necessary information or participating in medical decisions. I also discussed the differential diagnosis and all of the proposed management plans with the patient and individuals accompanying the patient. [place] requires this high level of physician care and nursing on the Telemetry unit due the complexity of decision management and chance of rapid decline or death. Continued cardiac monitoring and higher level of nursing are required. I am readily available for any further decision-making and intervention.      Renate Roe DO, SOY.A.C.O.I.  10/4/2020  2:13 PM

## 2020-10-05 VITALS
BODY MASS INDEX: 19.43 KG/M2 | WEIGHT: 105.6 LBS | OXYGEN SATURATION: 94 % | DIASTOLIC BLOOD PRESSURE: 59 MMHG | HEIGHT: 62 IN | TEMPERATURE: 98.2 F | HEART RATE: 81 BPM | SYSTOLIC BLOOD PRESSURE: 114 MMHG | RESPIRATION RATE: 18 BRPM

## 2020-10-05 LAB
ALBUMIN SERPL-MCNC: 3.2 G/DL (ref 3.5–4.7)
ALPHA-1-GLOBULIN: 0.3 G/DL (ref 0.2–0.4)
ALPHA-2-GLOBULIN: 0.8 G/FL (ref 0.5–1)
ANION GAP SERPL CALCULATED.3IONS-SCNC: 11 MMOL/L (ref 7–16)
BASOPHILS ABSOLUTE: 0.09 E9/L (ref 0–0.2)
BASOPHILS RELATIVE PERCENT: 1 % (ref 0–2)
BETA GLOBULIN: 0.8 G/DL (ref 0.8–1.3)
BUN BLDV-MCNC: 13 MG/DL (ref 8–23)
CALCIUM SERPL-MCNC: 9.3 MG/DL (ref 8.6–10.2)
CHLORIDE BLD-SCNC: 103 MMOL/L (ref 98–107)
CO2: 26 MMOL/L (ref 22–29)
CREAT SERPL-MCNC: 1.8 MG/DL (ref 0.5–1)
EKG ATRIAL RATE: 135 BPM
EKG ATRIAL RATE: 73 BPM
EKG P AXIS: -27 DEGREES
EKG P AXIS: 35 DEGREES
EKG P-R INTERVAL: 122 MS
EKG P-R INTERVAL: 152 MS
EKG Q-T INTERVAL: 330 MS
EKG Q-T INTERVAL: 386 MS
EKG QRS DURATION: 58 MS
EKG QRS DURATION: 66 MS
EKG QTC CALCULATION (BAZETT): 425 MS
EKG QTC CALCULATION (BAZETT): 488 MS
EKG R AXIS: -31 DEGREES
EKG R AXIS: 1 DEGREES
EKG T AXIS: 32 DEGREES
EKG T AXIS: 48 DEGREES
EKG VENTRICULAR RATE: 132 BPM
EKG VENTRICULAR RATE: 73 BPM
ELECTROPHORESIS: ABNORMAL
EOSINOPHILS ABSOLUTE: 0.36 E9/L (ref 0.05–0.5)
EOSINOPHILS RELATIVE PERCENT: 4 % (ref 0–6)
GAMMA GLOBULIN: 1 G/DL (ref 0.7–1.6)
GFR AFRICAN AMERICAN: 33
GFR NON-AFRICAN AMERICAN: 27 ML/MIN/1.73
GLUCOSE BLD-MCNC: 102 MG/DL (ref 74–99)
HCT VFR BLD CALC: 29.1 % (ref 34–48)
HEMOGLOBIN: 8.2 G/DL (ref 11.5–15.5)
HYPOCHROMIA: ABNORMAL
IGA: 229 MG/DL (ref 70–400)
IGG: 902 MG/DL (ref 700–1600)
IGM: 138 MG/DL (ref 40–230)
IMMUNOFIXATION RESULT, SERUM: NORMAL
LYMPHOCYTES ABSOLUTE: 1.64 E9/L (ref 1.5–4)
LYMPHOCYTES RELATIVE PERCENT: 18 % (ref 20–42)
MCH RBC QN AUTO: 21 PG (ref 26–35)
MCHC RBC AUTO-ENTMCNC: 28.2 % (ref 32–34.5)
MCV RBC AUTO: 74.4 FL (ref 80–99.9)
MONOCYTES ABSOLUTE: 0.55 E9/L (ref 0.1–0.95)
MONOCYTES RELATIVE PERCENT: 6 % (ref 2–12)
MYELOCYTE PERCENT: 1 % (ref 0–0)
NEUTROPHILS ABSOLUTE: 6.46 E9/L (ref 1.8–7.3)
NEUTROPHILS RELATIVE PERCENT: 70 % (ref 43–80)
NUCLEATED RED BLOOD CELLS: 1 /100 WBC
PDW BLD-RTO: 21.3 FL (ref 11.5–15)
PLATELET # BLD: 454 E9/L (ref 130–450)
PMV BLD AUTO: 10.4 FL (ref 7–12)
POLYCHROMASIA: ABNORMAL
POTASSIUM SERPL-SCNC: 4.5 MMOL/L (ref 3.5–5)
RBC # BLD: 3.91 E12/L (ref 3.5–5.5)
SODIUM BLD-SCNC: 140 MMOL/L (ref 132–146)
TOTAL PROTEIN: 6.2 G/DL (ref 6.4–8.3)
WBC # BLD: 9.1 E9/L (ref 4.5–11.5)

## 2020-10-05 PROCEDURE — 6370000000 HC RX 637 (ALT 250 FOR IP): Performed by: INTERNAL MEDICINE

## 2020-10-05 PROCEDURE — 6370000000 HC RX 637 (ALT 250 FOR IP): Performed by: NURSE PRACTITIONER

## 2020-10-05 PROCEDURE — 85025 COMPLETE CBC W/AUTO DIFF WBC: CPT

## 2020-10-05 PROCEDURE — 36415 COLL VENOUS BLD VENIPUNCTURE: CPT

## 2020-10-05 PROCEDURE — 2580000003 HC RX 258: Performed by: INTERNAL MEDICINE

## 2020-10-05 PROCEDURE — 80048 BASIC METABOLIC PNL TOTAL CA: CPT

## 2020-10-05 PROCEDURE — 6360000002 HC RX W HCPCS: Performed by: INTERNAL MEDICINE

## 2020-10-05 PROCEDURE — 2580000003 HC RX 258: Performed by: NURSE PRACTITIONER

## 2020-10-05 RX ORDER — ROPINIROLE 1 MG/1
1 TABLET, FILM COATED ORAL NIGHTLY
Qty: 90 TABLET | Refills: 3 | Status: SHIPPED | OUTPATIENT
Start: 2020-10-05

## 2020-10-05 RX ORDER — FERROUS SULFATE 325(65) MG
325 TABLET ORAL 2 TIMES DAILY WITH MEALS
Qty: 30 TABLET | Refills: 3 | Status: SHIPPED | OUTPATIENT
Start: 2020-10-05

## 2020-10-05 RX ORDER — ASPIRIN 81 MG/1
81 TABLET, CHEWABLE ORAL DAILY
Qty: 30 TABLET | Refills: 3 | Status: SHIPPED | OUTPATIENT
Start: 2020-10-06

## 2020-10-05 RX ORDER — METOPROLOL SUCCINATE 25 MG/1
25 TABLET, EXTENDED RELEASE ORAL DAILY
Qty: 30 TABLET | Refills: 3 | Status: SHIPPED | OUTPATIENT
Start: 2020-10-06

## 2020-10-05 RX ORDER — PYRIDOXINE HCL (VITAMIN B6) 50 MG
50 TABLET ORAL DAILY
Qty: 30 TABLET | Refills: 3 | Status: SHIPPED | OUTPATIENT
Start: 2020-10-06

## 2020-10-05 RX ORDER — ATORVASTATIN CALCIUM 20 MG/1
20 TABLET, FILM COATED ORAL DAILY
Qty: 30 TABLET | Refills: 3 | Status: SHIPPED | OUTPATIENT
Start: 2020-10-06

## 2020-10-05 RX ADMIN — FERROUS SULFATE TAB 325 MG (65 MG ELEMENTAL FE) 325 MG: 325 (65 FE) TAB at 08:17

## 2020-10-05 RX ADMIN — METOPROLOL SUCCINATE 25 MG: 25 TABLET, EXTENDED RELEASE ORAL at 08:16

## 2020-10-05 RX ADMIN — PANTOPRAZOLE SODIUM 40 MG: 40 TABLET, DELAYED RELEASE ORAL at 05:42

## 2020-10-05 RX ADMIN — SODIUM CHLORIDE 125 MG: 9 INJECTION, SOLUTION INTRAVENOUS at 08:55

## 2020-10-05 RX ADMIN — LEVOTHYROXINE SODIUM 75 MCG: 75 TABLET ORAL at 05:42

## 2020-10-05 RX ADMIN — PYRIDOXINE HCL TAB 50 MG 50 MG: 50 TAB at 08:16

## 2020-10-05 RX ADMIN — Medication 10 ML: at 08:18

## 2020-10-05 RX ADMIN — ASPIRIN 81 MG CHEWABLE TABLET 81 MG: 81 TABLET CHEWABLE at 08:17

## 2020-10-05 RX ADMIN — ATORVASTATIN CALCIUM 20 MG: 20 TABLET, FILM COATED ORAL at 08:16

## 2020-10-05 ASSESSMENT — PAIN SCALES - GENERAL: PAINLEVEL_OUTOF10: 0

## 2020-10-05 ASSESSMENT — PAIN DESCRIPTION - PROGRESSION: CLINICAL_PROGRESSION: NOT CHANGED

## 2020-10-05 NOTE — PROGRESS NOTES
Blood and [address] [place] [address]  Hematology/Oncology      Patient Name: Richard Jerez  YOB: 1945  PCP: Jose D Weston MD   Referring Provider:      Reason for Consultation:   Chief Complaint   Patient presents with    Shortness of Breath     x 1 month         Subjective:  Feeling better and anxious to go home. History of Present Illness: This pt is a very pleasant 68 yo female who presented to the ER with progressive REYNAGA over the span of a few weeks on 9/29. She reports this improves with rest. CXR was negative for acute process. CBC on admission showed normal WBC and platelets, but a microcytic anemia with Hgb 8.3 and MCV 73. Iron profile showed ferritin of 36, but serum iron was only 12 and %sat was 4. Hgb today has down trended to 7.2 and MCV 71.9. Folate and B12 normal. ESR was 10. CMP showed CKD. She was diagnosed with CHF and was treated with IV diuretics with improvement in her symptoms (TTE showed LVEF of 81% and RVSP of 71 with severe pulmonary HTN).  Hematology asked to evaluate her anemia    Diagnostic Data:     Past Medical History:   Diagnosis Date    Acute on chronic diastolic (congestive) heart failure (HCC) 9/29/2020    Arthritis     GERD (gastroesophageal reflux disease)     H/O cardiovascular stress test 10/03/2020    Lexiscan    History of blood transfusion     Hyperlipidemia     Migraines     Thyroid disease        Patient Active Problem List    Diagnosis Date Noted    Precordial pain     SOB (shortness of breath)     CHF (congestive heart failure), NYHA class I, acute on chronic, combined ([address] 10/01/2020    Acute on chronic diastolic (congestive) heart failure ([address] 09/29/2020    Acute renal failure (ARF) ([address] 11/09/2019    [place] (acute kidney injury) ([address] 11/09/2019    Transient cerebral ischemia 07/02/2013    Arthritis, hip 02/07/2013    Avascular necrosis of bone of hip ([address] 02/07/2013    HLD (hyperlipidemia) 02/07/2013    Migraine 02/07/2013    PUD (peptic ulcer disease) 02/07/2013    Hypothyroid 02/07/2013        Past Surgical History:   Procedure Laterality Date    COLONOSCOPY      ENDOSCOPY, COLON, DIAGNOSTIC      FRACTURE SURGERY      left wrist, 2010-fx left hip    HYSTERECTOMY      JOINT REPLACEMENT      bilat knees    OTHER SURGICAL HISTORY Left 2-7-13    removal of hardware left hip left hip arthroplasty       Family History  History reviewed. No pertinent family history. Social History    TOBACCO:   reports that she has never smoked. She has never used smokeless tobacco.  ETOH:   reports no history of alcohol use. Home Medications  Prior to Admission medications    Medication Sig Start Date End Date Taking? Authorizing Provider   aspirin 81 MG chewable tablet Take 1 tablet by mouth daily 10/6/20  Yes Binh Martini DO   ferrous sulfate (IRON 325) 325 (65 Fe) MG tablet Take 1 tablet by mouth 2 times daily (with meals) 10/5/20  Yes Binh Martini DO   metoprolol succinate (TOPROL XL) 25 MG extended release tablet Take 1 tablet by mouth daily 10/6/20  Yes Binh Martini DO   rOPINIRole (REQUIP) 1 MG tablet Take 1 tablet by mouth nightly 10/5/20  Yes Binh Martini DO   atorvastatin (LIPITOR) 20 MG tablet Take 1 tablet by mouth daily 10/6/20  Yes Binh Martini DO   vitamin B-6 (B-6) 50 MG tablet Take 1 tablet by mouth daily 10/6/20  Yes Binh Martini DO   butalbital-aspirin-caffeine-codeine (FIORINAL/CODEINE #3) -27-30 MG capsule Take 1 capsule by mouth every 4-6 hours as needed for Pain.    Yes Historical Provider, MD   levothyroxine (SYNTHROID) 75 MCG tablet Take 75 mcg by mouth Daily   Yes Historical Provider, MD   mirtazapine (REMERON) 30 MG tablet Take 30 mg by mouth nightly   Yes Historical Provider, MD   pantoprazole (PROTONIX) 40 MG tablet Take 40 mg by mouth daily   Yes Historical Provider, MD   sertraline (ZOLOFT) 50 MG tablet Take 50 mg by mouth daily   Yes Historical Provider, MD       Allergies  Allergies   Allergen Reactions    Penicillins Hives and Rash       Review of Systems: +SOB/REYNAGA, both improved from admission      Objective  BP (!) 114/59   Pulse 81   Temp 98.2 °F (36.8 °C) (Oral)   Resp 18   Ht 5' 2\" (1.575 m)   Wt 105 lb 9.6 oz (47.9 kg)   SpO2 94%   BMI 19.31 kg/m²     Physical Exam:   Performance Status:  General: AAO to person, place, time, in no acute distress, pleasant   Head and neck : PERRLA, EOMI . Sclera non icteric. Oropharynx : Clear  Neck: no JVD,  no adenopathy  LYMPHATICS : No LAD  Heart: Regular rate and regular rhythm, no murmur  Lungs: Clear to auscultation   Extremities: No edema,no cyanosis, no clubbing. Abdomen: Soft, non-tender;no masses, no organomegaly  Skin:  No rash  Neurologic:Cranial nerves grossly intact.  No focal motor or sensory deficits    Recent Laboratory Data-   Lab Results   Component Value Date    WBC 9.1 10/05/2020    HGB 8.2 (L) 10/05/2020    HCT 29.1 (L) 10/05/2020    MCV 74.4 (L) 10/05/2020     (H) 10/05/2020    LYMPHOPCT 18.0 (L) 10/05/2020    RBC 3.91 10/05/2020    MCH 21.0 (L) 10/05/2020    MCHC 28.2 (L) 10/05/2020    RDW 21.3 (H) 10/05/2020    NEUTOPHILPCT 70.0 10/05/2020    MONOPCT 6.0 10/05/2020    BASOPCT 1.0 10/05/2020    NEUTROABS 6.46 10/05/2020    LYMPHSABS 1.64 10/05/2020    MONOSABS 0.55 10/05/2020    EOSABS 0.36 10/05/2020    BASOSABS 0.09 10/05/2020       Lab Results   Component Value Date     10/05/2020    K 4.5 10/05/2020     10/05/2020    CO2 26 10/05/2020    BUN 13 10/05/2020    CREATININE 1.8 (H) 10/05/2020    GLUCOSE 102 (H) 10/05/2020    CALCIUM 9.3 10/05/2020    PROT 5.7 (L) 11/10/2019    LABALBU 3.2 (L) 11/10/2019    BILITOT 0.2 11/10/2019    ALKPHOS 154 (H) 11/10/2019    AST 17 11/10/2019    ALT 12 11/10/2019    LABGLOM 27 10/05/2020    GFRAA 33 10/05/2020       Lab Results   Component Value Date    IRON 12 (L) 09/29/2020    TIBC 302 09/29/2020    FERRITIN 36 09/29/2020           Radiology-    FL SMALL BOWEL FOLLOW THROUGH ONLY   Final Result   Unremarkable small bowel follow through series. NM Cardiac Stress Test Nuclear Imaging   Final Result      1. The myocardial perfusion imaging was normal.   2. Gated SPECT left ventricular ejection fraction was calculated to be   80% with normal wall motion. Thank you for sending your patient to this [facility]. Chyna Taylor MD            XR CHEST PORTABLE   Final Result   No acute process. ASSESSMENT/PLAN :  66 yo female  CHF exacerbation  CKD III  Anemia of CKD    - Anemia is most likely related to her CKD, though with AZEEM contributing as well  - She denies any overt bleeding  - CEA normal  - She has been given IV Ferrlecit and this should continue for 5 doses. If she is discharged prior will evaluate for additional IV iron outpatient. DC on oral iron if she can tolerate it  - Will give Aranesp x 1  - Check myeloma work up as well  - Trend CBC  - Transfuse with Hgb <7    10/3/20  - Hgb 7.8  - Continue IV iron  - Myeloma panel pending  - GI following    10/5/2020  Hemoglobin improved to 8.2  Iron studies consistent with iron deficiency  She will require additional parenteral IV iron and EPO supplements to be arranged for her as outpatient. She will follow with Dr. Padmini Celis [facility] after discharge.     Electronically signed by Julio Sanchez MD on 10/5/2020 at 11:52 AM

## 2020-10-05 NOTE — PLAN OF CARE
Problem: Pain:  Goal: Pain level will decrease  Description: Pain level will decrease  Outcome: Met This Shift  Goal: Control of acute pain  Description: Control of acute pain  Outcome: Met This Shift  Goal: Control of chronic pain  Description: Control of chronic pain  Outcome: Met This Shift     Problem: Falls - Risk of:  Goal: Will remain free from falls  Description: Will remain free from falls  Outcome: Met This Shift  Goal: Absence of physical injury  Description: Absence of physical injury  Outcome: Met This Shift     Problem:  Activity:  Goal: Capacity to carry out activities will improve  Description: Capacity to carry out activities will improve  Outcome: Met This Shift  Goal: Will verbalize the importance of balancing activity with adequate rest periods  Description: Will verbalize the importance of balancing activity with adequate rest periods  Outcome: Met This Shift     Problem: Cardiac:  Goal: Hemodynamic stability will improve  Description: Hemodynamic stability will improve  Outcome: Met This Shift  Goal: Ability to maintain an adequate cardiac output will improve  Description: Ability to maintain an adequate cardiac output will improve  Outcome: Met This Shift     Problem: Fluid Volume:  Goal: Risk for excess fluid volume will decrease  Description: Risk for excess fluid volume will decrease  Outcome: Met This Shift  Goal: Will show no signs and symptoms of electrolyte imbalance  Description: Will show no signs and symptoms of electrolyte imbalance  Outcome: Met This Shift     Problem: Health Behavior:  Goal: Ability to manage health-related needs will improve  Description: Ability to manage health-related needs will improve  Outcome: Met This Shift  Goal: Ability to seek appropriate health care will improve  Description: Ability to seek appropriate health care will improve  Outcome: Met This Shift     Problem: Nutritional:  Goal: Maintenance of adequate nutrition will improve  Description: Maintenance of adequate nutrition will improve  Outcome: Met This Shift     Problem: Respiratory:  Goal: Ability to maintain adequate ventilation will improve  Description: Ability to maintain adequate ventilation will improve  Outcome: Met This Shift  Goal: Respiratory status will improve  Description: Respiratory status will improve  Outcome: Met This Shift

## 2020-10-05 NOTE — DISCHARGE SUMMARY
Internal Medicine Progress Note     [facility]     Claudeen Canard. Jimbo Navarro., SOY.A.C.OAneudyI. Emeka Aparicio D.O., SJOJOEY Hurst D.O. Juliet Storm, MSN, APRN, NP-C  Floridalma Garvey. Gertrudis Miner, MSN, APRN-CNP       Internal Medicine  Discharge Summary    NAME: Venessa Iglesias  :  1945  MRN:  [id number]  PCP:Buzz Burden MD  ADMITTED: 2020      DISCHARGED: 10/5/20    ADMITTING PHYSICIAN: Fay Pugh DO    CONSULTANT(S):   IP CONSULT TO SOCIAL WORK  IP CONSULT TO HEART FAILURE NURSE/COORDINATOR  IP CONSULT TO ONCOLOGY  IP CONSULT TO GI  IP CONSULT TO DIETITIAN     ADMITTING DIAGNOSIS:   Acute on chronic diastolic (congestive) heart failure (HCC) [I50.33]  CHF (congestive heart failure), NYHA class I, acute on chronic, combined ([address] [I50.43]     DISCHARGE DIAGNOSES:   · Acutely decompensated diastolic congestive heart failure with hyperdynamic function and ejection fraction of 80%  · Acute on chronic kidney disease stage III  · Iron deficiency anemia with concomitant anemia of chronic disease  · Hyperlipidemia   · Hypothyroidism   · Gastroesophageal reflux disease with hiatal hernia  · Moderate protein calorie malnutrition    BRIEF HISTORY OF PRESENT ILLNESS:   Nimo Henning is a 67year old female patient who presented to the emergency department today with dyspnea on exertion. Symptoms have been ongoing for several days, actually weeks on further questioning with the patient. She admits that she has 11 stairs in her home that she has to walk with some regularity and this is become increasingly difficult to the point where she can no longer do this due to the degree of dyspnea. She does not believe she has gained any significant weight and admits to only mild lower extremity swelling. Dyspnea exertion is quite prohibitive however.   She does have some mild chest discomfort at the maximal exertion which does improve dramatically with rest.  She denies orthopnea. She has not changed her fluid or sodium intake significantly recently. She does admit to being somewhat depressed due to the recent loss of her sister. She is coughing with congestion but not expectorating sputum or hemoptysis. She denies fevers or chills, constitutional symptoms of illness. No known sick contacts or exposures. She is anemic at baseline but denies any overt gastrointestinal or urinary/vaginal bleeding. No abdominal pain, vomiting, bowel pattern change. No dysuria or urinary frequency. LABS[de-identified]  Lab Results   Component Value Date    WBC 9.1 10/05/2020    HGB 8.2 (L) 10/05/2020    HCT 29.1 (L) 10/05/2020     (H) 10/05/2020     10/05/2020    K 4.5 10/05/2020     10/05/2020    CREATININE 1.8 (H) 10/05/2020    BUN 13 10/05/2020    CO2 26 10/05/2020    GLUCOSE 102 (H) 10/05/2020    ALT 12 11/10/2019    AST 17 11/10/2019    INR 1.0 05/19/2013     Lab Results   Component Value Date    INR 1.0 05/19/2013    INR 0.9 02/01/2013    INR 1.0 06/08/2011    PROTIME 12.0 05/19/2013    PROTIME 11.5 02/01/2013    PROTIME 12.3 06/08/2011      Lab Results   Component Value Date    TSH 1.590 09/29/2020     Lab Results   Component Value Date    TRIG 126 09/30/2020    TRIG 195 (H) 06/09/2011     Lab Results   Component Value Date    HDL 83 09/30/2020    HDL 67.0 06/09/2011     Lab Results   Component Value Date    LDLCALC 94 09/30/2020    LDLCALC 97 06/09/2011     Lab Results   Component Value Date    LABA1C 6.1 (H) 09/30/2020       IMAGING:  Xr Chest Portable    Result Date: 9/29/2020  EXAMINATION: ONE XRAY VIEW OF THE CHEST 9/29/2020 1:16 pm COMPARISON: Chest radiograph November 9, 2019 HISTORY: ORDERING SYSTEM PROVIDED HISTORY: shortness of breath TECHNOLOGIST PROVIDED HISTORY: Reason for exam:->shortness of breath FINDINGS: The lungs are without acute focal process. There is no effusion or pneumothorax. The cardiomediastinal silhouette is without acute process.  The osseous structures are without acute process. Large hiatal hernia again identified. No acute process. Fl Small Bowel Follow Through Only    Result Date: 10/4/2020  EXAMINATION: SMALL BOWEL FOLLOW THROUGH SERIES 10/3/2020 TECHNIQUE: Small bowel follow through series was performed with overhead images and spot images. FLUOROSCOPY DOSE AND TYPE OR TIME AND EXPOSURES: None COMPARISON: None HISTORY: ORDERING SYSTEM PROVIDED HISTORY: anemia TECHNOLOGIST PROVIDED HISTORY: Reason for exam:->anemia FINDINGS:  image of the abdomen demonstrates a nonspecific, nonobstructed bowel gas pattern. There is normal transit time to the terminal ileum. No focal abnormalities, strictures or obstructions are seen of the small bowel. Spot images of the terminal ileum are unremarkable. Unremarkable small bowel follow through series. Nm Cardiac Stress Test Nuclear Imaging    Result Date: 10/3/2020  Indication: Shortness of breath Clinical History:   Patient has no known history of coronary artery disease. IMAGING: Myocardial perfusion imaging was performed at rest 30-35 minutes following the intravenous injection of 10.8 mCi of (Tc-Sestamibi) followed by 10 ml of Normal Saline. At peak exercise, the patient was injected intravenously with 30.3 mCi of (Tc-Sestamibi) followed by 10 ml of Normal Saline. Gated post-stress tomographic imaging was performed 20-25 minutes after stress. FINDINGS: The overall quality of the study was excellent. Left ventricular cavity size was noted to be normal. The gated SPECT stress imaging in the short, vertical long, and horizontal long axis demonstrated normal homogeneous tracer distribution throughout the myocardium. The resting images show no change. Gated SPECT left ventricular ejection fraction was calculated to be 80% with normal wall motion.      1. The myocardial perfusion imaging was normal. 2. Gated SPECT left ventricular ejection fraction was calculated to be 80% with normal wall motion. Thank you for sending your patient to this [facility]. Buck Heller MD         HOSPITAL COURSE:   The patient did well throughout the hospitalization. She was found to be suffering from mildly decompensated diastolic congestive heart failure. This was addressed accordingly with maximization of heart medications and reevaluation of echocardiogram.  She was also evaluated by the hematology and GI teams in the setting of chronic anemia. She was given IV iron in the setting of iron deficiency. She will be discharged home on oral iron. Otherwise, lab work and vital signs stabilized. She was deemed acceptable for discharge home. BRIEF PHYSICAL EXAMINATION AND LABORATORIES ON DAY OF DISCHARGE:  VITALS:  BP (!) 114/59   Pulse 81   Temp 98.2 °F (36.8 °C) (Oral)   Resp 18   Ht 5' 2\" (1.575 m)   Wt 105 lb 9.6 oz (47.9 kg)   SpO2 94%   BMI 19.31 kg/m²     HEENT:  PERRLA. EOMI. Sclera clear. Buccal mucosa moist.    Neck:  Supple. Trachea midline. No thyromegaly. No JVD. No bruits. Heart:  Rhythm regular, rate controlled. No murmurs. Lungs:  Symmetrical. Clear to auscultation bilaterally. No wheezes. No rhonchi. No rales. Abdomen: Soft. Non-tender. Non-distended. Bowel sounds positive. No organomegaly or masses. No pain on palpation    Extremities:  Peripheral pulses present. No peripheral edema. No ulcers. Neurologic:  Alert x 3. No focal deficit. Cranial nerves grossly intact. Skin:  No petechia. No hemorrhage. No wounds. DISPOSITION:  The patient's condition is good. At this time the patient is without objective evidence of an acute process requiring continuing hospitalization or inpatient management. They are stable for discharge with outpatient follow-up.     I have spoken with the patient and discussed the results of the current hospitalization, in addition to providing specific details for the plan of care and counseling regarding the diagnosis and prognosis. The plan has been discussed in detail and they are aware of the specific conditions for emergent return, as well as the importance of follow-up. Their questions are answered at this time and they are agreeable with the plan for discharge to home    DISCHARGE MEDICATIONS:    Verofie March   Home Medication Instructions [id number]    Printed on:10/05/20 2772   Medication Information                      aspirin 81 MG chewable tablet  Take 1 tablet by mouth daily             atorvastatin (LIPITOR) 20 MG tablet  Take 1 tablet by mouth daily             butalbital-aspirin-caffeine-codeine (FIORINAL/CODEINE #3) -98-30 MG capsule  Take 1 capsule by mouth every 4-6 hours as needed for Pain. ferrous sulfate (IRON 325) 325 (65 Fe) MG tablet  Take 1 tablet by mouth 2 times daily (with meals)             levothyroxine (SYNTHROID) 75 MCG tablet  Take 75 mcg by mouth Daily             metoprolol succinate (TOPROL XL) 25 MG extended release tablet  Take 1 tablet by mouth daily             mirtazapine (REMERON) 30 MG tablet  Take 30 mg by mouth nightly             pantoprazole (PROTONIX) 40 MG tablet  Take 40 mg by mouth daily             rOPINIRole (REQUIP) 1 MG tablet  Take 1 tablet by mouth nightly             sertraline (ZOLOFT) 50 MG tablet  Take 50 mg by mouth daily             vitamin B-6 (B-6) 50 MG tablet  Take 1 tablet by mouth daily                 FOLLOW UP/INSTRUCTIONS:  · This patient is instructed to follow-up with her primary care physician. · Patient is instructed to follow-up with the consults listed above as directed by them. · she is instructed to resume home medications and take new medications as indicated in the list above. · If the patient has a recurrence of symptoms, she is instructed to go to the ED. Preparing for this patient's discharge, including paperwork, orders, instructions, and meeting with patient did require > 40 minutes.     Charan Joyner DO Rafa     10/5/2020  9:25 AM

## 2020-10-06 LAB — BETA-2 MICROGLOBULIN: 3.8 MG/L (ref 0.6–2.4)

## 2020-10-08 LAB — VITAMIN B6: <5 NMOL/L (ref 20–125)

## 2020-10-08 NOTE — ADT AUTH CERT
Patient Demographics     Name  Patient ID  SSN  Gender Identity  Birth Date    Geoff Pearson  [id number]    Female  [birth year] (67 yrs)    Address  Phone  Email  Employer     [address] [facility] [id number] (W)   [phone] (M)  --  [address] [employer]  Race  Occupation  Emp Status     [employer]  White  --  Self Employed     Reg Status  PCP  Date Last Verified  Next Review Date     Verified  Staci Love MD  [phone]  09/29/20  10/29/20     Admission Date  Discharge Date  Admitting Provider      09/29/20  10/05/20  Robyn Lance DO      Marital Status  [employer]         [address]       Emergency Contact 1  Emergency Contact 2  Emergency Contact 3  Emergency Contact [address] [facility] (C)   [facility] [place] [address]   [phone] [facility])  [facility] [phone] [place] [address] (U)   [phone] ([address] [place] [address])  [facility]   [phone] Lissy Marquez    Subscriber Details   Hospital Account [de-identified]   CVG  Subscriber Name/Sex/Relation  Subscriber   Subscriber Address/Phone  Subscriber Emp/Emp Phone    1.  [facility] MEDICARE   [address] [facility] [address] [facility] C - Female   (Self)  1945  [address] [place], [place]  [address] [phone](C)  SELF-EMPLOYED    Utilization Reviews         gfr/ bun by Hillary Santizo RN         Review Status  Review Entered    In Primary  10/8/2020 12:51        Criteria Review        Bun/creat- 16/1.7  Gfr- 29           -   Bun/creat- 18/1.9  Gfr-26           10/  Bun/creat-20/2.1  Gfr-23        10/  Bun/creat-17/1.9  Gfr-26        10/3  Bun/creat- 17/1.8  Gfr-27           10/4  Bun/creat-17/1.8  Gfr-27        10/5  Bun/creat-13/1.8  Gfr-  27      Additional Notes    10-8-20          9-29      Bun/creat- 16/1.7    Gfr- 29                   9-30    Bun/creat- 18/1.9    Gfr-26                   10/1    Bun/creat-20/2.1    Gfr-23              10/2    Bun/creat-17/1.9    Gfr-26              10/3    Bun/creat- 17/1.8    Gfr-27                10/4    Bun/creat-17/1.8    Gfr-27              10/5    Bun/creat-13/1.8    Gfr-  63

## 2020-10-28 ENCOUNTER — TELEPHONE (OUTPATIENT)
Dept: OTHER | Age: 75
End: 2020-10-28

## 2020-10-28 NOTE — TELEPHONE ENCOUNTER
Patient called to cancel her appointment with the [facility] on 11/3/2020. She has opted to no longer follow with the clinic. I told the patient if she wishes to follow in the future, she is welcomed to call. She verbalized understanding.

## 2020-11-03 PROBLEM — N17.9 ACUTE RENAL FAILURE (ARF) (HCC): Status: RESOLVED | Noted: 2019-11-09 | Resolved: 2020-11-03

## 2020-11-13 ENCOUNTER — OFFICE VISIT (OUTPATIENT)
Dept: ONCOLOGY | Age: 75
End: 2020-11-13
Payer: MEDICARE

## 2020-11-13 ENCOUNTER — HOSPITAL ENCOUNTER (OUTPATIENT)
Dept: INFUSION THERAPY | Age: 75
Discharge: HOME | End: 2020-11-13
Payer: MEDICARE

## 2020-11-13 VITALS
DIASTOLIC BLOOD PRESSURE: 63 MMHG | WEIGHT: 105 LBS | OXYGEN SATURATION: 90 % | TEMPERATURE: 96.6 F | HEART RATE: 85 BPM | SYSTOLIC BLOOD PRESSURE: 105 MMHG | HEIGHT: 62 IN | BODY MASS INDEX: 19.32 KG/M2

## 2020-11-13 DIAGNOSIS — N18.31 ANEMIA DUE TO STAGE 3A CHRONIC KIDNEY DISEASE (HCC): ICD-10-CM

## 2020-11-13 DIAGNOSIS — D63.1 ANEMIA DUE TO STAGE 3A CHRONIC KIDNEY DISEASE (HCC): ICD-10-CM

## 2020-11-13 LAB
ALBUMIN SERPL-MCNC: 3.8 G/DL (ref 3.5–5.2)
ALP BLD-CCNC: 78 U/L (ref 35–104)
ALT SERPL-CCNC: 23 U/L (ref 0–32)
ANION GAP SERPL CALCULATED.3IONS-SCNC: 10 MMOL/L (ref 7–16)
ANISOCYTOSIS: ABNORMAL
AST SERPL-CCNC: 25 U/L (ref 0–31)
BASOPHILS ABSOLUTE: 0.06 E9/L (ref 0–0.2)
BASOPHILS RELATIVE PERCENT: 0.9 % (ref 0–2)
BILIRUB SERPL-MCNC: <0.2 MG/DL (ref 0–1.2)
BUN BLDV-MCNC: 24 MG/DL (ref 8–23)
BURR CELLS: ABNORMAL
CALCIUM SERPL-MCNC: 9 MG/DL (ref 8.6–10.2)
CHLORIDE BLD-SCNC: 105 MMOL/L (ref 98–107)
CO2: 25 MMOL/L (ref 22–29)
CREAT SERPL-MCNC: 1.5 MG/DL (ref 0.5–1)
EOSINOPHILS ABSOLUTE: 0.06 E9/L (ref 0.05–0.5)
EOSINOPHILS RELATIVE PERCENT: 0.9 % (ref 0–6)
FERRITIN: 72 NG/ML
GFR AFRICAN AMERICAN: 41
GFR NON-AFRICAN AMERICAN: 34 ML/MIN/1.73
GLUCOSE BLD-MCNC: 103 MG/DL (ref 74–99)
HCT VFR BLD CALC: 35.8 % (ref 34–48)
HEMOGLOBIN: 11.1 G/DL (ref 11.5–15.5)
HYPOCHROMIA: ABNORMAL
IRON SATURATION: 25 % (ref 15–50)
IRON: 43 MCG/DL (ref 37–145)
LYMPHOCYTES ABSOLUTE: 1.49 E9/L (ref 1.5–4)
LYMPHOCYTES RELATIVE PERCENT: 21.2 % (ref 20–42)
MCH RBC QN AUTO: 26.9 PG (ref 26–35)
MCHC RBC AUTO-ENTMCNC: 31 % (ref 32–34.5)
MCV RBC AUTO: 86.7 FL (ref 80–99.9)
MONOCYTES ABSOLUTE: 0.5 E9/L (ref 0.1–0.95)
MONOCYTES RELATIVE PERCENT: 7.1 % (ref 2–12)
NEUTROPHILS ABSOLUTE: 4.97 E9/L (ref 1.8–7.3)
NEUTROPHILS RELATIVE PERCENT: 69.9 % (ref 43–80)
OVALOCYTES: ABNORMAL
PDW BLD-RTO: ABNORMAL FL (ref 11.5–15)
PLATELET # BLD: 293 E9/L (ref 130–450)
PMV BLD AUTO: 9.9 FL (ref 7–12)
POIKILOCYTES: ABNORMAL
POLYCHROMASIA: ABNORMAL
POTASSIUM SERPL-SCNC: 4.9 MMOL/L (ref 3.5–5)
RBC # BLD: 4.13 E12/L (ref 3.5–5.5)
SODIUM BLD-SCNC: 140 MMOL/L (ref 132–146)
SPHEROCYTES: ABNORMAL
TARGET CELLS: ABNORMAL
TOTAL IRON BINDING CAPACITY: 171 MCG/DL (ref 250–450)
TOTAL PROTEIN: 6.4 G/DL (ref 6.4–8.3)
WBC # BLD: 7.1 E9/L (ref 4.5–11.5)

## 2020-11-13 PROCEDURE — 36415 COLL VENOUS BLD VENIPUNCTURE: CPT

## 2020-11-13 PROCEDURE — 80053 COMPREHEN METABOLIC PANEL: CPT

## 2020-11-13 PROCEDURE — 83550 IRON BINDING TEST: CPT

## 2020-11-13 PROCEDURE — 83540 ASSAY OF IRON: CPT

## 2020-11-13 PROCEDURE — 85025 COMPLETE CBC W/AUTO DIFF WBC: CPT

## 2020-11-13 PROCEDURE — 99214 OFFICE O/P EST MOD 30 MIN: CPT

## 2020-11-13 PROCEDURE — 82728 ASSAY OF FERRITIN: CPT

## 2020-11-13 RX ORDER — CALCIUM CARBONATE 200(500)MG
2 TABLET,CHEWABLE ORAL DAILY
COMMUNITY

## 2020-11-13 NOTE — PROGRESS NOTES
Melanie Torres  1945 67 y.o. Referring Physician: hospital follow up     PCP: Carmel Lagos MD    There were no vitals filed for this visit. Wt Readings from Last 3 Encounters:   20 105 lb (47.6 kg)   10/19/20 96 lb (43.5 kg)   10/05/20 105 lb 9.6 oz (47.9 kg)        There is no height or weight on file to calculate BMI. Chief Complaint: iron was low, denies ice cravings       Cancer Staging  No matching staging information was found for the patient. Prior Radiation Therapy? NO    Concurrent Chemo/radiation? NO    Prior Chemotherapy? NO    Prior Hormonal Therapy? NO    Head and Neck Cancer? No, patient does NOT have HN cancer.       LMP: hysterectomy in the [de-identified]     Age at first Menses: 14    : 5    Para: 4          Current Outpatient Medications:     aspirin 81 MG chewable tablet, Take 1 tablet by mouth daily, Disp: 30 tablet, Rfl: 3    ferrous sulfate (IRON 325) 325 (65 Fe) MG tablet, Take 1 tablet by mouth 2 times daily (with meals), Disp: 30 tablet, Rfl: 3    metoprolol succinate (TOPROL XL) 25 MG extended release tablet, Take 1 tablet by mouth daily, Disp: 30 tablet, Rfl: 3    rOPINIRole (REQUIP) 1 MG tablet, Take 1 tablet by mouth nightly, Disp: 90 tablet, Rfl: 3    atorvastatin (LIPITOR) 20 MG tablet, Take 1 tablet by mouth daily, Disp: 30 tablet, Rfl: 3    vitamin B-6 (B-6) 50 MG tablet, Take 1 tablet by mouth daily, Disp: 30 tablet, Rfl: 3    butalbital-aspirin-caffeine-codeine (FIORINAL/CODEINE #3) -00-30 MG capsule, Take 1 capsule by mouth every 4-6 hours as needed for Pain., Disp: , Rfl:     levothyroxine (SYNTHROID) 75 MCG tablet, Take 75 mcg by mouth Daily, Disp: , Rfl:     mirtazapine (REMERON) 30 MG tablet, Take 30 mg by mouth nightly, Disp: , Rfl:     pantoprazole (PROTONIX) 40 MG tablet, Take 40 mg by mouth daily, Disp: , Rfl:     sertraline (ZOLOFT) 50 MG tablet, Take 50 mg by mouth daily, Disp: , Rfl:        Past Medical History: Diagnosis Date    Acute on chronic diastolic (congestive) heart failure (HCC) 9/29/2020    Arthritis     GERD (gastroesophageal reflux disease)     H/O cardiovascular stress test 10/03/2020    Lexiscan    History of blood transfusion     Hyperlipidemia     Migraines     Thyroid disease        Past Surgical History:   Procedure Laterality Date    COLONOSCOPY      ENDOSCOPY, COLON, DIAGNOSTIC      FRACTURE SURGERY      left wrist, 2010-fx left hip    HYSTERECTOMY      JOINT REPLACEMENT      bilat knees    OTHER SURGICAL HISTORY Left 2-7-13    removal of hardware left hip left hip arthroplasty       No family history on file. Social History     Socioeconomic History    Marital status:       Spouse name: Not on file    Number of children: Not on file    Years of education: Not on file    Highest education level: Not on file   Occupational History    Not on file   Social Needs    Financial resource strain: Not on file    Food insecurity     Worry: Not on file     Inability: Not on file    Transportation needs     Medical: Not on file     Non-medical: Not on file   Tobacco Use    Smoking status: Never Smoker    Smokeless tobacco: Never Used   Substance and Sexual Activity    Alcohol use: No    Drug use: No    Sexual activity: Not on file   Lifestyle    Physical activity     Days per week: Not on file     Minutes per session: Not on file    Stress: Not on file   Relationships    Social connections     Talks on phone: Not on file     Gets together: Not on file     Attends Alevism service: Not on file     Active member of club or organization: Not on file     Attends meetings of clubs or organizations: Not on file     Relationship status: Not on file    Intimate partner violence     Fear of current or ex partner: Not on file     Emotionally abused: Not on file     Physically abused: Not on file     Forced sexual activity: Not on file   Other Topics Concern    Not on file   Social History Narrative    Not on file           Occupation: own business  Retired:  NO          REVIEW OF SYSTEMS:    Pacemaker/Defibulator/ICD:  No    Mediport: No           FALLS RISK SCREENING ASSESSMENT    Instructions:  Assess the patient and Kenaitze the appropriate indicators that are present for fall risk identification. Total the numbers circled and assign a fall risk score from Table 2.  Reassess patient at a minimum every 12 weeks or with status change. Assessment   Date  11/13/2020     1. Mental Ability: confusion/cognitively impaired No - 0       2. Elimination Issues: incontinence, frequency No - 0       3. Ambulatory: use of assistive devices (walker, cane, off-loading devices), attached to equipment (IV pole, oxygen) No - 0     4. Sensory Limitations: dizziness, vertigo, impaired vision No - 0       5. Age 72 years or greater - 1       10. Medication: diuretics, strong analgesics, hypnotics, sedatives, antihypertensive agents   No - 0   7. Falls:  recent history of falls within the last 3 months (not to include slipping or tripping)   Yes - 7   TOTAL 8    If score of 4 or greater was education given? Yes       TABLE 2   Risk Score Risk Level Plan of Care   0-3 Little or  No Risk 1. Provide assistance as indicated for ambulation activities  2. Reorient confused/cognitively impaired patient  3. Call-light/bell within patient's reach  4. Chair/bed in low position, stretcher/bed with siderails up except when performing patient care activities  5. Educate patient/family/caregiver on falls prevention  6.  Reassess in 12 weeks or with any noted change in patient condition which places them at a risk for a fall   4-6 Moderate Risk 1. Provide assistance as indicated for ambulation activities  2. Reorient confused/cognitively impaired patient  3. Call-light/bell within patient's reach  4. Chair/bed in low position, stretcher/bed with siderails up except when performing patient care activities  5. Educate patient/family/caregiver on falls prevention  6. Falls risk precaution (Yellow sticker Level II) placed on patient chart   7 or   Higher High Risk 1. Place patient in easily observable treatment room  2. Patient attended at all times by family member or staff  3. Provide assistance as indicated for ambulation activities  4. Reorient confused/cognitively impaired patient  5. Call-light/bell within patient's reach  6. Chair/bed in low position, stretcher/bed with siderails up except when performing patient care activities  7. Educate patient/family/caregiver on falls prevention  8. Falls risk precaution (Yellow sticker Level III) placed on patient chart           MALNUTRITION RISK SCREENING ASSESSMENT    Instructions:  Assess the patient and enter the appropriate indicators that are present for nutrition risk identification. Total the numbers entered and assign a risk score. Follow the appropriate action for total score listed below. Assessment   Date  11/13/2020     1. Have you lost weight without trying? 0- No     2. Have you been eating poorly because of a decreased appetite? 1- Yes   3. Do you have a diagnosis of head and neck cancer?       0- No                                                                                    TOTAL 1        Score of 0-1: No action  Score 2 or greater:  · For Non-Diabetic Patient: Recommend adding Ensure Enlive 2 x daily and provide patient with Ensure wellness bag with coupons  · For Diabetic Patient: Recommend adding Glucerna Shake 2 x daily and provide patient with Glucerna Wellness bag with coupons  · Route to the dietitian via [employer]

## 2020-11-13 NOTE — PROGRESS NOTES
[facility]      Patient Name: Litzy Alan  YOB: 1945  PCP: Sandra Currie MD   Referring Provider:      Reason for Consultation:   Chief Complaint   Patient presents with    New Patient        Subjective:  Dneies any bleeding, mild constipation with oral iron. Feels improved since discharge    History of Present Illness: This pt is a very pleasant 68 yo female who presented to the ER with progressive REYNAGA over the span of a few weeks on 9/29. She reports this improves with rest. CXR was negative for acute process. CBC on admission showed normal WBC and platelets, but a microcytic anemia with Hgb 8.3 and MCV 73. Iron profile showed ferritin of 36, but serum iron was only 12 and %sat was 4. Hgb today has down trended to 7.2 and MCV 71.9. Folate and B12 normal. ESR was 10. CMP showed CKD. She was diagnosed with CHF and was treated with IV diuretics with improvement in her symptoms (TTE showed LVEF of 81% and RVSP of 71 with severe pulmonary HTN).  Hematology asked to evaluate her anemia    Diagnostic Data:     Past Medical History:   Diagnosis Date    Acute on chronic diastolic (congestive) heart failure (HCC) 9/29/2020    Arthritis     GERD (gastroesophageal reflux disease)     H/O cardiovascular stress test 10/03/2020    Lexiscan    History of blood transfusion     Hyperlipidemia     Migraines     Thyroid disease        Patient Active Problem List    Diagnosis Date Noted    Precordial pain     SOB (shortness of breath)     CHF (congestive heart failure), NYHA class I, acute on chronic, combined ([address] 10/01/2020    Acute on chronic diastolic (congestive) heart failure ([address] 09/29/2020    [place] (acute kidney injury) ([address] 11/09/2019    Transient cerebral ischemia 07/02/2013    Arthritis, hip 02/07/2013    Avascular necrosis of bone of hip ([address] 02/07/2013    HLD (hyperlipidemia) 02/07/2013    Migraine 02/07/2013    PUD (peptic ulcer disease) 02/07/2013    Hypothyroid 02/07/2013        Past Surgical History:   Procedure Laterality Date    COLONOSCOPY      ENDOSCOPY, COLON, DIAGNOSTIC      FRACTURE SURGERY      left wrist, 2010-fx left hip    HYSTERECTOMY      JOINT REPLACEMENT      bilat knees    OTHER SURGICAL HISTORY Left 2-7-13    removal of hardware left hip left hip arthroplasty       Family History  No family history on file. Social History    TOBACCO:   reports that she has never smoked. She has never used smokeless tobacco.  ETOH:   reports no history of alcohol use. Home Medications  Prior to Admission medications    Medication Sig Start Date End Date Taking? Authorizing Provider   aspirin 81 MG chewable tablet Take 1 tablet by mouth daily 10/6/20   Felicie Bellow, DO   ferrous sulfate (IRON 325) 325 (65 Fe) MG tablet Take 1 tablet by mouth 2 times daily (with meals) 10/5/20   Felicie Bellow, DO   metoprolol succinate (TOPROL XL) 25 MG extended release tablet Take 1 tablet by mouth daily 10/6/20   Felicie Bellow, DO   rOPINIRole (REQUIP) 1 MG tablet Take 1 tablet by mouth nightly 10/5/20   Felicie Bellow, DO   atorvastatin (LIPITOR) 20 MG tablet Take 1 tablet by mouth daily 10/6/20   Felicie Bellow, DO   vitamin B-6 (B-6) 50 MG tablet Take 1 tablet by mouth daily 10/6/20   Felicie Bellow, DO   butalbital-aspirin-caffeine-codeine (FIORINAL/CODEINE #3) -00-30 MG capsule Take 1 capsule by mouth every 4-6 hours as needed for Pain.     Historical Provider, MD   levothyroxine (SYNTHROID) 75 MCG tablet Take 75 mcg by mouth Daily    Historical Provider, MD   mirtazapine (REMERON) 30 MG tablet Take 30 mg by mouth nightly    Historical Provider, MD   pantoprazole (PROTONIX) 40 MG tablet Take 40 mg by mouth daily    Historical Provider, MD   sertraline (ZOLOFT) 50 MG tablet Take 50 mg by mouth daily    Historical Provider, MD       Allergies  Allergies   Allergen Reactions    Penicillins Hives and Rash       Review of Systems: +SOB/REYNAGA, both improved from admission      Objective  /63 (Site: Right Upper Arm, Position: Sitting, Cuff Size: Medium Adult)   Pulse 85   Temp 96.6 °F (35.9 °C) (Temporal)   Ht 5' 2\" (1.575 m)   Wt 105 lb (47.6 kg)   SpO2 90%   BMI 19.20 kg/m²     Physical Exam:   Performance Status:  General: AAO to person, place, time, in no acute distress, pleasant   Head and neck : PERRLA, EOMI . Sclera non icteric. Oropharynx : Clear  Neck: no JVD,  no adenopathy  LYMPHATICS : No LAD  Heart: Regular rate and regular rhythm, no murmur  Lungs: Clear to auscultation   Extremities: No edema,no cyanosis, no clubbing. Abdomen: Soft, non-tender;no masses, no organomegaly  Skin:  No rash  Neurologic:Cranial nerves grossly intact.  No focal motor or sensory deficits    Recent Laboratory Data-   Lab Results   Component Value Date    WBC 9.1 10/05/2020    HGB 8.2 (L) 10/05/2020    HCT 29.1 (L) 10/05/2020    MCV 74.4 (L) 10/05/2020     (H) 10/05/2020    LYMPHOPCT 18.0 (L) 10/05/2020    RBC 3.91 10/05/2020    MCH 21.0 (L) 10/05/2020    MCHC 28.2 (L) 10/05/2020    RDW 21.3 (H) 10/05/2020    NEUTOPHILPCT 70.0 10/05/2020    MONOPCT 6.0 10/05/2020    BASOPCT 1.0 10/05/2020    NEUTROABS 6.46 10/05/2020    LYMPHSABS 1.64 10/05/2020    MONOSABS 0.55 10/05/2020    EOSABS 0.36 10/05/2020    BASOSABS 0.09 10/05/2020       Lab Results   Component Value Date     10/05/2020    K 4.5 10/05/2020     10/05/2020    CO2 26 10/05/2020    BUN 13 10/05/2020    CREATININE 1.8 (H) 10/05/2020    GLUCOSE 102 (H) 10/05/2020    CALCIUM 9.3 10/05/2020    PROT 6.2 (L) 10/02/2020    LABALBU 3.2 (L) 10/02/2020    BILITOT 0.2 11/10/2019    ALKPHOS 154 (H) 11/10/2019    AST 17 11/10/2019    ALT 12 11/10/2019    LABGLOM 27 10/05/2020    GFRAA 33 10/05/2020       Lab Results   Component Value Date    IRON 12 (L) 09/29/2020    TIBC 302 09/29/2020    FERRITIN 36 09/29/2020           Radiology-    No orders to display         ASSESSMENT/PLAN :  68 yo female  CHF exacerbation  CKD III  Anemia of CKD    - Anemia is most likely related to her CKD, though with AZEEM contributing as well  - She denies any overt bleeding  - CEA normal  - She has been given IV Ferrlecit and this should continue for 5 doses. If she is discharged prior will evaluate for additional IV iron outpatient.  DC on oral iron if she can tolerate it  - Will give Aranesp x 1  - Check myeloma work up as well  - Trend CBC  - Transfuse with Hgb <7    11/13/20  - CBC today pending  - She is s/p IV iron for deficiency during admission  - To continue on oral iron  - MM work up showed normal Ig's, normal SPEP and ANDREW, though B2 was elevated at 3.8  - CBC today pending  - Recommend continuing Aranesp as outpatient for Hgb <10  - RTC in ~3 weeks    Electronically signed by Shaniqua Jay MD on 11/13/2020 at 1:12 PM

## 2020-12-08 ENCOUNTER — OFFICE VISIT (OUTPATIENT)
Dept: ONCOLOGY | Age: 75
End: 2020-12-08
Payer: MEDICARE

## 2020-12-08 VITALS
SYSTOLIC BLOOD PRESSURE: 143 MMHG | HEIGHT: 62 IN | TEMPERATURE: 96.8 F | OXYGEN SATURATION: 94 % | HEART RATE: 61 BPM | BODY MASS INDEX: 19.32 KG/M2 | DIASTOLIC BLOOD PRESSURE: 62 MMHG | WEIGHT: 105 LBS

## 2020-12-08 PROCEDURE — 99212 OFFICE O/P EST SF 10 MIN: CPT

## 2020-12-08 NOTE — PROGRESS NOTES
02/07/2013    PUD (peptic ulcer disease) 02/07/2013    Hypothyroid 02/07/2013        Past Surgical History:   Procedure Laterality Date    COLONOSCOPY      ENDOSCOPY, COLON, DIAGNOSTIC      FRACTURE SURGERY      left wrist, 2010-fx left hip    HYSTERECTOMY      JOINT REPLACEMENT      bilat knees    OTHER SURGICAL HISTORY Left 2-7-13    removal of hardware left hip left hip arthroplasty       Family History  Family History   Problem Relation Age of Onset    Cancer Sister         unsure        Social History    TOBACCO:   reports that she has never smoked. She has never used smokeless tobacco.  ETOH:   reports no history of alcohol use. Home Medications  Prior to Admission medications    Medication Sig Start Date End Date Taking? Authorizing Provider   calcium carbonate (TUMS) 500 MG chewable tablet Take 2 tablets by mouth daily Once in a while a couple days ago   Yes Historical Provider, MD   aspirin 81 MG chewable tablet Take 1 tablet by mouth daily 10/6/20  Yes Carson Lefort, DO   ferrous sulfate (IRON 325) 325 (65 Fe) MG tablet Take 1 tablet by mouth 2 times daily (with meals) 10/5/20  Yes Carson Lefort, DO   metoprolol succinate (TOPROL XL) 25 MG extended release tablet Take 1 tablet by mouth daily 10/6/20  Yes Carson Lefort, DO   rOPINIRole (REQUIP) 1 MG tablet Take 1 tablet by mouth nightly 10/5/20  Yes Carson Lefort, DO   atorvastatin (LIPITOR) 20 MG tablet Take 1 tablet by mouth daily 10/6/20  Yes Carson Lefort, DO   vitamin B-6 (B-6) 50 MG tablet Take 1 tablet by mouth daily 10/6/20  Yes Carson Gouldort, DO   butalbital-aspirin-caffeine-codeine (FIORINAL/CODEINE #3) -26-30 MG capsule Take 1 capsule by mouth every 4-6 hours as needed for Pain.    Yes Historical Provider, MD   levothyroxine (SYNTHROID) 75 MCG tablet Take 75 mcg by mouth Daily   Yes Historical Provider, MD   mirtazapine (REMERON) 30 MG tablet Take 30 mg by mouth nightly   Yes Historical Provider, MD   pantoprazole (PROTONIX) 40 MG tablet Take 40 mg by mouth daily   Yes Historical Provider, MD   sertraline (ZOLOFT) 50 MG tablet Take 50 mg by mouth daily   Yes Historical Provider, MD       Allergies  Allergies   Allergen Reactions    Penicillins Hives and Rash       Review of Systems: +SOB/REYNAGA, both improved from admission      Objective  BP (!) 143/62 (Site: Right Upper Arm, Position: Sitting, Cuff Size: Medium Adult)   Pulse 61   Temp 96.8 °F (36 °C) (Temporal)   Ht 5' 2\" (1.575 m)   Wt 105 lb (47.6 kg)   SpO2 94%   BMI 19.20 kg/m²     Physical Exam:   Performance Status:  General: AAO to person, place, time, in no acute distress, pleasant   Head and neck : PERRLA, EOMI . Sclera non icteric. Oropharynx : Clear  Neck: no JVD,  no adenopathy  LYMPHATICS : No LAD  Heart: Regular rate and regular rhythm, no murmur  Lungs: Clear to auscultation   Extremities: No edema,no cyanosis, no clubbing. Abdomen: Soft, non-tender;no masses, no organomegaly  Skin:  No rash  Neurologic:Cranial nerves grossly intact.  No focal motor or sensory deficits    Recent Laboratory Data-   Lab Results   Component Value Date    WBC 7.1 11/13/2020    HGB 11.1 (L) 11/13/2020    HCT 35.8 11/13/2020    MCV 86.7 11/13/2020     11/13/2020    LYMPHOPCT 21.2 11/13/2020    RBC 4.13 11/13/2020    MCH 26.9 11/13/2020    MCHC 31.0 (L) 11/13/2020    RDW NOT CALC 11/13/2020    NEUTOPHILPCT 69.9 11/13/2020    MONOPCT 7.1 11/13/2020    BASOPCT 0.9 11/13/2020    NEUTROABS 4.97 11/13/2020    LYMPHSABS 1.49 (L) 11/13/2020    MONOSABS 0.50 11/13/2020    EOSABS 0.06 11/13/2020    BASOSABS 0.06 11/13/2020       Lab Results   Component Value Date     11/13/2020    K 4.9 11/13/2020     11/13/2020    CO2 25 11/13/2020    BUN 24 (H) 11/13/2020    CREATININE 1.5 (H) 11/13/2020    GLUCOSE 103 (H) 11/13/2020    CALCIUM 9.0 11/13/2020    PROT 6.4 11/13/2020    LABALBU 3.8 11/13/2020    BILITOT <0.2 11/13/2020    ALKPHOS 78 11/13/2020    AST 25 11/13/2020    ALT 23 11/13/2020 LABGLOM 34 11/13/2020    GFRAA 41 11/13/2020       Lab Results   Component Value Date    IRON 43 11/13/2020    TIBC 171 (L) 11/13/2020    FERRITIN 72 11/13/2020           Radiology-    No orders to display         ASSESSMENT/PLAN :  66 yo female  CHF exacerbation  CKD III  Anemia of CKD    - Anemia is most likely related to her CKD, though with AZEEM contributing as well  - She denies any overt bleeding  - CEA normal  - She has been given IV Ferrlecit and this should continue for 5 doses. If she is discharged prior will evaluate for additional IV iron outpatient. DC on oral iron if she can tolerate it  - Will give Aranesp x 1  - Check myeloma work up as well  - Trend CBC  - Transfuse with Hgb <7    11/13/20  - CBC today pending  - She is s/p IV iron for deficiency during admission  - To continue on oral iron  - MM work up showed normal Ig's, normal SPEP and ANDREW, though B2 was elevated at 3.8  - CBC today pending  - Recommend continuing Aranesp as outpatient for Hgb <10  - RTC in ~3 weeks    12/8/20  - CBC with Hgb up to 11.1 on 11/13. MCV back to normal at 86  - Iron profile was improved  - Hold on Aranesp.  May need additional iron in future  - RTC in 6 weeks for repeat CBC    Electronically signed by Caitlin Pepe MD on 12/8/2020 at 2:36 PM

## 2021-02-21 ENCOUNTER — IMMUNIZATION (OUTPATIENT)
Dept: PRIMARY CARE CLINIC | Age: 76
End: 2021-02-21
Payer: MEDICARE

## 2021-02-21 PROCEDURE — 91300 COVID-19, PFIZER VACCINE 30MCG/0.3ML DOSE: CPT | Performed by: NURSE PRACTITIONER

## 2021-02-21 PROCEDURE — 0001A COVID-19, PFIZER VACCINE 30MCG/0.3ML DOSE: CPT | Performed by: NURSE PRACTITIONER

## 2021-03-16 ENCOUNTER — IMMUNIZATION (OUTPATIENT)
Dept: PRIMARY CARE CLINIC | Age: 76
End: 2021-03-16
Payer: MEDICARE

## 2021-03-16 PROCEDURE — 91300 COVID-19, PFIZER VACCINE 30MCG/0.3ML DOSE: CPT | Performed by: NURSE PRACTITIONER

## 2021-03-16 PROCEDURE — 0002A COVID-19, PFIZER VACCINE 30MCG/0.3ML DOSE: CPT | Performed by: NURSE PRACTITIONER

## 2021-03-26 ENCOUNTER — HOSPITAL ENCOUNTER (INPATIENT)
Age: 76
LOS: 8 days | Discharge: HOME HEALTH | DRG: 853 | End: 2021-04-03
Attending: EMERGENCY MEDICINE | Admitting: SURGERY
Payer: MEDICARE

## 2021-03-26 ENCOUNTER — APPOINTMENT (OUTPATIENT)
Dept: CT IMAGING | Age: 76
DRG: 853 | End: 2021-03-26
Payer: MEDICARE

## 2021-03-26 DIAGNOSIS — K56.609 SMALL BOWEL OBSTRUCTION (HCC): Primary | ICD-10-CM

## 2021-03-26 PROBLEM — K52.9 ENTERITIS: Status: ACTIVE | Noted: 2021-03-26

## 2021-03-26 LAB
ALBUMIN SERPL-MCNC: 4 G/DL (ref 3.5–5.2)
ALP BLD-CCNC: 125 U/L (ref 35–104)
ALT SERPL-CCNC: 8 U/L (ref 0–32)
ANION GAP SERPL CALCULATED.3IONS-SCNC: 14 MMOL/L (ref 7–16)
AST SERPL-CCNC: 13 U/L (ref 0–31)
BACTERIA: ABNORMAL /HPF
BASOPHILS ABSOLUTE: 0.04 E9/L (ref 0–0.2)
BASOPHILS RELATIVE PERCENT: 0.4 % (ref 0–2)
BILIRUB SERPL-MCNC: <0.2 MG/DL (ref 0–1.2)
BILIRUBIN URINE: NEGATIVE
BLOOD, URINE: ABNORMAL
BUN BLDV-MCNC: 21 MG/DL (ref 8–23)
CALCIUM SERPL-MCNC: 8.8 MG/DL (ref 8.6–10.2)
CHLORIDE BLD-SCNC: 109 MMOL/L (ref 98–107)
CLARITY: CLEAR
CO2: 21 MMOL/L (ref 22–29)
COLOR: YELLOW
CREAT SERPL-MCNC: 1.3 MG/DL (ref 0.5–1)
EKG ATRIAL RATE: 48 BPM
EKG P AXIS: 70 DEGREES
EKG P-R INTERVAL: 212 MS
EKG Q-T INTERVAL: 450 MS
EKG QRS DURATION: 78 MS
EKG QTC CALCULATION (BAZETT): 402 MS
EKG R AXIS: 13 DEGREES
EKG T AXIS: 57 DEGREES
EKG VENTRICULAR RATE: 48 BPM
EOSINOPHILS ABSOLUTE: 0 E9/L (ref 0.05–0.5)
EOSINOPHILS RELATIVE PERCENT: 0 % (ref 0–6)
EPITHELIAL CELLS, UA: ABNORMAL /HPF
GFR AFRICAN AMERICAN: 48
GFR NON-AFRICAN AMERICAN: 40 ML/MIN/1.73
GLUCOSE BLD-MCNC: 197 MG/DL (ref 74–99)
GLUCOSE URINE: NEGATIVE MG/DL
HCT VFR BLD CALC: 37 % (ref 34–48)
HEMOGLOBIN: 12.3 G/DL (ref 11.5–15.5)
IMMATURE GRANULOCYTES #: 0.05 E9/L
IMMATURE GRANULOCYTES %: 0.5 % (ref 0–5)
KETONES, URINE: NEGATIVE MG/DL
LACTIC ACID: 1.5 MMOL/L (ref 0.5–2.2)
LEUKOCYTE ESTERASE, URINE: ABNORMAL
LIPASE: 15 U/L (ref 13–60)
LYMPHOCYTES ABSOLUTE: 0.86 E9/L (ref 1.5–4)
LYMPHOCYTES RELATIVE PERCENT: 8.7 % (ref 20–42)
MCH RBC QN AUTO: 32.7 PG (ref 26–35)
MCHC RBC AUTO-ENTMCNC: 33.2 % (ref 32–34.5)
MCV RBC AUTO: 98.4 FL (ref 80–99.9)
MONOCYTES ABSOLUTE: 0.4 E9/L (ref 0.1–0.95)
MONOCYTES RELATIVE PERCENT: 4.1 % (ref 2–12)
NEUTROPHILS ABSOLUTE: 8.5 E9/L (ref 1.8–7.3)
NEUTROPHILS RELATIVE PERCENT: 86.3 % (ref 43–80)
NITRITE, URINE: NEGATIVE
PDW BLD-RTO: 13.5 FL (ref 11.5–15)
PH UA: 5 (ref 5–9)
PLATELET # BLD: 285 E9/L (ref 130–450)
PMV BLD AUTO: 10.5 FL (ref 7–12)
POTASSIUM SERPL-SCNC: 4.7 MMOL/L (ref 3.5–5)
PROTEIN UA: ABNORMAL MG/DL
RBC # BLD: 3.76 E12/L (ref 3.5–5.5)
RBC UA: ABNORMAL /HPF (ref 0–2)
SODIUM BLD-SCNC: 144 MMOL/L (ref 132–146)
SPECIFIC GRAVITY UA: >=1.03 (ref 1–1.03)
TOTAL PROTEIN: 6.7 G/DL (ref 6.4–8.3)
TROPONIN: <0.01 NG/ML (ref 0–0.03)
UROBILINOGEN, URINE: 0.2 E.U./DL
WBC # BLD: 9.9 E9/L (ref 4.5–11.5)
WBC UA: ABNORMAL /HPF (ref 0–5)

## 2021-03-26 PROCEDURE — 74177 CT ABD & PELVIS W/CONTRAST: CPT

## 2021-03-26 PROCEDURE — 6360000004 HC RX CONTRAST MEDICATION: Performed by: RADIOLOGY

## 2021-03-26 PROCEDURE — 85025 COMPLETE CBC W/AUTO DIFF WBC: CPT

## 2021-03-26 PROCEDURE — 6360000002 HC RX W HCPCS: Performed by: EMERGENCY MEDICINE

## 2021-03-26 PROCEDURE — 96372 THER/PROPH/DIAG INJ SC/IM: CPT

## 2021-03-26 PROCEDURE — 96375 TX/PRO/DX INJ NEW DRUG ADDON: CPT

## 2021-03-26 PROCEDURE — 93010 ELECTROCARDIOGRAM REPORT: CPT | Performed by: INTERNAL MEDICINE

## 2021-03-26 PROCEDURE — 99285 EMERGENCY DEPT VISIT HI MDM: CPT

## 2021-03-26 PROCEDURE — 93005 ELECTROCARDIOGRAM TRACING: CPT | Performed by: EMERGENCY MEDICINE

## 2021-03-26 PROCEDURE — 99223 1ST HOSP IP/OBS HIGH 75: CPT | Performed by: SURGERY

## 2021-03-26 PROCEDURE — 84484 ASSAY OF TROPONIN QUANT: CPT

## 2021-03-26 PROCEDURE — 83605 ASSAY OF LACTIC ACID: CPT

## 2021-03-26 PROCEDURE — 6360000002 HC RX W HCPCS: Performed by: SURGERY

## 2021-03-26 PROCEDURE — 80053 COMPREHEN METABOLIC PANEL: CPT

## 2021-03-26 PROCEDURE — 81001 URINALYSIS AUTO W/SCOPE: CPT

## 2021-03-26 PROCEDURE — 83690 ASSAY OF LIPASE: CPT

## 2021-03-26 PROCEDURE — 96374 THER/PROPH/DIAG INJ IV PUSH: CPT

## 2021-03-26 PROCEDURE — 96376 TX/PRO/DX INJ SAME DRUG ADON: CPT

## 2021-03-26 PROCEDURE — 1200000000 HC SEMI PRIVATE

## 2021-03-26 PROCEDURE — 74176 CT ABD & PELVIS W/O CONTRAST: CPT

## 2021-03-26 RX ORDER — MORPHINE SULFATE 5 MG/ML
5 INJECTION, SOLUTION INTRAMUSCULAR; INTRAVENOUS ONCE
Status: COMPLETED | OUTPATIENT
Start: 2021-03-26 | End: 2021-03-26

## 2021-03-26 RX ORDER — SODIUM CHLORIDE 9 MG/ML
25 INJECTION, SOLUTION INTRAVENOUS PRN
Status: DISCONTINUED | OUTPATIENT
Start: 2021-03-26 | End: 2021-04-03 | Stop reason: HOSPADM

## 2021-03-26 RX ORDER — CIPROFLOXACIN 2 MG/ML
400 INJECTION, SOLUTION INTRAVENOUS EVERY 24 HOURS
Status: DISCONTINUED | OUTPATIENT
Start: 2021-03-27 | End: 2021-03-29

## 2021-03-26 RX ORDER — LEVOTHYROXINE SODIUM 0.07 MG/1
75 TABLET ORAL DAILY
Status: DISCONTINUED | OUTPATIENT
Start: 2021-03-27 | End: 2021-04-03 | Stop reason: HOSPADM

## 2021-03-26 RX ORDER — CALCIUM CARBONATE 200(500)MG
2 TABLET,CHEWABLE ORAL DAILY
Status: DISCONTINUED | OUTPATIENT
Start: 2021-03-27 | End: 2021-04-03 | Stop reason: HOSPADM

## 2021-03-26 RX ORDER — MORPHINE SULFATE 2 MG/ML
2 INJECTION, SOLUTION INTRAMUSCULAR; INTRAVENOUS
Status: DISCONTINUED | OUTPATIENT
Start: 2021-03-26 | End: 2021-03-29

## 2021-03-26 RX ORDER — ROPINIROLE 1 MG/1
1 TABLET, FILM COATED ORAL NIGHTLY
Status: DISCONTINUED | OUTPATIENT
Start: 2021-03-26 | End: 2021-04-03 | Stop reason: HOSPADM

## 2021-03-26 RX ORDER — PANTOPRAZOLE SODIUM 40 MG/1
40 TABLET, DELAYED RELEASE ORAL DAILY
Status: DISCONTINUED | OUTPATIENT
Start: 2021-03-27 | End: 2021-03-26 | Stop reason: SDUPTHER

## 2021-03-26 RX ORDER — MIRTAZAPINE 15 MG/1
30 TABLET, FILM COATED ORAL NIGHTLY
Status: DISCONTINUED | OUTPATIENT
Start: 2021-03-26 | End: 2021-04-03 | Stop reason: HOSPADM

## 2021-03-26 RX ORDER — ONDANSETRON 2 MG/ML
4 INJECTION INTRAMUSCULAR; INTRAVENOUS EVERY 6 HOURS PRN
Status: DISCONTINUED | OUTPATIENT
Start: 2021-03-26 | End: 2021-04-03 | Stop reason: HOSPADM

## 2021-03-26 RX ORDER — SODIUM CHLORIDE 0.9 % (FLUSH) 0.9 %
10 SYRINGE (ML) INJECTION EVERY 12 HOURS SCHEDULED
Status: DISCONTINUED | OUTPATIENT
Start: 2021-03-26 | End: 2021-04-03 | Stop reason: HOSPADM

## 2021-03-26 RX ORDER — ASPIRIN 81 MG/1
81 TABLET, CHEWABLE ORAL DAILY
Status: DISCONTINUED | OUTPATIENT
Start: 2021-03-27 | End: 2021-04-03 | Stop reason: HOSPADM

## 2021-03-26 RX ORDER — ONDANSETRON 2 MG/ML
4 INJECTION INTRAMUSCULAR; INTRAVENOUS ONCE
Status: COMPLETED | OUTPATIENT
Start: 2021-03-26 | End: 2021-03-26

## 2021-03-26 RX ORDER — FERROUS SULFATE 325(65) MG
325 TABLET ORAL 2 TIMES DAILY WITH MEALS
Status: DISCONTINUED | OUTPATIENT
Start: 2021-03-27 | End: 2021-04-03 | Stop reason: HOSPADM

## 2021-03-26 RX ORDER — SODIUM CHLORIDE 9 MG/ML
10 INJECTION INTRAVENOUS DAILY
Status: DISCONTINUED | OUTPATIENT
Start: 2021-03-27 | End: 2021-03-31

## 2021-03-26 RX ORDER — PANTOPRAZOLE SODIUM 40 MG/10ML
40 INJECTION, POWDER, LYOPHILIZED, FOR SOLUTION INTRAVENOUS DAILY
Status: DISCONTINUED | OUTPATIENT
Start: 2021-03-27 | End: 2021-03-31

## 2021-03-26 RX ORDER — SODIUM CHLORIDE 9 MG/ML
INJECTION, SOLUTION INTRAVENOUS CONTINUOUS
Status: DISCONTINUED | OUTPATIENT
Start: 2021-03-26 | End: 2021-03-29

## 2021-03-26 RX ORDER — ONDANSETRON 4 MG/1
4 TABLET, ORALLY DISINTEGRATING ORAL EVERY 8 HOURS PRN
Status: DISCONTINUED | OUTPATIENT
Start: 2021-03-26 | End: 2021-04-03 | Stop reason: HOSPADM

## 2021-03-26 RX ORDER — ATORVASTATIN CALCIUM 20 MG/1
20 TABLET, FILM COATED ORAL DAILY
Status: DISCONTINUED | OUTPATIENT
Start: 2021-03-27 | End: 2021-04-03 | Stop reason: HOSPADM

## 2021-03-26 RX ORDER — SODIUM CHLORIDE 0.9 % (FLUSH) 0.9 %
10 SYRINGE (ML) INJECTION PRN
Status: DISCONTINUED | OUTPATIENT
Start: 2021-03-26 | End: 2021-04-03 | Stop reason: HOSPADM

## 2021-03-26 RX ORDER — LANOLIN ALCOHOL/MO/W.PET/CERES
50 CREAM (GRAM) TOPICAL DAILY
Status: DISCONTINUED | OUTPATIENT
Start: 2021-03-27 | End: 2021-04-03 | Stop reason: HOSPADM

## 2021-03-26 RX ORDER — METOPROLOL SUCCINATE 25 MG/1
25 TABLET, EXTENDED RELEASE ORAL DAILY
Status: DISCONTINUED | OUTPATIENT
Start: 2021-03-27 | End: 2021-04-03 | Stop reason: HOSPADM

## 2021-03-26 RX ADMIN — MORPHINE SULFATE 2 MG: 2 INJECTION, SOLUTION INTRAMUSCULAR; INTRAVENOUS at 22:21

## 2021-03-26 RX ADMIN — ONDANSETRON 4 MG: 2 INJECTION INTRAMUSCULAR; INTRAVENOUS at 15:35

## 2021-03-26 RX ADMIN — MORPHINE SULFATE 5 MG: 5 INJECTION, SOLUTION INTRAMUSCULAR; INTRAVENOUS at 18:32

## 2021-03-26 RX ADMIN — IOPAMIDOL 75 ML: 755 INJECTION, SOLUTION INTRAVENOUS at 17:26

## 2021-03-26 RX ADMIN — MORPHINE SULFATE 5 MG: 5 INJECTION, SOLUTION INTRAMUSCULAR; INTRAVENOUS at 15:40

## 2021-03-26 RX ADMIN — MORPHINE SULFATE 5 MG: 5 INJECTION, SOLUTION INTRAMUSCULAR; INTRAVENOUS at 16:20

## 2021-03-26 RX ADMIN — TRIMETHOBENZAMIDE HYDROCHLORIDE 200 MG: 100 INJECTION INTRAMUSCULAR at 16:20

## 2021-03-26 RX ADMIN — IOHEXOL 50 ML: 240 INJECTION, SOLUTION INTRATHECAL; INTRAVASCULAR; INTRAVENOUS; ORAL at 22:28

## 2021-03-26 ASSESSMENT — PAIN SCALES - GENERAL
PAINLEVEL_OUTOF10: 10
PAINLEVEL_OUTOF10: 8
PAINLEVEL_OUTOF10: 10
PAINLEVEL_OUTOF10: 6
PAINLEVEL_OUTOF10: 10

## 2021-03-26 ASSESSMENT — ENCOUNTER SYMPTOMS
ABDOMINAL PAIN: 1
SHORTNESS OF BREATH: 0
COUGH: 0
CHEST TIGHTNESS: 0
WHEEZING: 0
SINUS PRESSURE: 0
DIARRHEA: 0
VOMITING: 0
NAUSEA: 1
ABDOMINAL DISTENTION: 0
SORE THROAT: 0
BACK PAIN: 0

## 2021-03-26 ASSESSMENT — PAIN DESCRIPTION - PAIN TYPE
TYPE: ACUTE PAIN
TYPE: ACUTE PAIN

## 2021-03-26 ASSESSMENT — PAIN DESCRIPTION - LOCATION
LOCATION: ABDOMEN
LOCATION: ABDOMEN

## 2021-03-26 NOTE — ED NOTES
Radiology Procedure Waiver   Name: Milton Garcia  : 1945  MRN: [id number]    Date:  3/26/21    Time: 5:22 PM EDT    Benefits of immediately proceeding with Radiology exam(s) without pre-testing outweigh the risks or are not indicated as specified below and therefore the following is/are being waived:    [] Pregnancy test   [] Patients LMP on-time and regular.   [] Patient had Tubal Ligation or has other Contraception Device. [] Patient  is Menopausal or Premenarcheal.    [] Patient had Full or Partial Hysterectomy. [] Protocol for Iodine allergy    [] MRI Questionnaire     [x] BUN/Creatinine   [] Patient age w/no hx of renal dysfunction. [] Patient on Dialysis. [] Recent Normal Labs. Electronically signed by Re Kumar DO on 3/26/21 at 5:22 PM EDT          Patient is 78-year-old female with a fairly significant abdominal pain complaints requiring several rounds of medication. Just barely under the listed cut off of renal function.   We will hydrate the patient but she would benefit from IV contrast with the [facility]  [id number]

## 2021-03-27 ENCOUNTER — ANESTHESIA EVENT (OUTPATIENT)
Dept: OPERATING ROOM | Age: 76
DRG: 853 | End: 2021-03-27
Payer: MEDICARE

## 2021-03-27 ENCOUNTER — ANESTHESIA (OUTPATIENT)
Dept: OPERATING ROOM | Age: 76
DRG: 853 | End: 2021-03-27
Payer: MEDICARE

## 2021-03-27 VITALS
DIASTOLIC BLOOD PRESSURE: 63 MMHG | OXYGEN SATURATION: 99 % | TEMPERATURE: 99.1 F | SYSTOLIC BLOOD PRESSURE: 93 MMHG | RESPIRATION RATE: 2 BRPM

## 2021-03-27 PROBLEM — K56.609 SMALL BOWEL OBSTRUCTION (HCC): Status: ACTIVE | Noted: 2021-03-27

## 2021-03-27 LAB
ANION GAP SERPL CALCULATED.3IONS-SCNC: 13 MMOL/L (ref 7–16)
BUN BLDV-MCNC: 22 MG/DL (ref 8–23)
CALCIUM SERPL-MCNC: 8.8 MG/DL (ref 8.6–10.2)
CHLORIDE BLD-SCNC: 104 MMOL/L (ref 98–107)
CO2: 21 MMOL/L (ref 22–29)
CREAT SERPL-MCNC: 1.4 MG/DL (ref 0.5–1)
FOLATE: 9.9 NG/ML (ref 4.8–24.2)
GFR AFRICAN AMERICAN: 44
GFR NON-AFRICAN AMERICAN: 36 ML/MIN/1.73
GLUCOSE BLD-MCNC: 145 MG/DL (ref 74–99)
HCT VFR BLD CALC: 38 % (ref 34–48)
HEMOGLOBIN: 12 G/DL (ref 11.5–15.5)
MCH RBC QN AUTO: 31.9 PG (ref 26–35)
MCHC RBC AUTO-ENTMCNC: 31.6 % (ref 32–34.5)
MCV RBC AUTO: 101.1 FL (ref 80–99.9)
PDW BLD-RTO: 13.6 FL (ref 11.5–15)
PLATELET # BLD: 261 E9/L (ref 130–450)
PMV BLD AUTO: 10.9 FL (ref 7–12)
POTASSIUM REFLEX MAGNESIUM: 5.2 MMOL/L (ref 3.5–5)
RBC # BLD: 3.76 E12/L (ref 3.5–5.5)
SODIUM BLD-SCNC: 138 MMOL/L (ref 132–146)
VITAMIN B-12: 521 PG/ML (ref 211–946)
WBC # BLD: 18.3 E9/L (ref 4.5–11.5)

## 2021-03-27 PROCEDURE — 6370000000 HC RX 637 (ALT 250 FOR IP): Performed by: SURGERY

## 2021-03-27 PROCEDURE — 6360000002 HC RX W HCPCS: Performed by: SURGERY

## 2021-03-27 PROCEDURE — 88307 TISSUE EXAM BY PATHOLOGIST: CPT

## 2021-03-27 PROCEDURE — 3700000001 HC ADD 15 MINUTES (ANESTHESIA): Performed by: SURGERY

## 2021-03-27 PROCEDURE — 7100000001 HC PACU RECOVERY - ADDTL 15 MIN: Performed by: SURGERY

## 2021-03-27 PROCEDURE — 80048 BASIC METABOLIC PNL TOTAL CA: CPT

## 2021-03-27 PROCEDURE — 7100000000 HC PACU RECOVERY - FIRST 15 MIN: Performed by: SURGERY

## 2021-03-27 PROCEDURE — 2500000003 HC RX 250 WO HCPCS: Performed by: SURGERY

## 2021-03-27 PROCEDURE — 82746 ASSAY OF FOLIC ACID SERUM: CPT

## 2021-03-27 PROCEDURE — 82607 VITAMIN B-12: CPT

## 2021-03-27 PROCEDURE — 2580000003 HC RX 258: Performed by: SURGERY

## 2021-03-27 PROCEDURE — 2709999900 HC NON-CHARGEABLE SUPPLY: Performed by: SURGERY

## 2021-03-27 PROCEDURE — 2500000003 HC RX 250 WO HCPCS: Performed by: NURSE ANESTHETIST, CERTIFIED REGISTERED

## 2021-03-27 PROCEDURE — 1200000000 HC SEMI PRIVATE

## 2021-03-27 PROCEDURE — 2580000003 HC RX 258: Performed by: NURSE ANESTHETIST, CERTIFIED REGISTERED

## 2021-03-27 PROCEDURE — 44120 REMOVAL OF SMALL INTESTINE: CPT | Performed by: SURGERY

## 2021-03-27 PROCEDURE — 6360000002 HC RX W HCPCS: Performed by: NURSE ANESTHETIST, CERTIFIED REGISTERED

## 2021-03-27 PROCEDURE — 85027 COMPLETE CBC AUTOMATED: CPT

## 2021-03-27 PROCEDURE — 3600000004 HC SURGERY LEVEL 4 BASE: Performed by: SURGERY

## 2021-03-27 PROCEDURE — 3600000014 HC SURGERY LEVEL 4 ADDTL 15MIN: Performed by: SURGERY

## 2021-03-27 PROCEDURE — C9113 INJ PANTOPRAZOLE SODIUM, VIA: HCPCS | Performed by: SURGERY

## 2021-03-27 PROCEDURE — 36415 COLL VENOUS BLD VENIPUNCTURE: CPT

## 2021-03-27 PROCEDURE — 3700000000 HC ANESTHESIA ATTENDED CARE: Performed by: SURGERY

## 2021-03-27 PROCEDURE — 2720000010 HC SURG SUPPLY STERILE: Performed by: SURGERY

## 2021-03-27 PROCEDURE — 0DT80ZZ RESECTION OF SMALL INTESTINE, OPEN APPROACH: ICD-10-PCS | Performed by: SURGERY

## 2021-03-27 PROCEDURE — 6360000002 HC RX W HCPCS

## 2021-03-27 RX ORDER — NEOSTIGMINE METHYLSULFATE 1 MG/ML
INJECTION, SOLUTION INTRAVENOUS PRN
Status: DISCONTINUED | OUTPATIENT
Start: 2021-03-27 | End: 2021-03-27 | Stop reason: SDUPTHER

## 2021-03-27 RX ORDER — FENTANYL CITRATE 50 UG/ML
INJECTION, SOLUTION INTRAMUSCULAR; INTRAVENOUS PRN
Status: DISCONTINUED | OUTPATIENT
Start: 2021-03-27 | End: 2021-03-27 | Stop reason: SDUPTHER

## 2021-03-27 RX ORDER — MORPHINE SULFATE 2 MG/ML
2 INJECTION, SOLUTION INTRAMUSCULAR; INTRAVENOUS EVERY 5 MIN PRN
Status: DISCONTINUED | OUTPATIENT
Start: 2021-03-27 | End: 2021-03-27 | Stop reason: HOSPADM

## 2021-03-27 RX ORDER — LIDOCAINE HYDROCHLORIDE 20 MG/ML
INJECTION, SOLUTION INFILTRATION; PERINEURAL PRN
Status: DISCONTINUED | OUTPATIENT
Start: 2021-03-27 | End: 2021-03-27 | Stop reason: SDUPTHER

## 2021-03-27 RX ORDER — GLYCOPYRROLATE 0.2 MG/ML
INJECTION INTRAMUSCULAR; INTRAVENOUS PRN
Status: DISCONTINUED | OUTPATIENT
Start: 2021-03-27 | End: 2021-03-27 | Stop reason: SDUPTHER

## 2021-03-27 RX ORDER — SODIUM CHLORIDE, SODIUM LACTATE, POTASSIUM CHLORIDE, CALCIUM CHLORIDE 600; 310; 30; 20 MG/100ML; MG/100ML; MG/100ML; MG/100ML
INJECTION, SOLUTION INTRAVENOUS CONTINUOUS PRN
Status: DISCONTINUED | OUTPATIENT
Start: 2021-03-27 | End: 2021-03-27 | Stop reason: SDUPTHER

## 2021-03-27 RX ORDER — PROPOFOL 10 MG/ML
INJECTION, EMULSION INTRAVENOUS PRN
Status: DISCONTINUED | OUTPATIENT
Start: 2021-03-27 | End: 2021-03-27 | Stop reason: SDUPTHER

## 2021-03-27 RX ORDER — ONDANSETRON 2 MG/ML
INJECTION INTRAMUSCULAR; INTRAVENOUS PRN
Status: DISCONTINUED | OUTPATIENT
Start: 2021-03-27 | End: 2021-03-27 | Stop reason: SDUPTHER

## 2021-03-27 RX ORDER — MEPERIDINE HYDROCHLORIDE 25 MG/ML
12.5 INJECTION INTRAMUSCULAR; INTRAVENOUS; SUBCUTANEOUS EVERY 5 MIN PRN
Status: DISCONTINUED | OUTPATIENT
Start: 2021-03-27 | End: 2021-03-27 | Stop reason: HOSPADM

## 2021-03-27 RX ORDER — PROMETHAZINE HYDROCHLORIDE 25 MG/ML
6.25 INJECTION, SOLUTION INTRAMUSCULAR; INTRAVENOUS
Status: DISCONTINUED | OUTPATIENT
Start: 2021-03-27 | End: 2021-03-27 | Stop reason: HOSPADM

## 2021-03-27 RX ORDER — ROCURONIUM BROMIDE 10 MG/ML
INJECTION, SOLUTION INTRAVENOUS PRN
Status: DISCONTINUED | OUTPATIENT
Start: 2021-03-27 | End: 2021-03-27 | Stop reason: SDUPTHER

## 2021-03-27 RX ORDER — ONDANSETRON 2 MG/ML
4 INJECTION INTRAMUSCULAR; INTRAVENOUS
Status: DISCONTINUED | OUTPATIENT
Start: 2021-03-27 | End: 2021-03-27 | Stop reason: HOSPADM

## 2021-03-27 RX ORDER — BUPIVACAINE HYDROCHLORIDE AND EPINEPHRINE 2.5; 5 MG/ML; UG/ML
INJECTION, SOLUTION EPIDURAL; INFILTRATION; INTRACAUDAL; PERINEURAL PRN
Status: DISCONTINUED | OUTPATIENT
Start: 2021-03-27 | End: 2021-03-27 | Stop reason: ALTCHOICE

## 2021-03-27 RX ADMIN — LIDOCAINE HYDROCHLORIDE 50 MG: 20 INJECTION, SOLUTION INFILTRATION; PERINEURAL at 13:15

## 2021-03-27 RX ADMIN — METRONIDAZOLE 500 MG: 500 INJECTION, SOLUTION INTRAVENOUS at 16:49

## 2021-03-27 RX ADMIN — SODIUM CHLORIDE, PRESERVATIVE FREE 10 ML: 5 INJECTION INTRAVENOUS at 20:07

## 2021-03-27 RX ADMIN — PROPOFOL 100 MG: 10 INJECTION, EMULSION INTRAVENOUS at 13:15

## 2021-03-27 RX ADMIN — GLYCOPYRROLATE 0.2 MG: 0.2 INJECTION, SOLUTION INTRAMUSCULAR; INTRAVENOUS at 14:19

## 2021-03-27 RX ADMIN — FENTANYL CITRATE 50 MCG: 50 INJECTION, SOLUTION INTRAMUSCULAR; INTRAVENOUS at 14:44

## 2021-03-27 RX ADMIN — CIPROFLOXACIN 400 MG: 2 INJECTION, SOLUTION INTRAVENOUS at 23:44

## 2021-03-27 RX ADMIN — HYDROMORPHONE HYDROCHLORIDE 0.5 MG: 1 INJECTION, SOLUTION INTRAMUSCULAR; INTRAVENOUS; SUBCUTANEOUS at 14:59

## 2021-03-27 RX ADMIN — MORPHINE SULFATE 2 MG: 2 INJECTION, SOLUTION INTRAMUSCULAR; INTRAVENOUS at 01:25

## 2021-03-27 RX ADMIN — MORPHINE SULFATE 2 MG: 2 INJECTION, SOLUTION INTRAMUSCULAR; INTRAVENOUS at 07:33

## 2021-03-27 RX ADMIN — FENTANYL CITRATE 50 MCG: 50 INJECTION, SOLUTION INTRAMUSCULAR; INTRAVENOUS at 14:13

## 2021-03-27 RX ADMIN — ROCURONIUM BROMIDE 35 MG: 10 SOLUTION INTRAVENOUS at 13:15

## 2021-03-27 RX ADMIN — PHENYLEPHRINE HYDROCHLORIDE 100 MCG: 10 INJECTION INTRAVENOUS at 13:27

## 2021-03-27 RX ADMIN — SODIUM CHLORIDE: 9 INJECTION, SOLUTION INTRAVENOUS at 00:30

## 2021-03-27 RX ADMIN — FENTANYL CITRATE 50 MCG: 50 INJECTION, SOLUTION INTRAMUSCULAR; INTRAVENOUS at 13:11

## 2021-03-27 RX ADMIN — ROCURONIUM BROMIDE 15 MG: 10 SOLUTION INTRAVENOUS at 14:00

## 2021-03-27 RX ADMIN — METRONIDAZOLE 500 MG: 500 INJECTION, SOLUTION INTRAVENOUS at 23:44

## 2021-03-27 RX ADMIN — CIPROFLOXACIN 400 MG: 2 INJECTION, SOLUTION INTRAVENOUS at 00:30

## 2021-03-27 RX ADMIN — LEVOTHYROXINE SODIUM 75 MCG: 75 TABLET ORAL at 07:35

## 2021-03-27 RX ADMIN — Medication 0.5 MG: at 14:59

## 2021-03-27 RX ADMIN — METOPROLOL SUCCINATE 25 MG: 25 TABLET, EXTENDED RELEASE ORAL at 07:35

## 2021-03-27 RX ADMIN — FENTANYL CITRATE 50 MCG: 50 INJECTION, SOLUTION INTRAMUSCULAR; INTRAVENOUS at 14:41

## 2021-03-27 RX ADMIN — METRONIDAZOLE 500 MG: 500 INJECTION, SOLUTION INTRAVENOUS at 01:31

## 2021-03-27 RX ADMIN — MORPHINE SULFATE 2 MG: 2 INJECTION, SOLUTION INTRAMUSCULAR; INTRAVENOUS at 23:11

## 2021-03-27 RX ADMIN — PANTOPRAZOLE SODIUM 40 MG: 40 INJECTION, POWDER, FOR SOLUTION INTRAVENOUS at 07:33

## 2021-03-27 RX ADMIN — ONDANSETRON 4 MG: 2 INJECTION INTRAMUSCULAR; INTRAVENOUS at 13:55

## 2021-03-27 RX ADMIN — Medication 3 MG: at 14:19

## 2021-03-27 RX ADMIN — MORPHINE SULFATE 2 MG: 2 INJECTION, SOLUTION INTRAMUSCULAR; INTRAVENOUS at 20:03

## 2021-03-27 RX ADMIN — PHENYLEPHRINE HYDROCHLORIDE 100 MCG: 10 INJECTION INTRAVENOUS at 13:29

## 2021-03-27 RX ADMIN — SODIUM CHLORIDE, POTASSIUM CHLORIDE, SODIUM LACTATE AND CALCIUM CHLORIDE: 600; 310; 30; 20 INJECTION, SOLUTION INTRAVENOUS at 13:08

## 2021-03-27 RX ADMIN — FENTANYL CITRATE 50 MCG: 50 INJECTION, SOLUTION INTRAMUSCULAR; INTRAVENOUS at 13:15

## 2021-03-27 RX ADMIN — SODIUM CHLORIDE: 9 INJECTION, SOLUTION INTRAVENOUS at 16:55

## 2021-03-27 RX ADMIN — METRONIDAZOLE 500 MG: 500 INJECTION, SOLUTION INTRAVENOUS at 07:35

## 2021-03-27 ASSESSMENT — PULMONARY FUNCTION TESTS
PIF_VALUE: 25
PIF_VALUE: 18
PIF_VALUE: 0
PIF_VALUE: 14
PIF_VALUE: 17
PIF_VALUE: 14
PIF_VALUE: 14
PIF_VALUE: 17
PIF_VALUE: 13
PIF_VALUE: 16
PIF_VALUE: 1
PIF_VALUE: 17
PIF_VALUE: 1
PIF_VALUE: 16
PIF_VALUE: 14
PIF_VALUE: 24
PIF_VALUE: 16
PIF_VALUE: 16
PIF_VALUE: 17
PIF_VALUE: 18
PIF_VALUE: 0
PIF_VALUE: 24
PIF_VALUE: 0
PIF_VALUE: 16
PIF_VALUE: 14
PIF_VALUE: 25
PIF_VALUE: 13
PIF_VALUE: 0
PIF_VALUE: 19
PIF_VALUE: 1
PIF_VALUE: 15
PIF_VALUE: 0
PIF_VALUE: 24
PIF_VALUE: 15
PIF_VALUE: 0
PIF_VALUE: 15
PIF_VALUE: 4
PIF_VALUE: 0
PIF_VALUE: 39
PIF_VALUE: 16
PIF_VALUE: 15
PIF_VALUE: 0
PIF_VALUE: 31
PIF_VALUE: 17
PIF_VALUE: 3
PIF_VALUE: 4
PIF_VALUE: 17
PIF_VALUE: 16
PIF_VALUE: 16
PIF_VALUE: 0

## 2021-03-27 ASSESSMENT — PAIN DESCRIPTION - FREQUENCY
FREQUENCY: CONTINUOUS

## 2021-03-27 ASSESSMENT — PAIN SCALES - GENERAL
PAINLEVEL_OUTOF10: 5
PAINLEVEL_OUTOF10: 9
PAINLEVEL_OUTOF10: 6
PAINLEVEL_OUTOF10: 6
PAINLEVEL_OUTOF10: 7
PAINLEVEL_OUTOF10: 5
PAINLEVEL_OUTOF10: 7
PAINLEVEL_OUTOF10: 9
PAINLEVEL_OUTOF10: 7
PAINLEVEL_OUTOF10: 10
PAINLEVEL_OUTOF10: 5

## 2021-03-27 ASSESSMENT — PAIN DESCRIPTION - LOCATION
LOCATION: ABDOMEN

## 2021-03-27 ASSESSMENT — PAIN DESCRIPTION - PAIN TYPE
TYPE: SURGICAL PAIN
TYPE: ACUTE PAIN
TYPE: SURGICAL PAIN

## 2021-03-27 ASSESSMENT — PAIN DESCRIPTION - PROGRESSION: CLINICAL_PROGRESSION: GRADUALLY IMPROVING

## 2021-03-27 ASSESSMENT — LIFESTYLE VARIABLES: SMOKING_STATUS: 0

## 2021-03-27 ASSESSMENT — PAIN DESCRIPTION - DESCRIPTORS
DESCRIPTORS: ACHING;BURNING
DESCRIPTORS: ACHING
DESCRIPTORS: ACHING;CRAMPING;CONSTANT

## 2021-03-27 ASSESSMENT — ENCOUNTER SYMPTOMS: SHORTNESS OF BREATH: 1

## 2021-03-27 NOTE — OP NOTE
some omental adhesions to the anterior abdominal wall and in the right side of the pelvis. A long loop of necrotic purple small bowel could be seen in the right lower quadrant. Another 5mm trocar was placed in the left midabdomen and another 5 mm supraumbilical trocar was placed. Using the hook cautery, I proceeded to lyse adhesions from the omentum to the anterior abdominal wall. Once this was done, I used atraumatic graspers to then evaluate the small bowel. The small bowel was run from the ligament of Treitz down to the level of the right lower quadrant point of obstruction where there was a thick adhesive band causing a closed loop small bowel obstruction in the mid ileum. I then proceeded to perform a mini laparotomy for bowel resection. A midline laparotomy was made with a 15 blade scalpel. Electrocautery was used to dissect through the subcutaneous tissues down to the level of the fascia and the abdominal cavity was entered. The small bowel was eviscerated in order to expose the pelvis. Again a thick adhesive band from the retroperitoneum to the right pelvis was located. It was lysed with the LigaSure device. Once this was done, the bowel was freed up and could be brought out through the incision. About 30 cm of necrotic small bowel was noted. A mesenteric window was then made proximally and the proximal transection point was performed with a KARRI blue load stapler. Another mesenteric window was made distally and the distal transection point was performed with another KARRI blue load stapler. The small bowel mesentery was then transected with a Ligasure device. The small bowel anastomosis was then made by lining up the antimesenteric sides of the small bowel and the corners of the staple line were cut off and two enterotomies were made. The anastomosis was then made with another 75mm KARRI blue load stapler and then common channel was then stapled off as well.  The mesenteric defect was closed with 3-0 silk suture. The bowel was placed back in the abdomen. The small bowel was then run from the ligament of Treitz to the terminal ileum without any other abnormality. The abdomen was copiously irrigated with warm saline. The fascia was closed with 0 looped PDS x2 tied in the middle. The skin was closed with 3-0 Vicryl running suture. The left abdomen trocar sites were closed with 4-0 Monocryl sutures. Skin glue was placed over all incisions.       Needle sponge and instrument count were correct x2     DISPOSITION: Anesthesia was discontinued and the patient was extubated prior to leaving the OR. She was transferred to PACU in stable condition and will be sent to tele floor.     Electronically signed by Eleni Patiño MD on 3/27/2021 at 2:32 PM

## 2021-03-27 NOTE — ED NOTES
Pt drank half of contrast, unable to drink anymore. Ct notified.       [facility]  03/26/21 2110

## 2021-03-27 NOTE — ANESTHESIA PRE PROCEDURE
(FLAGYL) 500 mg in NaCl 100 mL IVPB premix  500 mg Intravenous Nikki Purvis MD   Stopped at 03/27/21 0835    ciprofloxacin (CIPRO) IVPB 400 mg  400 mg Intravenous Q24H Zahra Mccollum MD   Stopped at 03/27/21 0130    pantoprazole (PROTONIX) injection 40 mg  40 mg Intravenous Daily Zahra Mccollum MD   40 mg at 03/27/21 9333    And    sodium chloride (PF) 0.9 % injection 10 mL  10 mL Intravenous Daily Zahra Mccollum MD           Allergies:     Allergies   Allergen Reactions    Penicillins Hives and Rash       Problem List:    Patient Active Problem List   Diagnosis Code    Arthritis, hip M16.10    Avascular necrosis of bone of hip ([facility] M87.059    HLD (hyperlipidemia) E78.5    Migraine G43.909    PUD (peptic ulcer disease) K27.9    Hypothyroid E03.9    Transient cerebral ischemia G45.9    [place] (acute kidney injury) ([facility] N17.9    Acute on chronic diastolic (congestive) heart failure ([facility]) I50.33    CHF (congestive heart failure), NYHA class I, acute on chronic, combined (HCC) I50.43    Precordial pain R07.2    SOB (shortness of breath) R06.02    Enteritis K52.9    Small bowel obstruction ([facility] K56.609       Past Medical History:        Diagnosis Date    Acute on chronic diastolic (congestive) heart failure ([facility] 9/29/2020    Arthritis     GERD (gastroesophageal reflux disease)     H/O cardiovascular stress test 10/03/2020    Lexiscan    History of blood transfusion     Hyperlipidemia     Migraines     Thyroid disease        Past Surgical History:        Procedure Laterality Date    COLONOSCOPY      ENDOSCOPY, COLON, DIAGNOSTIC      FRACTURE SURGERY      left wrist, 2010-fx left hip    HYSTERECTOMY      JOINT REPLACEMENT      bilat knees    OTHER SURGICAL HISTORY Left 2-7-13    removal of hardware left hip left hip arthroplasty       Social History:    Social History     Tobacco Use    Smoking status: Never Smoker    Smokeless tobacco: Never Used   Substance Use Topics  Alcohol use: No                                Counseling given: Not Answered      Vital Signs (Current):   Vitals:    03/26/21 2354 03/27/21 0255 03/27/21 0330 03/27/21 0805   BP: (!) 151/75 (!) 140/67 122/76    Pulse: 65 78 80    Resp: 16 16 16    Temp: 97.6 °F (36.4 °C) 98.6 °F (37 °C) 98.5 °F (36.9 °C)    TempSrc:  Oral Oral    SpO2: 95%  95% 95%   Weight:       Height:    5' (1.524 m)                                              BP Readings from Last 3 Encounters:   03/27/21 122/76   12/08/20 (!) 143/62   11/13/20 105/63       NPO Status: Time of last liquid consumption: 0000                        Time of last solid consumption: 0000                        Date of last liquid consumption: 03/26/21                        Date of last solid food consumption: 03/26/21    BMI:   Wt Readings from Last 3 Encounters:   03/26/21 105 lb (47.6 kg)   12/08/20 105 lb (47.6 kg)   11/13/20 105 lb (47.6 kg)     Body mass index is 20.51 kg/m². CBC:   Lab Results   Component Value Date    WBC 18.3 03/27/2021    RBC 3.76 03/27/2021    HGB 12.0 03/27/2021    HCT 38.0 03/27/2021    .1 03/27/2021    RDW 13.6 03/27/2021     03/27/2021       CMP:   Lab Results   Component Value Date     03/27/2021    K 5.2 03/27/2021     03/27/2021    CO2 21 03/27/2021    BUN 22 03/27/2021    CREATININE 1.4 03/27/2021    GFRAA 44 03/27/2021    LABGLOM 36 03/27/2021    GLUCOSE 145 03/27/2021    GLUCOSE 167 06/10/2011    PROT 6.7 03/26/2021    CALCIUM 8.8 03/27/2021    BILITOT <0.2 03/26/2021    ALKPHOS 125 03/26/2021    AST 13 03/26/2021    ALT 8 03/26/2021       POC Tests: No results for input(s): POCGLU, POCNA, POCK, POCCL, POCBUN, POCHEMO, POCHCT in the last 72 hours.     Coags:   Lab Results   Component Value Date    PROTIME 12.0 05/19/2013    PROTIME 12.3 06/08/2011    INR 1.0 05/19/2013    APTT 30.3 02/01/2013       HCG (If Applicable): No results found for: PREGTESTUR, PREGSERUM, HCG, HCGQUANT     ABGs: No results found for: PHART, PO2ART, SBZ3RFR, KDO8UKI, BEART, X8UQDEKW     Type & Screen (If Applicable):  No results found for: LABABO, LABRH    Drug/Infectious Status (If Applicable):  No results found for: HIV, HEPCAB    COVID-19 Screening (If Applicable): No results found for: COVID19        Anesthesia Evaluation  Patient summary reviewed no history of anesthetic complications:   Airway: Mallampati: II  TM distance: >3 FB   Neck ROM: full  Mouth opening: > = 3 FB Dental:          Pulmonary:   (+) shortness of breath:      (-) not a current smoker                           Cardiovascular:    (+) CHF: no interval change, hyperlipidemia        Rhythm: regular  Rate: normal                    Neuro/Psych:   (+) TIA, headaches: migraine headaches,             GI/Hepatic/Renal:   (+) GERD:, PUD,          ROS comment: Nausea, abdominal pain, no vomiting today. Small bowel obstruction . Endo/Other:    (+) hypothyroidism: arthritis: OA., .                 Abdominal:         (-) obese     Vascular: negative vascular ROS. Anesthesia Plan      general     ASA 3       Induction: intravenous. MIPS: Postoperative opioids intended and Prophylactic antiemetics administered. Anesthetic plan and risks discussed with patient. Plan discussed with CRNA. DOS STAFF ADDENDUM:    Pt seen and examined, chart reviewed (including anesthesia, drug and allergy history). Anesthetic plan, risks, benefits, alternatives, and personnel involved discussed with patient. Patient verbalized an understanding and agrees to proceed. Plan discussed with care team members and agreed upon.     Racquel Rodrigues MD  Staff Anesthesiologist  12:37 PM      Racquel Rodrigues MD   3/27/2021

## 2021-03-27 NOTE — ED NOTES
Report called to floor, room in process of being cleaned.       [facility]  03/27/21 9282

## 2021-03-27 NOTE — H&P
General Surgery History and Physical  [facility]    Patient's Name/Date of Birth: Phi Ramos / 1945    Date: March 26, 2021     Surgeon: Rhea Rajput MD    PCP: Elfego Javed MD     Chief Complaint: abdominal pain    HPI:   Phi Ramos is a 67 y.o. female who presents for evaluation of several hours of abodminal pain which started abruptly earlier today. She has never had pain like this before. She also had nausea and dry heaving but no significant vomiting. She had had flatus and 1 episode of non bloody bowel movement today. In the ED, labs were wnl. She had CT abdomen/pel that revealed enteritis and colitis. Surgery was asked to evaluate further. She has had open hysterectomy many years ago as her only abdominal surgery.        Patient Active Problem List   Diagnosis    Arthritis, hip    Avascular necrosis of bone of hip ([address]    HLD (hyperlipidemia)    Migraine    PUD (peptic ulcer disease)    Hypothyroid    Transient cerebral ischemia    [place] (acute kidney injury) ([address]    Acute on chronic diastolic (congestive) heart failure (HCC)    CHF (congestive heart failure), NYHA class I, acute on chronic, combined (HCC)    Precordial pain    SOB (shortness of breath)    Enteritis       Past Medical History:   Diagnosis Date    Acute on chronic diastolic (congestive) heart failure ([address] 9/29/2020    Arthritis     GERD (gastroesophageal reflux disease)     H/O cardiovascular stress test 10/03/2020    Lexiscan    History of blood transfusion     Hyperlipidemia     Migraines     Thyroid disease        Past Surgical History:   Procedure Laterality Date    COLONOSCOPY      ENDOSCOPY, COLON, DIAGNOSTIC      FRACTURE SURGERY      left wrist, 2010-fx left hip    HYSTERECTOMY      JOINT REPLACEMENT      bilat knees    OTHER SURGICAL HISTORY Left 2-7-13    removal of hardware left hip left hip arthroplasty       Allergies   Allergen Reactions    Penicillins Hives and Rash       The patient has a family history that is negative for severe cardiovascular or respiratory issues, negative for reaction to anesthesia. Time spent reviewing past medical, surgical, social and family history, vitals, nursing assessment and images. No changes from above documented history. Social History     Socioeconomic History    Marital status:      Spouse name: Not on file    Number of children: Not on file    Years of education: Not on file    Highest education level: Not on file   Occupational History    Not on file   Social Needs    Financial resource strain: Not on file    Food insecurity     Worry: Not on file     Inability: Not on file    Transportation needs     Medical: Not on file     Non-medical: Not on file   Tobacco Use    Smoking status: Never Smoker    Smokeless tobacco: Never Used   Substance and Sexual Activity    Alcohol use: No    Drug use: No    Sexual activity: Not on file   Lifestyle    Physical activity     Days per week: Not on file     Minutes per session: Not on file    Stress: Not on file   Relationships    Social connections     Talks on phone: Not on file     Gets together: Not on file     Attends Voodoo service: Not on file     Active member of club or organization: Not on file     Attends meetings of clubs or organizations: Not on file     Relationship status: Not on file    Intimate partner violence     Fear of current or ex partner: Not on file     Emotionally abused: Not on file     Physically abused: Not on file     Forced sexual activity: Not on file   Other Topics Concern    Not on file   Social History Narrative    Not on file       I have reviewed relevant labs from this admission and interpretation is included in my assessment and plan    Review of Systems    A complete 10 system review was performed and are otherwise negative unless mentioned in the above HPI.  Specific negatives are listed below but may not include all those reviewed. General ROS: negative obtundation, AMS  ENT ROS: negative rhinorrhea, epistaxis  Allergy and Immunology ROS: negative itchy/watery eyes or nasal congestion  Hematological and Lymphatic ROS: negative spontaneous bleeding or bruising  Endocrine ROS: negative  lethargy, mood swings, palpitations or polydipsia/polyuria  Respiratory ROS: negative sputum changes, stridor, tachypnea or wheezing  Cardiovascular ROS: negative for - loss of consciousness, murmur or orthopnea  Gastrointestinal ROS: negative for - hematochezia or hematemesis  Genito-Urinary ROS: negative for -  genital discharge or hematuria  Musculoskeletal ROS: negative for - focal weakness, gangrene  Psych/Neuro ROS: negative for - visual or auditory hallucinations, suicidal ideation    Physical exam:   BP (!) 158/63   Pulse 58   Temp 98.1 °F (36.7 °C) (Oral)   Resp 16   Wt 105 lb (47.6 kg)   SpO2 96%   BMI 19.20 kg/m²   General appearance:  NAD, appears stated age  Head: NCAT, PERRLA, EOMI, red conjunctiva  Neck: supple, no masses, trachea midline  Lungs: Equal chest rise bilateral, no retractions, no wheezing  Heart: Reg rate  Abdomen: soft, tender periumbilical with voluntary guarding, no rebound, nondistended  Skin; warm and dry, no cyanosis  Gu: no cva tenderness  Extremities: atraumatic, no focal motor deficits, no open wounds  Psych: No tremor, visual hallucinations      Radiology: I reviewed relevant abdominal imaging from this admission and that available in the EMR including CT abd/pel from 03/26/21.  My assessment is edematous segment of small bowel in pelvis without pneumatosis    Assessment:  Jordon Landry is a 67 y.o. female with abdominal pain, enteritis, rule out closed loop SBO  Patient Active Problem List   Diagnosis    Arthritis, hip    Avascular necrosis of bone of hip (HCC)    HLD (hyperlipidemia)    Migraine    PUD (peptic ulcer disease)    Hypothyroid    Transient cerebral ischemia    [facility] (acute kidney injury) ([facility]    Acute on chronic diastolic (congestive) heart failure (HCC)    CHF (congestive heart failure), NYHA class I, acute on chronic, combined (HCC)    Precordial pain    SOB (shortness of breath)    Enteritis         Plan:  Admit to general floor  Repeat CT abdomen/pel with oral contrast  Hydrate  NPO  IV abx for colitis/enteritis  Consult Dr. James Oates for medical management    Alanna Rios MD  8:46 PM  3/26/2021

## 2021-03-27 NOTE — ANESTHESIA POSTPROCEDURE EVALUATION
Department of Anesthesiology  Postprocedure Note    Patient: Chilo King  MRN: [id number]  YOB: 1945  Date of evaluation: 3/27/2021  Time:  4:01 PM     Procedure Summary     Date: 03/27/21 Room / Location: [address] [place] [address] / [facility] [employer] [facility]    Anesthesia Start: 9598 Anesthesia Stop: 0190    Procedure: LAPAROSCOPIC CONVERT TO OPEN SMALL BOWEL RESECTION (N/A Abdomen) Diagnosis: (SMALL BOWEL OBSTRUCTION)    Surgeons: Ish Peterson MD Responsible Provider: Seun Allen MD    Anesthesia Type: general ASA Status: 3          Anesthesia Type: general    Kannan Phase I: Kannan Score: 9    Kannan Phase II:      Last vitals: Reviewed and per EMR flowsheets.        Anesthesia Post Evaluation    Patient location during evaluation: PACU  Patient participation: complete - patient participated  Level of consciousness: awake  Airway patency: patent  Nausea & Vomiting: no nausea and no vomiting  Complications: no  Cardiovascular status: hemodynamically stable  Respiratory status: acceptable  Hydration status: euvolemic

## 2021-03-27 NOTE — CONSULTS
Internal Medicine Progress Note    [facility]    Jerson Mane, SOY.ANANTOAneudyIAneudy Fritz D.O., DEBI Woodward, MSN, APRN, NP-C  Lazarus Leep. [facility], MSN, APRN-CNP     Primary Care Physician: Jazmin Chung MD   Admitting Physician:  Michael Cee MD  Admission date and time: 3/26/2021  2:36 PM    Room:  [address] [place], [place] [address]  Admitting diagnosis: Enteritis [K52.9]    Patient Name: Miguel Londono  MRN: [id number]    Date of Service: 3/27/2021     Subjective:  Dee Dyer is a 67 y.o. female who was seen and examined today,3/27/2021, at the bedside. Dee Dyer continues to have epigastric pain and nausea. Denies vomiting. Denies the passage of stool or flatus. Reviewed prior abdominal surgical history, hysterectomy in the past but no other extensive history reported. We reviewed the CT results and the plan moving forward with hydration and symptom management. No family present during my examination. Review of System:   Constitutional:   Denies fever or chills, weight loss or gain, admits to malaise  HEENT:   Denies ear pain, sore throat, sinus or eye problems. Cardiovascular:   Denies any chest pain, or palpitations. Respiratory:   Denies shortness of breath, coughing, sputum production, hemoptysis, or wheezing. Gastrointestinal:   Abdominal, most significant in the epigastric region. Nausea with no further vomiting. No flatus or bowel movement. Genitourinary:    Denies any urgency, frequency, hematuria. Voiding  without difficulty. Extremities:   Denies lower extremity swelling, edema or cyanosis. Neurology:    Denies any headache or focal neurological deficits, Denies generalized weakness or memory difficulty. Psch:   Denies being anxious or depressed. Musculoskeletal:    Denies  myalgias, joint complaints or back pain. Integumentary:   Denies any rashes, ulcers, or excoriations.   Denies bruising. Hematologic/Lymphatic:  Denies bruising or bleeding. Physical Exam:  No intake/output data recorded. Intake/Output Summary (Last 24 hours) at 3/27/2021 0718  Last data filed at 3/27/2021 0320  Gross per 24 hour   Intake 263 ml   Output    Net 263 ml   I/O last 3 completed shifts: In: 263 [I.V.:263]  Out: -   Patient Vitals for the past 96 hrs (Last 3 readings):   Weight   03/26/21 1434 105 lb (47.6 kg)     Vital Signs:   Blood pressure 122/76, pulse 80, temperature 98.5 °F (36.9 °C), temperature source Oral, resp. rate 16, height 5' 2\" (1.575 m), weight 105 lb (47.6 kg), SpO2 95 %. General appearance:  Alert, responsive, oriented to person, place, and time. Uncomfortable appearing but without distress. Head:  Normocephalic. No masses, lesions or tenderness. Eyes:  PERRLA. EOMI. Sclera clear. Buccal mucosa moist.  ENT:  Ears normal. Mucosa normal.  Neck:    Supple. Trachea midline. No thyromegaly. No JVD. No bruits. Heart:    Rhythm regular. Rate controlled. Systolic murmur. Lungs:    Symmetrical. Clear to auscultation bilaterally. No wheezes. No rhonchi. No rales. Abdomen:   Soft. Mild soft distention appreciated. Bowel sounds hypoactive in all 4 quadrants. Epigastric region is tender to palpation with voluntary guarding elicited. Extremities:    Peripheral pulses present. No peripheral edema. No ulcers. No cyanosis. No clubbing. Neurologic:    Alert x 3. No focal deficit. Cranial nerves grossly intact. No focal weakness. Psych:   Behavior is normal. Mood appears normal. Speech is not rapid and/or pressured. Musculoskeletal:   Spine ROM normal. Muscular strength intact. Gait not assessed. Integumentary:  No rashes  Skin normal color and texture.   Genitalia/Breast:  Deferred    Medication:  Scheduled Meds:   aspirin  81 mg Oral Daily    [Held by provider] atorvastatin  20 mg Oral Daily    [Held by provider] calcium carbonate  2 tablet Oral Daily    [Held by provider] ferrous sulfate  325 mg Oral BID     levothyroxine  75 mcg Oral Daily    metoprolol succinate  25 mg Oral Daily    [Held by provider] mirtazapine  30 mg Oral Nightly    [Held by provider] rOPINIRole  1 mg Oral Nightly    [Held by provider] sertraline  50 mg Oral Daily    [Held by provider] pyridoxine  50 mg Oral Daily    sodium chloride flush  10 mL Intravenous 2 times per day    enoxaparin  30 mg Subcutaneous Daily    metroNIDAZOLE  500 mg Intravenous Q8H    ciprofloxacin  400 mg Intravenous Q24H    pantoprazole  40 mg Intravenous Daily    And    sodium chloride (PF)  10 mL Intravenous Daily     Continuous Infusions:   sodium chloride      sodium chloride 100 mL/hr at 03/27/21 0030       Objective Data:  CBC with Differential:    Lab Results   Component Value Date    WBC 18.3 03/27/2021    RBC 3.76 03/27/2021    HGB 12.0 03/27/2021    HCT 38.0 03/27/2021     03/27/2021    .1 03/27/2021    MCH 31.9 03/27/2021    MCHC 31.6 03/27/2021    RDW 13.6 03/27/2021    NRBC 1.0 10/05/2020    SEGSPCT 62 02/09/2013    LYMPHOPCT 8.7 03/26/2021    MONOPCT 4.1 03/26/2021    MYELOPCT 1.0 10/05/2020    BASOPCT 0.4 03/26/2021    MONOSABS 0.40 03/26/2021    LYMPHSABS 0.86 03/26/2021    EOSABS 0.00 03/26/2021    BASOSABS 0.04 03/26/2021     BMP:    Lab Results   Component Value Date     03/27/2021    K 5.2 03/27/2021     03/27/2021    CO2 21 03/27/2021    BUN 22 03/27/2021    LABALBU 4.0 03/26/2021    LABALBU 3.5 06/09/2011    CREATININE 1.4 03/27/2021    CALCIUM 8.8 03/27/2021    GFRAA 44 03/27/2021    LABGLOM 36 03/27/2021    GLUCOSE 145 03/27/2021    GLUCOSE 167 06/10/2011       Assessment:  · Enteritis/colitis with small bowel obstruction  · Leukocytosis likely 2/2 #1  · Chronic compensated diastolic congestive heart failure with hyperdynamic function and ejection fraction of 80%  · Mild hyperkalemia in setting of chronic kidney disease stage III   · Iron deficiency anemia with concomitant anemia of chronic disease  · Hyperlipidemia   · Hypothyroidism   · Gastroesophageal reflux disease with hiatal hernia  · Moderate protein calorie malnutrition    Plan:   Rayna Pal was admitted due to abdominal pain with noted enteritis/colitis on imaging. Repeat CT scan with oral contrast did show what appeared to be closed-loop obstruction secondary to narrowed passages. Antibiotics are being administered at the discretion of the surgery team.  Leukocytosis did develop from yesterday to today and the patient continues to be quite uncomfortable. Close monitoring of serial abdominal examinations. Symptomatic and supportive care being employed. Gentle fluids are being given. Monitor the patient's volume status closely the setting of known but presently compensated diastolic congestive heart failure. Oral medications will be selectively held in the setting of obstruction. Chronic morbidities labs vital signs are being monitored and addressed accordingly otherwise. Continue current therapy. See orders for further plan of care. More than 50% of my time was spent at the bedside counseling/coordinating care with the patient and/or family with face to face contact. This time was spent reviewing notes and laboratory data as well as instructing and counseling the patient. Time I spent with the family or surrogate(s) is included only if the patient was incapable of providing the necessary information or participating in medical decisions. I also discussed the differential diagnosis and all of the proposed management plans with the patient and individuals accompanying the patient. I am readily available for any further decision-making and intervention.        Tao Heaton, NIC - CNP  3/27/2021  7:18 AM

## 2021-03-27 NOTE — PROGRESS NOTES
[facility],    Your patient is on a medication that requires a renal dose adjustment. Renal Function Assessment:    Date Body Weight IBW Adj. Body Weight SCr CrCl Dialysis status   3/26/2021 47.6 kg 50.1 kg N/A 1.3 27 ml/min K       Pharmacy has renally dose-adjusted the following medication(s):    Date Medication Original Dosing Regimen New Dosing Regimen   3/26/2021 Lovenox 40 mg QD 30 mg QD   3/26/2021 Ciprofloxacin 400 mg Q12h 400 mg QD     These changes were made per protocol according to the [facility] Renal Function-Based Dose Adjustments Policy    *Please note this dose may need readjusted if your patient's renal function significantly improves. Please contact pharmacy with any questions regarding these changes.

## 2021-03-28 LAB
ALBUMIN SERPL-MCNC: 2.7 G/DL (ref 3.5–5.2)
ALP BLD-CCNC: 85 U/L (ref 35–104)
ALT SERPL-CCNC: 6 U/L (ref 0–32)
ANION GAP SERPL CALCULATED.3IONS-SCNC: 11 MMOL/L (ref 7–16)
AST SERPL-CCNC: 13 U/L (ref 0–31)
BILIRUB SERPL-MCNC: 0.3 MG/DL (ref 0–1.2)
BUN BLDV-MCNC: 26 MG/DL (ref 8–23)
CALCIUM SERPL-MCNC: 8.1 MG/DL (ref 8.6–10.2)
CHLORIDE BLD-SCNC: 108 MMOL/L (ref 98–107)
CO2: 20 MMOL/L (ref 22–29)
CREAT SERPL-MCNC: 1.4 MG/DL (ref 0.5–1)
FERRITIN: 110 NG/ML
GFR AFRICAN AMERICAN: 44
GFR NON-AFRICAN AMERICAN: 36 ML/MIN/1.73
GLUCOSE BLD-MCNC: 123 MG/DL (ref 74–99)
HCT VFR BLD CALC: 25 % (ref 34–48)
HCT VFR BLD CALC: 25.5 % (ref 34–48)
HCT VFR BLD CALC: 26.4 % (ref 34–48)
HEMOGLOBIN: 8 G/DL (ref 11.5–15.5)
HEMOGLOBIN: 8.4 G/DL (ref 11.5–15.5)
HEMOGLOBIN: 8.5 G/DL (ref 11.5–15.5)
IRON SATURATION: 16 % (ref 15–50)
IRON: 18 MCG/DL (ref 37–145)
MAGNESIUM: 1.8 MG/DL (ref 1.6–2.6)
MCH RBC QN AUTO: 31.9 PG (ref 26–35)
MCHC RBC AUTO-ENTMCNC: 31.8 % (ref 32–34.5)
MCV RBC AUTO: 100.4 FL (ref 80–99.9)
PDW BLD-RTO: 13.7 FL (ref 11.5–15)
PHOSPHORUS: 2.4 MG/DL (ref 2.5–4.5)
PLATELET # BLD: 201 E9/L (ref 130–450)
PMV BLD AUTO: 10.7 FL (ref 7–12)
POTASSIUM SERPL-SCNC: 4.1 MMOL/L (ref 3.5–5)
RBC # BLD: 2.63 E12/L (ref 3.5–5.5)
SODIUM BLD-SCNC: 139 MMOL/L (ref 132–146)
TOTAL IRON BINDING CAPACITY: 113 MCG/DL (ref 250–450)
TOTAL PROTEIN: 5.1 G/DL (ref 6.4–8.3)
WBC # BLD: 14.2 E9/L (ref 4.5–11.5)

## 2021-03-28 PROCEDURE — 83550 IRON BINDING TEST: CPT

## 2021-03-28 PROCEDURE — 1200000000 HC SEMI PRIVATE

## 2021-03-28 PROCEDURE — 85027 COMPLETE CBC AUTOMATED: CPT

## 2021-03-28 PROCEDURE — 84100 ASSAY OF PHOSPHORUS: CPT

## 2021-03-28 PROCEDURE — 85014 HEMATOCRIT: CPT

## 2021-03-28 PROCEDURE — 82728 ASSAY OF FERRITIN: CPT

## 2021-03-28 PROCEDURE — 83735 ASSAY OF MAGNESIUM: CPT

## 2021-03-28 PROCEDURE — 99024 POSTOP FOLLOW-UP VISIT: CPT | Performed by: SURGERY

## 2021-03-28 PROCEDURE — 36415 COLL VENOUS BLD VENIPUNCTURE: CPT

## 2021-03-28 PROCEDURE — 80053 COMPREHEN METABOLIC PANEL: CPT

## 2021-03-28 PROCEDURE — 2580000003 HC RX 258: Performed by: SURGERY

## 2021-03-28 PROCEDURE — 85018 HEMOGLOBIN: CPT

## 2021-03-28 PROCEDURE — C9113 INJ PANTOPRAZOLE SODIUM, VIA: HCPCS | Performed by: SURGERY

## 2021-03-28 PROCEDURE — 83540 ASSAY OF IRON: CPT

## 2021-03-28 PROCEDURE — 2500000003 HC RX 250 WO HCPCS: Performed by: SURGERY

## 2021-03-28 PROCEDURE — 6360000002 HC RX W HCPCS: Performed by: SURGERY

## 2021-03-28 RX ORDER — HYDROMORPHONE HYDROCHLORIDE 1 MG/ML
0.5 INJECTION, SOLUTION INTRAMUSCULAR; INTRAVENOUS; SUBCUTANEOUS ONCE
Status: COMPLETED | OUTPATIENT
Start: 2021-03-28 | End: 2021-03-28

## 2021-03-28 RX ADMIN — SODIUM CHLORIDE, PRESERVATIVE FREE 10 ML: 5 INJECTION INTRAVENOUS at 08:12

## 2021-03-28 RX ADMIN — MORPHINE SULFATE 2 MG: 2 INJECTION, SOLUTION INTRAMUSCULAR; INTRAVENOUS at 15:01

## 2021-03-28 RX ADMIN — MORPHINE SULFATE 2 MG: 2 INJECTION, SOLUTION INTRAMUSCULAR; INTRAVENOUS at 23:32

## 2021-03-28 RX ADMIN — MORPHINE SULFATE 2 MG: 2 INJECTION, SOLUTION INTRAMUSCULAR; INTRAVENOUS at 11:16

## 2021-03-28 RX ADMIN — SODIUM CHLORIDE: 9 INJECTION, SOLUTION INTRAVENOUS at 03:34

## 2021-03-28 RX ADMIN — PANTOPRAZOLE SODIUM 40 MG: 40 INJECTION, POWDER, FOR SOLUTION INTRAVENOUS at 08:12

## 2021-03-28 RX ADMIN — METRONIDAZOLE 500 MG: 500 INJECTION, SOLUTION INTRAVENOUS at 23:30

## 2021-03-28 RX ADMIN — SODIUM CHLORIDE, PRESERVATIVE FREE 10 ML: 5 INJECTION INTRAVENOUS at 19:35

## 2021-03-28 RX ADMIN — MORPHINE SULFATE 2 MG: 2 INJECTION, SOLUTION INTRAMUSCULAR; INTRAVENOUS at 03:37

## 2021-03-28 RX ADMIN — SODIUM CHLORIDE: 9 INJECTION, SOLUTION INTRAVENOUS at 15:02

## 2021-03-28 RX ADMIN — METRONIDAZOLE 500 MG: 500 INJECTION, SOLUTION INTRAVENOUS at 15:02

## 2021-03-28 RX ADMIN — METRONIDAZOLE 500 MG: 500 INJECTION, SOLUTION INTRAVENOUS at 08:11

## 2021-03-28 RX ADMIN — MORPHINE SULFATE 2 MG: 2 INJECTION, SOLUTION INTRAMUSCULAR; INTRAVENOUS at 08:12

## 2021-03-28 RX ADMIN — HYDROMORPHONE HYDROCHLORIDE 0.5 MG: 1 INJECTION, SOLUTION INTRAMUSCULAR; INTRAVENOUS; SUBCUTANEOUS at 17:45

## 2021-03-28 ASSESSMENT — PAIN SCALES - GENERAL
PAINLEVEL_OUTOF10: 9
PAINLEVEL_OUTOF10: 0
PAINLEVEL_OUTOF10: 3
PAINLEVEL_OUTOF10: 7
PAINLEVEL_OUTOF10: 9
PAINLEVEL_OUTOF10: 8
PAINLEVEL_OUTOF10: 6
PAINLEVEL_OUTOF10: 9
PAINLEVEL_OUTOF10: 6
PAINLEVEL_OUTOF10: 10
PAINLEVEL_OUTOF10: 0
PAINLEVEL_OUTOF10: 9
PAINLEVEL_OUTOF10: 8
PAINLEVEL_OUTOF10: 7

## 2021-03-28 ASSESSMENT — PAIN DESCRIPTION - LOCATION
LOCATION: ABDOMEN

## 2021-03-28 ASSESSMENT — PAIN DESCRIPTION - PAIN TYPE
TYPE: SURGICAL PAIN

## 2021-03-28 NOTE — PROGRESS NOTES
General Surgery Progress Note  [facility]    Patient's Name/Date of Birth: Rafy Pringle / 1945    Date: March 28, 2021     Surgeon: Hosea Bishop MD    Chief Complaint: Closed loop obstruction    Patient Active Problem List   Diagnosis    Arthritis, hip    Avascular necrosis of bone of hip ([address]    HLD (hyperlipidemia)    Migraine    PUD (peptic ulcer disease)    Hypothyroid    Transient cerebral ischemia    [place] (acute kidney injury) ([address]    Acute on chronic diastolic (congestive) heart failure ([address]    CHF (congestive heart failure), NYHA class I, acute on chronic, combined (HCC)    Precordial pain    SOB (shortness of breath)    Enteritis    Small bowel obstruction ([address]       Subjective: Feels mildly better today. Pain from prior to surgery has resolved however now having incisional pain. Denies nausea. No flatus or BM.     Objective:  BP (!) 144/67   Pulse 90   Temp 98.1 °F (36.7 °C) (Oral)   Resp 18   Ht 5' (1.524 m)   Wt 105 lb (47.6 kg)   SpO2 92%   BMI 20.51 kg/m²   Labs:  Recent Labs     03/26/21  1601 03/27/21  0537 03/28/21  0656 03/28/21  1149   WBC 9.9 18.3* 14.2*  --    HGB 12.3 12.0 8.4* 8.5*   HCT 37.0 38.0 26.4* 25.5*     Lab Results   Component Value Date    CREATININE 1.4 (H) 03/28/2021    BUN 26 (H) 03/28/2021     03/28/2021    K 4.1 03/28/2021     (H) 03/28/2021    CO2 20 (L) 03/28/2021     Recent Labs     03/26/21  1601   LIPASE 15     CBC:   Lab Results   Component Value Date    WBC 14.2 03/28/2021    RBC 2.63 03/28/2021    HGB 8.5 03/28/2021    HCT 25.5 03/28/2021    .4 03/28/2021    MCH 31.9 03/28/2021    MCHC 31.8 03/28/2021    RDW 13.7 03/28/2021     03/28/2021    MPV 10.7 03/28/2021     CMP:    Lab Results   Component Value Date     03/28/2021    K 4.1 03/28/2021    K 5.2 03/27/2021     03/28/2021    CO2 20 03/28/2021    BUN 26 03/28/2021    CREATININE 1.4 03/28/2021    GFRAA 44 03/28/2021    LABGLOM 36 03/28/2021    GLUCOSE 123 03/28/2021    GLUCOSE 167 06/10/2011    PROT 5.1 03/28/2021    LABALBU 2.7 03/28/2021    LABALBU 3.5 06/09/2011    CALCIUM 8.1 03/28/2021    BILITOT 0.3 03/28/2021    ALKPHOS 85 03/28/2021    AST 13 03/28/2021    ALT 6 03/28/2021       General appearance:  NAD  Head: NCAT, PERRLA, EOMI, red conjunctiva  Neck: supple, no masses  Lungs: CTAB, equal chest rise bilateral  Heart: Reg rate  Abdomen: soft, nondistended, tender appropriately, incision C/D/I  Skin; no lesions  Gu: no cva tenderness  Extremities: extremities normal, atraumatic, no cyanosis or edema      Assessment/Plan:  Elroy Hurtado is a 67 y.o. female postop day 1 laparoscopic small bowel resection for closed-loop obstruction    Ice chips and sips of clears  Pain control  Out of bed ambulate  Remove Watson catheter once urine output picks up  Await return of bowel function    Juan Alonso MD  3/28/2021  12:35 PM

## 2021-03-28 NOTE — PROGRESS NOTES
Internal Medicine Progress Note    [facility]    Shahbazramy Negro. Ashlie Wright., SOY.ROZINA.NAWAFOAneudyI. Becky Meyer D.O., SJOAneudyIAneudy Borjas D.O. Conrado Ormond, MSN, APRN, NP-C  Leesa Dunaway. Alma Amor, MSN, APRN-CNP     Primary Care Physician: Pilar Jorge MD   Admitting Physician:  Vicenta Cruz MD  Admission date and time: 3/26/2021  2:36 PM    Room:  [address] [place] [address]  Admitting diagnosis: Enteritis [K52.9]    Patient Name: Dinah Espinozar  MRN: [id number]    Date of Service: 3/28/2021     Subjective:  Cirilo Iverson is a 67 y.o. female who was seen and examined today,3/28/2021, at the bedside. She underwent laparoscopic converted to open small bowel resection by Dr. Pilar Monk yesterday and tolerated well. She has postoperative abdominal discomfort as to be expected but overall is feeling better. She has not yet passed flatus or had bowel movement. She is currently tolerating ice chips well and is requesting dietary advancement. Discussed the need to become more active, more ambulatory and to use incentive spirometer as she has developed a mild hypoxic postoperative respiratory failure. No family present during my examination. Review of System:   Constitutional:   Denies fever or chills, weight loss or gain, fatigue or malaise. HEENT:   Denies ear pain, sore throat, sinus or eye problems. Cardiovascular:   Denies any chest pain, irregular heartbeats, or palpitations. Respiratory:   Denies shortness of breath, coughing, sputum production, hemoptysis, or wheezing. Gastrointestinal:   Postoperative abdominal pain as to be expected without nausea or vomiting. Not yet passed flatus or had bowel movement postoperatively. Genitourinary:    Denies any urgency, frequency, hematuria. Voiding  without difficulty. Extremities:   Denies lower extremity swelling, edema or cyanosis.    Neurology:    Denies any headache or focal neurological deficits, Denies generalized weakness or memory difficulty. Psch:   Denies being anxious or depressed. Musculoskeletal:    Denies  myalgias, joint complaints or back pain. Integumentary:   Denies any rashes, ulcers, or excoriations. Denies bruising. Hematologic/Lymphatic:  Denies bruising or bleeding. Physical Exam:  No intake/output data recorded. Intake/Output Summary (Last 24 hours) at 3/28/2021 0929  Last data filed at 3/28/2021 0435  Gross per 24 hour   Intake 5703 ml   Output 565 ml   Net 5138 ml   I/O last 3 completed shifts: In: 80 [P.O.:3; I.V.:5700]  Out: 565 [Urine:530; Blood:35]  Patient Vitals for the past 96 hrs (Last 3 readings):   Weight   03/26/21 1434 105 lb (47.6 kg)     Vital Signs:   Blood pressure (!) 144/67, pulse 90, temperature 98.1 °F (36.7 °C), temperature source Oral, resp. rate 18, height 5' (1.524 m), weight 105 lb (47.6 kg), SpO2 92 %. General appearance:  Alert, responsive, oriented to person, place, and time. Comfortable, no distress. Head:  Normocephalic. No masses, lesions or tenderness. Eyes:  PERRLA. EOMI. Sclera clear. Buccal mucosa moist.  ENT:  Ears normal. Mucosa normal.  Neck:    Supple. Trachea midline. No thyromegaly. No JVD. No bruits. Heart:    Rhythm regular. Rate controlled. S1 and S2, systolic murmur  Lungs:    Symmetrical.  Diminished bibasilar. Clear to auscultation bilaterally. No wheezes. No rhonchi. No rales. Abdomen:   Soft. Nondistended unremarkable postoperative appearance with expected degree of tenderness to palpation. Bowel sounds are present and hypoactive in the upper portions of the abdomen and more hypoactive in the lower quadrants Extremities:    Peripheral pulses present. No peripheral edema. No ulcers. No cyanosis. No clubbing. Neurologic:    Alert x 3. No focal deficit. Cranial nerves grossly intact. No focal weakness. Psych:   Behavior is normal. Mood appears normal. Speech is not rapid and/or pressured.   Musculoskeletal:   Spine ROM normal. Muscular strength intact. Gait not assessed. Integumentary:  No rashes  Skin normal color and texture.   Genitalia/Breast:  Deferred    Medication:  Scheduled Meds:   aspirin  81 mg Oral Daily    [Held by provider] atorvastatin  20 mg Oral Daily    [Held by provider] calcium carbonate  2 tablet Oral Daily    [Held by provider] ferrous sulfate  325 mg Oral BID WC    levothyroxine  75 mcg Oral Daily    metoprolol succinate  25 mg Oral Daily    [Held by provider] mirtazapine  30 mg Oral Nightly    [Held by provider] rOPINIRole  1 mg Oral Nightly    [Held by provider] sertraline  50 mg Oral Daily    [Held by provider] pyridoxine  50 mg Oral Daily    sodium chloride flush  10 mL Intravenous 2 times per day    enoxaparin  30 mg Subcutaneous Daily    metroNIDAZOLE  500 mg Intravenous Q8H    ciprofloxacin  400 mg Intravenous Q24H    pantoprazole  40 mg Intravenous Daily    And    sodium chloride (PF)  10 mL Intravenous Daily     Continuous Infusions:   sodium chloride      sodium chloride 100 mL/hr at 03/28/21 0334       Objective Data:  CBC with Differential:    Lab Results   Component Value Date    WBC 14.2 03/28/2021    RBC 2.63 03/28/2021    HGB 8.4 03/28/2021    HCT 26.4 03/28/2021     03/28/2021    .4 03/28/2021    MCH 31.9 03/28/2021    MCHC 31.8 03/28/2021    RDW 13.7 03/28/2021    NRBC 1.0 10/05/2020    SEGSPCT 62 02/09/2013    LYMPHOPCT 8.7 03/26/2021    MONOPCT 4.1 03/26/2021    MYELOPCT 1.0 10/05/2020    BASOPCT 0.4 03/26/2021    MONOSABS 0.40 03/26/2021    LYMPHSABS 0.86 03/26/2021    EOSABS 0.00 03/26/2021    BASOSABS 0.04 03/26/2021     BMP:    Lab Results   Component Value Date     03/28/2021    K 4.1 03/28/2021    K 5.2 03/27/2021     03/28/2021    CO2 20 03/28/2021    BUN 26 03/28/2021    LABALBU 2.7 03/28/2021    LABALBU 3.5 06/09/2011    CREATININE 1.4 03/28/2021    CALCIUM 8.1 03/28/2021    GFRAA 44 03/28/2021    LABGLOM 36 03/28/2021    GLUCOSE 123 03/28/2021 GLUCOSE 167 06/10/2011       Assessment:  · Enteritis/colitis with small bowel obstruction status post laparoscopic converted to open small bowel resection  · Leukocytosis likely 2/2 #1  · Chronic compensated diastolic congestive heart failure with hyperdynamic function and ejection fraction of 80%  · Mild hyperkalemia in setting of chronic kidney disease stage III   · Iron deficiency anemia with concomitant anemia of chronic disease  · Hyperlipidemia   · Hypothyroidism   · Gastroesophageal reflux disease with hiatal hernia  · Moderate protein calorie malnutrition    Plan:   Emma tolerated surgery well yesterday and we are awaiting return of bowel function. Dietary advancement as per the surgery team as well as symptomatic and supportive care. We will continue with IV fluid hydration until oral intake is consistent and adequate. In the setting of mild postoperative respiratory failure we have discussed increasing activity as this will benefit her bowel return as well and also the use of incentive spirometry and she is given instructions on appropriate technique at bedside. Hyperkalemia has resolved and renal function remained stable. Hemoglobin has trended downward in the setting of fluid resuscitation and recent surgery and we will cycle hemoglobin hematocrit and transfuse to maintain hemoglobin greater than 7. Chronic morbidities labs and vital signs are being monitored address accordingly. Continue current therapy. See orders for further plan of care. More than 50% of my  time was spent at the bedside counseling/coordinating care with the patient and/or family with face to face contact. This time was spent reviewing notes and laboratory data as well as instructing and counseling the patient. Time I spent with the family or surrogate(s) is included only if the patient was incapable of providing the necessary information or participating in medical decisions.  I also discussed the differential diagnosis and all of the proposed management plans with the patient and individuals accompanying the patient. [place] requires this high level of physician care and nursing on the IMC/Telemetry unit due the complexity of decision management and chance of rapid decline or death. Continued cardiac monitoring and higher level of nursing are required. I am readily available for any further decision-making and intervention.      Claudell Cha, APRN - CNP  3/28/2021  9:29 AM

## 2021-03-28 NOTE — PLAN OF CARE
Problem: Pain:  Goal: Pain level will decrease  Description: Pain level will decrease  3/28/2021 0146 by Chris Silvestre RN  Outcome: Met This Shift  3/27/2021 2053 by Kallie Armstrong RN  Outcome: Met This Shift  Goal: Control of acute pain  Description: Control of acute pain  3/28/2021 0146 by Chris Silvestre RN  Outcome: Met This Shift  3/27/2021 2053 by Kallie Armstrong RN  Outcome: Met This Shift  Goal: Control of chronic pain  Description: Control of chronic pain  3/28/2021 0146 by Chris Silvestre RN  Outcome: Met This Shift  3/27/2021 2053 by Kallie Armstrong RN  Outcome: Met This Shift

## 2021-03-29 ENCOUNTER — APPOINTMENT (OUTPATIENT)
Dept: CT IMAGING | Age: 76
DRG: 853 | End: 2021-03-29
Payer: MEDICARE

## 2021-03-29 LAB
ALBUMIN SERPL-MCNC: 2.5 G/DL (ref 3.5–5.2)
ALP BLD-CCNC: 63 U/L (ref 35–104)
ALT SERPL-CCNC: 7 U/L (ref 0–32)
ANION GAP SERPL CALCULATED.3IONS-SCNC: 12 MMOL/L (ref 7–16)
AST SERPL-CCNC: 14 U/L (ref 0–31)
BILIRUB SERPL-MCNC: <0.2 MG/DL (ref 0–1.2)
BUN BLDV-MCNC: 29 MG/DL (ref 8–23)
CALCIUM SERPL-MCNC: 8.1 MG/DL (ref 8.6–10.2)
CHLORIDE BLD-SCNC: 110 MMOL/L (ref 98–107)
CO2: 18 MMOL/L (ref 22–29)
CREAT SERPL-MCNC: 1.3 MG/DL (ref 0.5–1)
GFR AFRICAN AMERICAN: 48
GFR NON-AFRICAN AMERICAN: 40 ML/MIN/1.73
GLUCOSE BLD-MCNC: 85 MG/DL (ref 74–99)
HCT VFR BLD CALC: 24.5 % (ref 34–48)
HEMOGLOBIN: 7.7 G/DL (ref 11.5–15.5)
LACTIC ACID: 0.6 MMOL/L (ref 0.5–2.2)
MCH RBC QN AUTO: 32 PG (ref 26–35)
MCHC RBC AUTO-ENTMCNC: 31.4 % (ref 32–34.5)
MCV RBC AUTO: 101.7 FL (ref 80–99.9)
PDW BLD-RTO: 13.5 FL (ref 11.5–15)
PLATELET # BLD: 201 E9/L (ref 130–450)
PMV BLD AUTO: 10.5 FL (ref 7–12)
POTASSIUM SERPL-SCNC: 3.8 MMOL/L (ref 3.5–5)
RBC # BLD: 2.41 E12/L (ref 3.5–5.5)
SODIUM BLD-SCNC: 140 MMOL/L (ref 132–146)
TOTAL PROTEIN: 4.8 G/DL (ref 6.4–8.3)
WBC # BLD: 10.5 E9/L (ref 4.5–11.5)

## 2021-03-29 PROCEDURE — 6360000002 HC RX W HCPCS: Performed by: STUDENT IN AN ORGANIZED HEALTH CARE EDUCATION/TRAINING PROGRAM

## 2021-03-29 PROCEDURE — C9113 INJ PANTOPRAZOLE SODIUM, VIA: HCPCS | Performed by: SURGERY

## 2021-03-29 PROCEDURE — 6360000002 HC RX W HCPCS: Performed by: INTERNAL MEDICINE

## 2021-03-29 PROCEDURE — 36415 COLL VENOUS BLD VENIPUNCTURE: CPT

## 2021-03-29 PROCEDURE — 6370000000 HC RX 637 (ALT 250 FOR IP): Performed by: SURGERY

## 2021-03-29 PROCEDURE — 1200000000 HC SEMI PRIVATE

## 2021-03-29 PROCEDURE — 74176 CT ABD & PELVIS W/O CONTRAST: CPT

## 2021-03-29 PROCEDURE — 6360000002 HC RX W HCPCS: Performed by: SURGERY

## 2021-03-29 PROCEDURE — 80053 COMPREHEN METABOLIC PANEL: CPT

## 2021-03-29 PROCEDURE — 2500000003 HC RX 250 WO HCPCS: Performed by: SURGERY

## 2021-03-29 PROCEDURE — 85027 COMPLETE CBC AUTOMATED: CPT

## 2021-03-29 PROCEDURE — 2580000003 HC RX 258: Performed by: SURGERY

## 2021-03-29 PROCEDURE — 83605 ASSAY OF LACTIC ACID: CPT

## 2021-03-29 RX ORDER — OXYCODONE HYDROCHLORIDE 5 MG/1
5 TABLET ORAL EVERY 4 HOURS PRN
Status: DISCONTINUED | OUTPATIENT
Start: 2021-03-29 | End: 2021-04-01

## 2021-03-29 RX ORDER — ACETAMINOPHEN 500 MG
1000 TABLET ORAL EVERY 8 HOURS SCHEDULED
Status: DISCONTINUED | OUTPATIENT
Start: 2021-03-29 | End: 2021-04-01

## 2021-03-29 RX ORDER — POTASSIUM CHLORIDE AND SODIUM CHLORIDE 900; 300 MG/100ML; MG/100ML
INJECTION, SOLUTION INTRAVENOUS CONTINUOUS
Status: DISCONTINUED | OUTPATIENT
Start: 2021-03-29 | End: 2021-03-31

## 2021-03-29 RX ORDER — HYDROMORPHONE HYDROCHLORIDE 1 MG/ML
0.25 INJECTION, SOLUTION INTRAMUSCULAR; INTRAVENOUS; SUBCUTANEOUS
Status: DISCONTINUED | OUTPATIENT
Start: 2021-03-29 | End: 2021-03-29

## 2021-03-29 RX ORDER — LIDOCAINE 4 G/G
1 PATCH TOPICAL DAILY
Status: DISCONTINUED | OUTPATIENT
Start: 2021-03-30 | End: 2021-04-03 | Stop reason: HOSPADM

## 2021-03-29 RX ORDER — HYDROMORPHONE HYDROCHLORIDE 1 MG/ML
0.5 INJECTION, SOLUTION INTRAMUSCULAR; INTRAVENOUS; SUBCUTANEOUS
Status: DISCONTINUED | OUTPATIENT
Start: 2021-03-29 | End: 2021-03-29

## 2021-03-29 RX ORDER — HYDROMORPHONE HYDROCHLORIDE 1 MG/ML
0.25 INJECTION, SOLUTION INTRAMUSCULAR; INTRAVENOUS; SUBCUTANEOUS
Status: DISCONTINUED | OUTPATIENT
Start: 2021-03-29 | End: 2021-04-01

## 2021-03-29 RX ORDER — HYDROMORPHONE HYDROCHLORIDE 1 MG/ML
0.5 INJECTION, SOLUTION INTRAMUSCULAR; INTRAVENOUS; SUBCUTANEOUS
Status: DISCONTINUED | OUTPATIENT
Start: 2021-03-29 | End: 2021-04-01

## 2021-03-29 RX ADMIN — HYDROMORPHONE HYDROCHLORIDE 0.5 MG: 1 INJECTION, SOLUTION INTRAMUSCULAR; INTRAVENOUS; SUBCUTANEOUS at 20:15

## 2021-03-29 RX ADMIN — POTASSIUM CHLORIDE AND SODIUM CHLORIDE: 900; 300 INJECTION, SOLUTION INTRAVENOUS at 09:34

## 2021-03-29 RX ADMIN — METRONIDAZOLE 500 MG: 500 INJECTION, SOLUTION INTRAVENOUS at 06:41

## 2021-03-29 RX ADMIN — LEVOTHYROXINE SODIUM 75 MCG: 75 TABLET ORAL at 07:59

## 2021-03-29 RX ADMIN — HYDROMORPHONE HYDROCHLORIDE 0.5 MG: 1 INJECTION, SOLUTION INTRAMUSCULAR; INTRAVENOUS; SUBCUTANEOUS at 11:05

## 2021-03-29 RX ADMIN — SODIUM CHLORIDE, PRESERVATIVE FREE 10 ML: 5 INJECTION INTRAVENOUS at 08:00

## 2021-03-29 RX ADMIN — ENOXAPARIN SODIUM 30 MG: 30 INJECTION SUBCUTANEOUS at 08:00

## 2021-03-29 RX ADMIN — SODIUM CHLORIDE: 9 INJECTION, SOLUTION INTRAVENOUS at 03:55

## 2021-03-29 RX ADMIN — MORPHINE SULFATE 2 MG: 2 INJECTION, SOLUTION INTRAMUSCULAR; INTRAVENOUS at 03:57

## 2021-03-29 RX ADMIN — OXYCODONE 5 MG: 5 TABLET ORAL at 23:00

## 2021-03-29 RX ADMIN — CIPROFLOXACIN 400 MG: 2 INJECTION, SOLUTION INTRAVENOUS at 00:30

## 2021-03-29 RX ADMIN — ACETAMINOPHEN 1000 MG: 500 TABLET ORAL at 23:01

## 2021-03-29 RX ADMIN — PANTOPRAZOLE SODIUM 40 MG: 40 INJECTION, POWDER, FOR SOLUTION INTRAVENOUS at 07:59

## 2021-03-29 RX ADMIN — ASPIRIN 81 MG: 81 TABLET, CHEWABLE ORAL at 07:59

## 2021-03-29 RX ADMIN — METOPROLOL SUCCINATE 25 MG: 25 TABLET, EXTENDED RELEASE ORAL at 08:00

## 2021-03-29 RX ADMIN — HYDROMORPHONE HYDROCHLORIDE 0.5 MG: 1 INJECTION, SOLUTION INTRAMUSCULAR; INTRAVENOUS; SUBCUTANEOUS at 08:01

## 2021-03-29 RX ADMIN — HYDROMORPHONE HYDROCHLORIDE 0.5 MG: 1 INJECTION, SOLUTION INTRAMUSCULAR; INTRAVENOUS; SUBCUTANEOUS at 16:13

## 2021-03-29 ASSESSMENT — PAIN SCALES - GENERAL
PAINLEVEL_OUTOF10: 10
PAINLEVEL_OUTOF10: 9
PAINLEVEL_OUTOF10: 10
PAINLEVEL_OUTOF10: 10
PAINLEVEL_OUTOF10: 5
PAINLEVEL_OUTOF10: 8
PAINLEVEL_OUTOF10: 9
PAINLEVEL_OUTOF10: 10
PAINLEVEL_OUTOF10: 10
PAINLEVEL_OUTOF10: 9

## 2021-03-29 ASSESSMENT — PAIN DESCRIPTION - PAIN TYPE
TYPE: SURGICAL PAIN
TYPE: SURGICAL PAIN

## 2021-03-29 ASSESSMENT — PAIN DESCRIPTION - LOCATION
LOCATION: ABDOMEN
LOCATION: ABDOMEN

## 2021-03-29 NOTE — PROGRESS NOTES
Physician Progress Note      Stephanie Michel  CSN #:                  [id number]  :                       1945  ADMIT DATE:       3/26/2021 2:36 PM  [address] [place] [address] DATE:  RESPONDING  PROVIDER #:        Any Hendrickson DO          QUERY TEXT:    Dear Attending Provider,    Pt admitted with . Pt noted to have Enteritis/colitis and necrotic small bowel   in Op note. If possible, please document in the progress notes and discharge   summary if you are evaluating and /or treating any of the following: The medical record reflects the following:  Risk Factors include: Enteritis/colitis, necrotic small bowel,  Clinical Indicators include:3/27:WBC-18.3, Max Temp-100.9,  Per Operative   note:. .necrotic small bowel. Treatment includes: IVF, cipro iv, flagyl iv, lab monitoring and assessments. Thank you,  Rubens Amin, RN, BSN, CCDS  [phone]  Options provided:  -- Sepsis, present on admission  -- Sepsis, not present on admission  -- No Sepsis  -- Sepsis was ruled out  -- Other - I will add my own diagnosis  -- Disagree - Not applicable / Not valid  -- Disagree - Clinically unable to determine / Unknown  -- Refer to Clinical Documentation Reviewer    PROVIDER RESPONSE TEXT:    This patient has sepsis which was present on admission. Query created by:  Mckenna Solorzano on 3/29/2021 1:57 PM      Electronically signed by:  Any Hendrickson DO 3/29/2021 2:03 PM

## 2021-03-29 NOTE — PROGRESS NOTES
GENERAL SURGERY  DAILY PROGRESS NOTE  3/29/2021    Chief Complaint   Patient presents with    Abdominal Pain     starting a few hours ago,  Upper Middle abdomen.  Nausea     when pain started    Excessive Sweating     when pain started        Subjective:  Feels ok. Complaining of significant pain at her midline that is not helped by the morphine but was improved with the one time dose of dilaudid yesterday. No flatus or BM yet.  Some nausea but no emesis    Objective:  BP (!) 125/57   Pulse 73   Temp 97.8 °F (36.6 °C) (Oral)   Resp 16   Ht 5' (1.524 m)   Wt 105 lb (47.6 kg)   SpO2 95%   BMI 20.51 kg/m²     GENERAL:  Laying in bed, awake, alert, cooperative, no apparent distress  LUNGS:  No increased work of breathing  CARDIOVASCULAR:  RR  ABDOMEN:  Soft, slightly distended, TTP along midline incision, CDI  EXTREMITIES: No edema or swelling  SKIN: Warm and dry    Assessment/Plan:  67 y.o. female sp 3/27 Dx lap > ex lap, SBR for internal hernia and necrotic small bowel    NPO, sips, ice, popsicles  IVF  PRN pain medication- changed to dilaudid   PTOT- OOB, ambulate  Remove march  Start DVT ppx  Await bowel function       Electronically signed by David Rubin DO on 3/29/2021 at 7:42 AM    As above  Await labs today

## 2021-03-29 NOTE — CARE COORDINATION
Ss note:3/29/2021 11:50 AM No covid testing. Met w/ pt for transition of care needs. Pt relays she resides with her son Kam Rick [phone] whom is home with pt most of the time. Home is two story 5 steps to enter. Pt has a wheelchair & a cane. PTA pt was driving, does own home making chores, does NOT wear home 02, currently on 2 liters, nursing is attempting to wean. No hx of SNF or HCC. Utilizes [facility] in [place]. Will await therapy evals, pt plans to return home and relays she would be agreeable to [facility] if recommended. SW will follow.  Mardy Gowers, LSW

## 2021-03-29 NOTE — PLAN OF CARE
Problem: Pain:  Goal: Pain level will decrease  Description: Pain level will decrease  3/29/2021 0352 by Calixto Herrera, RN  Outcome: Met This Shift  3/28/2021 2022 by Jcarlos Ahumada RN  Outcome: Met This Shift

## 2021-03-29 NOTE — PROGRESS NOTES
Internal Medicine Progress Note    [facility]    Gonzalo Mendenhall. Rasheed Smith., F.A.CAneudyOAneudyI. Rafael Camara D.O., STEPHANIE Leos D.O. Milton Mascorro, MSN, APRN, NP-C  Anisha Schwartz. Luis Carlos Chapman, MSN, APRN-CNP     Primary Care Physician: Elio Barton MD   Admitting Physician:  Conchita Franks MD  Admission date and time: 3/26/2021  2:36 PM    Room:  [address] [place] [address]  Admitting diagnosis: Enteritis [K52.9]    Patient Name: Matty Aguayo  MRN: [id number]    Date of Service: 3/29/2021     Subjective:  United Harding is a 67 y.o. female who was seen and examined today,3/29/2021, at the bedside. She is in more pain today than yesterday and describes it as intermittent and stabbing. She has not yet passed flatus or had bowel movement. She admits to nausea but denies vomiting. Per the patient and sister the surgeon has evaluated prior to our examination and is recommending repeat CT scan. We discussed the need become more active, more ambulatory and actively wean oxygen the patient voices understanding agreement. No family present during my examination. Review of System:   Constitutional:   Denies fever or chills, weight loss or gain, fatigue or malaise. HEENT:   Denies ear pain, sore throat, sinus or eye problems. Cardiovascular:   Denies any chest pain, irregular heartbeats, or palpitations. Respiratory:   Denies shortness of breath, coughing, sputum production, hemoptysis, or wheezing. Gastrointestinal:   As described above. Genitourinary:    Denies any urgency, frequency, hematuria. Voiding  without difficulty. Extremities:   Denies lower extremity swelling, edema or cyanosis. Neurology:    Denies any headache or focal neurological deficits, Denies generalized weakness or memory difficulty. Psch:   Denies being anxious or depressed. Musculoskeletal:    Denies  myalgias, joint complaints or back pain.    Integumentary:   Denies any rashes, ulcers, or excoriations. Denies bruising. Hematologic/Lymphatic:  Denies bruising or bleeding. Physical Exam:  No intake/output data recorded. Intake/Output Summary (Last 24 hours) at 3/29/2021 0670  Last data filed at 3/29/2021 0531  Gross per 24 hour   Intake    Output 900 ml   Net -900 ml   I/O last 3 completed shifts: In: 0   Out: 900 [Urine:900]  Patient Vitals for the past 96 hrs (Last 3 readings):   Weight   03/26/21 1434 105 lb (47.6 kg)     Vital Signs:   Blood pressure (!) 125/57, pulse 73, temperature 97.8 °F (36.6 °C), temperature source Oral, resp. rate 16, height 5' (1.524 m), weight 105 lb (47.6 kg), SpO2 95 %. General appearance:  Alert, responsive, oriented to person, place, and time. Uncomfortable, no distress. Head:  Normocephalic. No masses, lesions or tenderness. Eyes:  PERRLA. EOMI. Sclera clear. Buccal mucosa moist.  ENT:  Ears normal. Mucosa normal.  Neck:    Supple. Trachea midline. No thyromegaly. No JVD. No bruits. Heart:    Rhythm regular. Rate controlled. S1 and S2, systolic murmur  Lungs:    Symmetrical.  Diminished bibasilar. Clear to auscultation bilaterally. No wheezes. No rhonchi. No rales. Abdomen:   Soft. Mild but softly distended abdomen which is tender diffusely to palpation, most notably along the midlin. bowel sounds arehypoactive in the upper portions of the abdomen and more hypoactive in the lower quadrants   Extremities:    Peripheral pulses present. No peripheral edema. No ulcers. No cyanosis. No clubbing. Neurologic:    Alert x 3. No focal deficit. Cranial nerves grossly intact. No focal weakness. Psych:   Behavior is normal. Mood appears normal. Speech is not rapid and/or pressured. Musculoskeletal:   Spine ROM normal. Muscular strength intact. Gait not assessed. Integumentary:  No rashes  Skin normal color and texture.   Genitalia/Breast:  Deferred    Medication:  Scheduled Meds:   aspirin  81 mg Oral Daily    [Held by provider] atorvastatin  20 mg Oral Daily    [Held by provider] calcium carbonate  2 tablet Oral Daily    [Held by provider] ferrous sulfate  325 mg Oral BID     levothyroxine  75 mcg Oral Daily    metoprolol succinate  25 mg Oral Daily    [Held by provider] mirtazapine  30 mg Oral Nightly    [Held by provider] rOPINIRole  1 mg Oral Nightly    [Held by provider] sertraline  50 mg Oral Daily    [Held by provider] pyridoxine  50 mg Oral Daily    sodium chloride flush  10 mL Intravenous 2 times per day    enoxaparin  30 mg Subcutaneous Daily    metroNIDAZOLE  500 mg Intravenous Q8H    ciprofloxacin  400 mg Intravenous Q24H    pantoprazole  40 mg Intravenous Daily    And    sodium chloride (PF)  10 mL Intravenous Daily     Continuous Infusions:   sodium chloride      sodium chloride 100 mL/hr at 03/29/21 0355       Objective Data:  CBC with Differential:    Lab Results   Component Value Date    WBC 10.5 03/29/2021    RBC 2.41 03/29/2021    HGB 7.7 03/29/2021    HCT 24.5 03/29/2021     03/29/2021    .7 03/29/2021    MCH 32.0 03/29/2021    MCHC 31.4 03/29/2021    RDW 13.5 03/29/2021    NRBC 1.0 10/05/2020    SEGSPCT 62 02/09/2013    LYMPHOPCT 8.7 03/26/2021    MONOPCT 4.1 03/26/2021    MYELOPCT 1.0 10/05/2020    BASOPCT 0.4 03/26/2021    MONOSABS 0.40 03/26/2021    LYMPHSABS 0.86 03/26/2021    EOSABS 0.00 03/26/2021    BASOSABS 0.04 03/26/2021     BMP:    Lab Results   Component Value Date     03/29/2021    K 3.8 03/29/2021    K 5.2 03/27/2021     03/29/2021    CO2 18 03/29/2021    BUN 29 03/29/2021    LABALBU 2.5 03/29/2021    LABALBU 3.5 06/09/2011    CREATININE 1.3 03/29/2021    CALCIUM 8.1 03/29/2021    GFRAA 48 03/29/2021    LABGLOM 40 03/29/2021    GLUCOSE 85 03/29/2021    GLUCOSE 167 06/10/2011       Assessment:  · Enteritis/colitis with small bowel obstruction status post laparoscopic converted to open small bowel resection  · Leukocytosis likely 2/2 #1  · Chronic compensated diastolic congestive heart failure with hyperdynamic function and ejection fraction of 80%  · Mild hyperkalemia in setting of chronic kidney disease stage III   · Worsening postop anemia with concomitant anemia of chronic disease  · Hyperlipidemia   · Hypothyroidism   · Gastroesophageal reflux disease with hiatal hernia  · Moderate protein calorie malnutrition    Plan:   Raymond Brady is having increasing abdominal pain and CT scan will be repeated today to further evaluate. White blood cell count has trended downward and the patient is afebrile. Hemoglobin has trended downward with preoperative value of 12 and presently 7.7 without overt clinical bleeding. Continues to have some degree of postoperative hypoxic respiratory failure and we discussed the need to be more aggressive with incentive spirometry, activity and ambulation. Chronic morbidities labs and vital signs are being monitored address accordingly. Continue current therapy. See orders for further plan of care. More than 50% of my  time was spent at the bedside counseling/coordinating care with the patient and/or family with face to face contact. This time was spent reviewing notes and laboratory data as well as instructing and counseling the patient. Time I spent with the family or surrogate(s) is included only if the patient was incapable of providing the necessary information or participating in medical decisions. I also discussed the differential diagnosis and all of the proposed management plans with the patient and individuals accompanying the patient. [place] requires this high level of physician care and nursing on the IMC/Telemetry unit due the complexity of decision management and chance of rapid decline or death. Continued cardiac monitoring and higher level of nursing are required. I am readily available for any further decision-making and intervention.      NIC Rodriguez - CNP  3/29/2021  8:22 AM

## 2021-03-29 NOTE — PROGRESS NOTES
Occupational Therapy      Occupational Therapy referral received. Attempt this pm.   Patient kindly declined - d/t increase pain. Patient stated she knows its important to get up and move but is in too much pain right now and asked if therapy could try back.

## 2021-03-30 LAB
ALBUMIN SERPL-MCNC: 2.5 G/DL (ref 3.5–5.2)
ALP BLD-CCNC: 64 U/L (ref 35–104)
ALT SERPL-CCNC: 9 U/L (ref 0–32)
ANION GAP SERPL CALCULATED.3IONS-SCNC: 11 MMOL/L (ref 7–16)
AST SERPL-CCNC: 17 U/L (ref 0–31)
BILIRUB SERPL-MCNC: 0.2 MG/DL (ref 0–1.2)
BUN BLDV-MCNC: 25 MG/DL (ref 8–23)
CALCIUM SERPL-MCNC: 8.1 MG/DL (ref 8.6–10.2)
CHLORIDE BLD-SCNC: 108 MMOL/L (ref 98–107)
CO2: 17 MMOL/L (ref 22–29)
CREAT SERPL-MCNC: 1.2 MG/DL (ref 0.5–1)
GFR AFRICAN AMERICAN: 53
GFR NON-AFRICAN AMERICAN: 44 ML/MIN/1.73
GLUCOSE BLD-MCNC: 107 MG/DL (ref 74–99)
HCT VFR BLD CALC: 27.3 % (ref 34–48)
HEMOGLOBIN: 8.9 G/DL (ref 11.5–15.5)
MAGNESIUM: 2 MG/DL (ref 1.6–2.6)
MCH RBC QN AUTO: 32.1 PG (ref 26–35)
MCHC RBC AUTO-ENTMCNC: 32.6 % (ref 32–34.5)
MCV RBC AUTO: 98.6 FL (ref 80–99.9)
PDW BLD-RTO: 12.9 FL (ref 11.5–15)
PHOSPHORUS: 1.7 MG/DL (ref 2.5–4.5)
PLATELET # BLD: 269 E9/L (ref 130–450)
PMV BLD AUTO: 10.1 FL (ref 7–12)
POTASSIUM SERPL-SCNC: 4.3 MMOL/L (ref 3.5–5)
RBC # BLD: 2.77 E12/L (ref 3.5–5.5)
SODIUM BLD-SCNC: 136 MMOL/L (ref 132–146)
TOTAL PROTEIN: 5.1 G/DL (ref 6.4–8.3)
WBC # BLD: 9.1 E9/L (ref 4.5–11.5)

## 2021-03-30 PROCEDURE — 6370000000 HC RX 637 (ALT 250 FOR IP): Performed by: SURGERY

## 2021-03-30 PROCEDURE — 97165 OT EVAL LOW COMPLEX 30 MIN: CPT

## 2021-03-30 PROCEDURE — 80053 COMPREHEN METABOLIC PANEL: CPT

## 2021-03-30 PROCEDURE — 36415 COLL VENOUS BLD VENIPUNCTURE: CPT

## 2021-03-30 PROCEDURE — 83735 ASSAY OF MAGNESIUM: CPT

## 2021-03-30 PROCEDURE — 6360000002 HC RX W HCPCS: Performed by: INTERNAL MEDICINE

## 2021-03-30 PROCEDURE — 97530 THERAPEUTIC ACTIVITIES: CPT

## 2021-03-30 PROCEDURE — C9113 INJ PANTOPRAZOLE SODIUM, VIA: HCPCS | Performed by: SURGERY

## 2021-03-30 PROCEDURE — 6360000002 HC RX W HCPCS: Performed by: NURSE PRACTITIONER

## 2021-03-30 PROCEDURE — 97530 THERAPEUTIC ACTIVITIES: CPT | Performed by: PHYSICAL THERAPIST

## 2021-03-30 PROCEDURE — 97161 PT EVAL LOW COMPLEX 20 MIN: CPT | Performed by: PHYSICAL THERAPIST

## 2021-03-30 PROCEDURE — 6360000002 HC RX W HCPCS: Performed by: STUDENT IN AN ORGANIZED HEALTH CARE EDUCATION/TRAINING PROGRAM

## 2021-03-30 PROCEDURE — 85027 COMPLETE CBC AUTOMATED: CPT

## 2021-03-30 PROCEDURE — 99024 POSTOP FOLLOW-UP VISIT: CPT | Performed by: SURGERY

## 2021-03-30 PROCEDURE — 6360000002 HC RX W HCPCS: Performed by: SURGERY

## 2021-03-30 PROCEDURE — 2580000003 HC RX 258: Performed by: SURGERY

## 2021-03-30 PROCEDURE — 2700000000 HC OXYGEN THERAPY PER DAY

## 2021-03-30 PROCEDURE — 1200000000 HC SEMI PRIVATE

## 2021-03-30 PROCEDURE — 84100 ASSAY OF PHOSPHORUS: CPT

## 2021-03-30 RX ORDER — KETOROLAC TROMETHAMINE 15 MG/ML
15 INJECTION, SOLUTION INTRAMUSCULAR; INTRAVENOUS EVERY 6 HOURS
Status: DISCONTINUED | OUTPATIENT
Start: 2021-03-30 | End: 2021-03-31

## 2021-03-30 RX ADMIN — ROPINIROLE HYDROCHLORIDE 1 MG: 1 TABLET, FILM COATED ORAL at 21:05

## 2021-03-30 RX ADMIN — LEVOTHYROXINE SODIUM 75 MCG: 75 TABLET ORAL at 05:34

## 2021-03-30 RX ADMIN — SODIUM CHLORIDE, PRESERVATIVE FREE 10 ML: 5 INJECTION INTRAVENOUS at 08:09

## 2021-03-30 RX ADMIN — KETOROLAC TROMETHAMINE 15 MG: 15 INJECTION, SOLUTION INTRAMUSCULAR; INTRAVENOUS at 09:39

## 2021-03-30 RX ADMIN — ACETAMINOPHEN 1000 MG: 500 TABLET ORAL at 05:33

## 2021-03-30 RX ADMIN — HYDROMORPHONE HYDROCHLORIDE 0.5 MG: 1 INJECTION, SOLUTION INTRAMUSCULAR; INTRAVENOUS; SUBCUTANEOUS at 22:18

## 2021-03-30 RX ADMIN — KETOROLAC TROMETHAMINE 15 MG: 15 INJECTION, SOLUTION INTRAMUSCULAR; INTRAVENOUS at 21:06

## 2021-03-30 RX ADMIN — POTASSIUM CHLORIDE AND SODIUM CHLORIDE: 900; 300 INJECTION, SOLUTION INTRAVENOUS at 03:40

## 2021-03-30 RX ADMIN — MIRTAZAPINE 30 MG: 15 TABLET, FILM COATED ORAL at 21:05

## 2021-03-30 RX ADMIN — HYDROMORPHONE HYDROCHLORIDE 0.5 MG: 1 INJECTION, SOLUTION INTRAMUSCULAR; INTRAVENOUS; SUBCUTANEOUS at 00:03

## 2021-03-30 RX ADMIN — ASPIRIN 81 MG: 81 TABLET, CHEWABLE ORAL at 08:09

## 2021-03-30 RX ADMIN — ONDANSETRON 4 MG: 2 INJECTION INTRAMUSCULAR; INTRAVENOUS at 09:20

## 2021-03-30 RX ADMIN — HYDROMORPHONE HYDROCHLORIDE 0.5 MG: 1 INJECTION, SOLUTION INTRAMUSCULAR; INTRAVENOUS; SUBCUTANEOUS at 06:56

## 2021-03-30 RX ADMIN — POTASSIUM CHLORIDE AND SODIUM CHLORIDE: 900; 300 INJECTION, SOLUTION INTRAVENOUS at 16:57

## 2021-03-30 RX ADMIN — ACETAMINOPHEN 1000 MG: 500 TABLET ORAL at 21:05

## 2021-03-30 RX ADMIN — METOPROLOL SUCCINATE 25 MG: 25 TABLET, EXTENDED RELEASE ORAL at 08:09

## 2021-03-30 RX ADMIN — HYDROMORPHONE HYDROCHLORIDE 0.5 MG: 1 INJECTION, SOLUTION INTRAMUSCULAR; INTRAVENOUS; SUBCUTANEOUS at 10:58

## 2021-03-30 RX ADMIN — KETOROLAC TROMETHAMINE 15 MG: 15 INJECTION, SOLUTION INTRAMUSCULAR; INTRAVENOUS at 16:37

## 2021-03-30 RX ADMIN — PANTOPRAZOLE SODIUM 40 MG: 40 INJECTION, POWDER, FOR SOLUTION INTRAVENOUS at 08:09

## 2021-03-30 RX ADMIN — HYDROMORPHONE HYDROCHLORIDE 0.5 MG: 1 INJECTION, SOLUTION INTRAMUSCULAR; INTRAVENOUS; SUBCUTANEOUS at 03:39

## 2021-03-30 ASSESSMENT — PAIN DESCRIPTION - PAIN TYPE
TYPE: SURGICAL PAIN

## 2021-03-30 ASSESSMENT — PAIN DESCRIPTION - ORIENTATION
ORIENTATION: MID
ORIENTATION: MID

## 2021-03-30 ASSESSMENT — PAIN SCALES - GENERAL
PAINLEVEL_OUTOF10: 10
PAINLEVEL_OUTOF10: 10
PAINLEVEL_OUTOF10: 5
PAINLEVEL_OUTOF10: 6
PAINLEVEL_OUTOF10: 7
PAINLEVEL_OUTOF10: 10
PAINLEVEL_OUTOF10: 7
PAINLEVEL_OUTOF10: 8
PAINLEVEL_OUTOF10: 8
PAINLEVEL_OUTOF10: 9
PAINLEVEL_OUTOF10: 10
PAINLEVEL_OUTOF10: 9
PAINLEVEL_OUTOF10: 9
PAINLEVEL_OUTOF10: 8
PAINLEVEL_OUTOF10: 9

## 2021-03-30 ASSESSMENT — PAIN DESCRIPTION - DESCRIPTORS
DESCRIPTORS: STABBING
DESCRIPTORS: STABBING

## 2021-03-30 ASSESSMENT — PAIN DESCRIPTION - PROGRESSION
CLINICAL_PROGRESSION: GRADUALLY IMPROVING

## 2021-03-30 ASSESSMENT — PAIN - FUNCTIONAL ASSESSMENT
PAIN_FUNCTIONAL_ASSESSMENT: PREVENTS OR INTERFERES SOME ACTIVE ACTIVITIES AND ADLS
PAIN_FUNCTIONAL_ASSESSMENT: PREVENTS OR INTERFERES SOME ACTIVE ACTIVITIES AND ADLS

## 2021-03-30 ASSESSMENT — PAIN DESCRIPTION - LOCATION
LOCATION: ABDOMEN

## 2021-03-30 ASSESSMENT — PAIN DESCRIPTION - ONSET
ONSET: ON-GOING
ONSET: ON-GOING

## 2021-03-30 ASSESSMENT — PAIN DESCRIPTION - FREQUENCY
FREQUENCY: CONTINUOUS
FREQUENCY: CONTINUOUS

## 2021-03-30 NOTE — PROGRESS NOTES
General Surgery Progress Note  [facility]    Patient's Name/Date of Birth: Abdirahman Clements / 1945    Date: March 30, 2021     Surgeon: Gisselle Aguiar MD    Chief Complaint: post bowel resection    Patient Active Problem List   Diagnosis    Arthritis, hip    Avascular necrosis of bone of hip ([address]    HLD (hyperlipidemia)    Migraine    PUD (peptic ulcer disease)    Hypothyroid    Transient cerebral ischemia    [place] (acute kidney injury) ([address]    Acute on chronic diastolic (congestive) heart failure ([address]    CHF (congestive heart failure), NYHA class I, acute on chronic, combined (HCC)    Precordial pain    SOB (shortness of breath)    Enteritis    Small bowel obstruction ([address]       Subjective: Patient is still complaining of uncontrolled abdominal pain. She is taking Dilaudid around-the-clock with minimal improvement. She has 2 bowel movements this morning and has minimal nausea. She is tolerating clear liquids. CT abdomen pelvis yesterday revealed ileus. Labs are improving. Objective:  BP (!) 146/67   Pulse 70   Temp 98.2 °F (36.8 °C) (Oral)   Resp 18   Ht 5' (1.524 m)   Wt 105 lb (47.6 kg)   SpO2 92%   BMI 20.51 kg/m²   Labs:  Recent Labs     03/28/21  0656 03/28/21  0656 03/28/21 2017 03/29/21  0749 03/30/21  0719   WBC 14.2*  --   --  10.5 9.1   HGB 8.4*   < > 8.0* 7.7* 8.9*   HCT 26.4*   < > 25.0* 24.5* 27.3*    < > = values in this interval not displayed. Lab Results   Component Value Date    CREATININE 1.2 (H) 03/30/2021    BUN 25 (H) 03/30/2021     03/30/2021    K 4.3 03/30/2021     (H) 03/30/2021    CO2 17 (L) 03/30/2021     No results for input(s): LIPASE, AMYLASE in the last 72 hours.   CBC with Differential:    Lab Results   Component Value Date    WBC 9.1 03/30/2021    RBC 2.77 03/30/2021    HGB 8.9 03/30/2021    HCT 27.3 03/30/2021     03/30/2021    MCV 98.6 03/30/2021    MCH 32.1 03/30/2021    MCHC 32.6 03/30/2021    RDW 12.9 03/30/2021 NRBC 1.0 10/05/2020    SEGSPCT 62 02/09/2013    LYMPHOPCT 8.7 03/26/2021    MONOPCT 4.1 03/26/2021    MYELOPCT 1.0 10/05/2020    BASOPCT 0.4 03/26/2021    MONOSABS 0.40 03/26/2021    LYMPHSABS 0.86 03/26/2021    EOSABS 0.00 03/26/2021    BASOSABS 0.04 03/26/2021     CMP:    Lab Results   Component Value Date     03/30/2021    K 4.3 03/30/2021    K 5.2 03/27/2021     03/30/2021    CO2 17 03/30/2021    BUN 25 03/30/2021    CREATININE 1.2 03/30/2021    GFRAA 53 03/30/2021    LABGLOM 44 03/30/2021    GLUCOSE 107 03/30/2021    GLUCOSE 167 06/10/2011    PROT 5.1 03/30/2021    LABALBU 2.5 03/30/2021    LABALBU 3.5 06/09/2011    CALCIUM 8.1 03/30/2021    BILITOT 0.2 03/30/2021    ALKPHOS 64 03/30/2021    AST 17 03/30/2021    ALT 9 03/30/2021       General appearance:  NAD  Head: NCAT, PERRLA, EOMI, red conjunctiva  Neck: supple, no masses  Lungs: CTAB, equal chest rise bilateral  Heart: Reg rate  Abdomen: soft, nondistended, tender appropriately, incision C/D/I  Skin; no lesions  Gu: no cva tenderness  Extremities: extremities normal, atraumatic, no cyanosis or edema      Assessment/Plan:  Nate Alvarenga is a 67 y.o. female postop day 3 laparoscopic assisted small bowel resection for closed-loop obstruction    Pain control  Hemoglobin stable, creatinine back to baseline, add low dose Toradol  Monitor abdominal exam and bowel function  DC Watson and continue to monitor urine output  Out of bed ambulate    Alanna Rios MD  3/30/2021  9:22 AM

## 2021-03-30 NOTE — PROGRESS NOTES
Occupational Therapy  OCCUPATIONAL THERAPY INITIAL EVALUATION      Date:3/30/2021  Patient Name: Miguel Londono  MRN: [id number]  : 1945  Room: [address] [place] [address]    Referring Provider: Morgan Leyva DO    Evaluating OT: Valente Benson OTR/L #[id number]    AM-PAC Daily Activity Raw Score: 16    Recommended Placement: Subacute rehab  Recommended Adaptive Equipment: TBD     Diagnosis:   1. Small bowel obstruction ([facility]        Surgery:  3/27/21 -  LAPAROSCOPIC CONVERT TO OPEN SMALL BOWEL RESECTION    Pertinent Medical History:    Past Medical History:   Diagnosis Date    Acute on chronic diastolic (congestive) heart failure ([facility] 2020    Arthritis     GERD (gastroesophageal reflux disease)     H/O cardiovascular stress test 10/03/2020    Lexiscan    History of blood transfusion     Hyperlipidemia     Migraines     Thyroid disease       Precautions:  Falls, alarm, abdominal precautions     Home Living: Pt lives with son in a 2 story home with bed on 2, bath on 1 and 2 with 4 SUNNY with 1 rail(s). Bathroom setup: tub on 2, shower on 1   Equipment owned: w/c, cane, hospital bed, [employer], shower chair    Prior Level of Function: Independent with ADLs , Independent with IADLs; ambulated without AD  Driving: Yes  Occupation: Not reported    Pain Level: 10 abdomen. Received medication prior to session. Cognition: A&O: 4/4; Follows 2 step directions   Memory:  good   Sequencing:  Fair+   Problem solving:  Fair+   Judgement/safety:  Fair+     Functional Assessment:   Initial Eval Status  Date: 3/30/21 Treatment Status  Date: STGs = LTGs  Time frame: 5-7 days   Feeding Independent        Grooming Minimal Assist     Independent    UB Dressing Minimal Assist   Pulled sleeves to shoulders and tie around back.   Independent    LB Dressing Maximal Assist   Don socks at bed level  Modified Shushan    Bathing Maximal Assist    Modified Shushan    Toileting Maximal Assist   Completed hygiene while standing at EOB Modified Dragoon    Bed Mobility  Supine to sit: Moderate Assist   Sit to supine: Moderate Assist   Education/instruction on log rolling  Supine to sit: Independent   Sit to supine: Independent    Functional Transfers Moderate Assist   Sit<>stand at [employer] level  Bed  Cuing on body mechanics, balance, posture, safety and breathing techniques  Independent    Functional Mobility NT  Pt declined due to pain. Modified Dragoon    Balance Sitting:     Static:  fair    Dynamic:fair  Standing: fair  Sitting:     Static:  good    Dynamic:good  Standing: good   Activity Tolerance Fair-  fair   Visual/  Perceptual Glasses: yes   WFL       Hand Dominance Right     Strength ROM Additional Info:    RUE  4-/5  WFL good  and wfl FMC/dexterity noted during ADL tasks       LUE 4-/5  WFL good  and wfl FMC/dexterity noted during ADL tasks       Hearing: WFL   Sensation:  No c/o numbness or tingling   Tone: WFL   Edema: None noted    Comments:   Nursing approved therapy session. Upon arrival, patient supine in bed and agreeable to OT session. Therapist educated pt on role of OT. At end of session, patient supine in bed with call light and phone within reach, alarm on, all lines and tubes intact; nursing aware. Pt required extended time due to pain throughout session. Pt demonstrated fair+ understanding of education/techniques and decreased independence and safety during completion of ADL/functional transfer/mobility tasks. Pt would benefit from continued skilled OT to increase safety and independence with completion of ADL/IADL tasks for functional independence and quality of life. Treatment:   Skilled occupational therapy services provided include instruction/training on safety and adapted techniques for completion of therapeutic activities. Skilled monitoring of O2 sats, HR, and pt response throughout treatment.  Prior to and at the end of session, environmental modifications  completed for patients safety and efficiency of treatment session.  Therapist facilitated therapeutic activities: bed mobility, functional transfers, graded functional activities (static/dynamic sitting, static/dynamic standing, functional reaching) - providing min cuing (verbal, visual, tactile) on AD management, hand placement, body mechanics, posture, breathing techniques, energy conservation, compensatory strategies, and safety.      Eval Complexity: Low    Assessment of current deficits   Functional mobility [x]  ADLs [x] Strength [x]  Cognition []  Functional transfers  [x] IADLs [x] Safety Awareness [x]  Endurance [x]  Fine Motor Coordination [] Balance [x] Vision/perception [] Sensation []   Gross Motor Coordination [] ROM [] Delirium []                  Motor Control []    Plan of Care: 1-3 days/week for 1-2 weeks PRN   Instruction/training on adapted ADL techniques and AE recommendations to increase functional independence within precautions  Training on energy conservation strategies/techniques to improve independence/tolerance for self-care routine  Functional transfer/mobility training/DME recommendations for increased independence, safety, and fall prevention  Patient/Family education to increase follow through with safety techniques and functional independence  Recommendation of environmental modifications for increased safety with functional transfers/mobility and ADLs  Therapeutic exercise to improve motor endurance, ROM, and functional strength for ADLs/functional transfers  Therapeutic activities to facilitate/challenge dynamic balance, stand tolerance, fine motor dexterity/in-hand manipulation for increased independence with ADLs  Neuro-muscular re-education: facilitation of righting/equilibrium reactions, midline orientation, scapular stability/mobility, normalization of muscle tone, and facilitation of volitional active controled movement    Rehab Potential: Good for established goals     Patient / Family Goal: Not reported Patient and/or family were instructed on functional diagnosis, prognosis/goals and OT plan of care. Demonstrated fair understanding. Eval Complexity: Low      Time In: 1011  Time Out: 1038  Total Treatment Time: 15    Min Units   OT Eval Low 97165  X  1   OT Eval Medium 50823      OT Eval High 37813       OT Re-Eval [id number]       Therapeutic Ex 49738       Therapeutic Activities 34153  10 1    ADL/Self Care 93912  5     Orthotic Management 47136       Neuro Re-Ed 89472       Non-Billable Time          Evaluation Time includes thorough review of current medical information, gathering information on past medical history/social history and prior level of function, completion of standardized testing/informal observation of tasks, assessment of data and education on plan of care and goals.     Michael Gallo, OTR/L #[id number]

## 2021-03-30 NOTE — PROGRESS NOTES
P Quality Flow/Interdisciplinary Rounds Progress Note        Quality Flow Rounds held on March 30, 2021    Disciplines Attending:  Bedside Nurse, ,  and Nursing Unit Leadership    Brian Glover was admitted on 3/26/2021  2:36 PM    Anticipated Discharge Date:       Disposition:    Kulwant Score:  Kulwant Scale Score: 20    Readmission Risk              Risk of Unplanned Readmission:        22           Discussed patient goal for the day, patient clinical progression, and barriers to discharge.   The following Goal(s) of the Day/Commitment(s) have been identified:  Clear liquids, IV F, activity progression, PT/OT      Graciela Hdez  March 30, 2021

## 2021-03-30 NOTE — PROGRESS NOTES
Internal Medicine Progress Note    [facility]    Jeff Davisduke Adler. Farrah Cheema., SJOJOEY Reddy D.O., DEBI Dykes, MSN, APRN, NP-C  Vickie Clarity. Cranford Phalen, MSN, APRN-CNP     Primary Care Physician: Adriane Mosqueda MD   Admitting Physician:  Blue Llanos MD  Admission date and time: 3/26/2021  2:36 PM    Room:  [address] [place] [address]  Admitting diagnosis: Enteritis [K52.9]    Patient Name: Honey Douglas  MRN: [id number]    Date of Service: 3/30/2021     Subjective:  Tad Asher is a 67 y.o. female who was seen and examined today,3/30/2021, at the bedside. She is feeling slightly better today but continues to endorse at least moderate pain. Medication adjustments were made by the surgery team and we have discussed the absolute need to become more active and more ambulatory. Blood counts have stabilized with the withholding of Lovenox and we have substituted SCDs and their stead for DVT prophylaxis. The patient family was present and updated at bedside. Review of System:   Constitutional:   Denies fever or chills, weight loss or gain, fatigue or malaise. HEENT:   Denies ear pain, sore throat, sinus or eye problems. Cardiovascular:   Denies any chest pain, irregular heartbeats, or palpitations. Respiratory:   Denies shortness of breath, coughing, sputum production, hemoptysis, or wheezing. Gastrointestinal:   Somewhat less abdominal pain but remains a least moderate. Mild nausea without vomiting. Positive for the passage of bowel movement x 1  Genitourinary:    Denies any urgency, frequency, hematuria. Voiding  without difficulty. Extremities:   Denies lower extremity swelling, edema or cyanosis. Neurology:    Denies any headache or focal neurological deficits, Denies generalized weakness or memory difficulty. Psch:   Denies being anxious or depressed. Musculoskeletal:    Denies  myalgias, joint complaints or back pain. Integumentary:   Denies any rashes, ulcers, or excoriations. Denies bruising. Hematologic/Lymphatic:  Denies bruising or bleeding. Physical Exam:  No intake/output data recorded. Intake/Output Summary (Last 24 hours) at 3/30/2021 1016  Last data filed at 3/30/2021 0616  Gross per 24 hour   Intake 1691.25 ml   Output 850 ml   Net 841.25 ml   I/O last 3 completed shifts: In: 1691.3 [P.O.:300; I.V.:1391.3]  Out: 850 [Urine:850]  Patient Vitals for the past 96 hrs (Last 3 readings):   Weight   03/26/21 1434 105 lb (47.6 kg)     Vital Signs:   Blood pressure (!) 146/67, pulse 70, temperature 98.2 °F (36.8 °C), temperature source Oral, resp. rate 18, height 5' (1.524 m), weight 105 lb (47.6 kg), SpO2 92 %. General appearance:  Alert, responsive, oriented to person, place, and time. Uncomfortable, no distress. Head:  Normocephalic. No masses, lesions or tenderness. Eyes:  PERRLA. EOMI. Sclera clear. Buccal mucosa moist.  ENT:  Ears normal. Mucosa normal.  Neck:    Supple. Trachea midline. No thyromegaly. No JVD. No bruits. Heart:    Rhythm regular. Rate controlled. S1 and S2, systolic murmur  Lungs:    Symmetrical.  Diminished bibasilar. Clear to auscultation bilaterally. No wheezes. No rhonchi. No rales. Abdomen:   Soft. Mild but softly distended abdomen which is tender diffusely to palpation, most notably along the midlin. bowel sounds are mildly hypoactive throughout the abdomen   Extremities:    Peripheral pulses present. No peripheral edema. No ulcers. No cyanosis. No clubbing. Neurologic:    Alert x 3. No focal deficit. Cranial nerves grossly intact. No focal weakness. Psych:   Behavior is normal. Mood appears normal. Speech is not rapid and/or pressured. Musculoskeletal:   Spine ROM normal. Muscular strength intact. Gait not assessed. Integumentary:  No rashes  Skin normal color and texture.   Genitalia/Breast:  Deferred    Medication:  Scheduled Meds:   ketorolac  15 mg Intravenous Q6H    acetaminophen  1,000 mg Oral 3 times per day    lidocaine  1 patch Transdermal Daily    aspirin  81 mg Oral Daily    [Held by provider] atorvastatin  20 mg Oral Daily    [Held by provider] calcium carbonate  2 tablet Oral Daily    [Held by provider] ferrous sulfate  325 mg Oral BID WC    levothyroxine  75 mcg Oral Daily    metoprolol succinate  25 mg Oral Daily    [Held by provider] mirtazapine  30 mg Oral Nightly    [Held by provider] rOPINIRole  1 mg Oral Nightly    [Held by provider] sertraline  50 mg Oral Daily    [Held by provider] pyridoxine  50 mg Oral Daily    sodium chloride flush  10 mL Intravenous 2 times per day    [Held by provider] enoxaparin  30 mg Subcutaneous Daily    pantoprazole  40 mg Intravenous Daily    And    sodium chloride (PF)  10 mL Intravenous Daily     Continuous Infusions:   0.9% NaCl with KCl 40 mEq 75 mL/hr at 03/30/21 0340    sodium chloride         Objective Data:  CBC with Differential:    Lab Results   Component Value Date    WBC 9.1 03/30/2021    RBC 2.77 03/30/2021    HGB 8.9 03/30/2021    HCT 27.3 03/30/2021     03/30/2021    MCV 98.6 03/30/2021    MCH 32.1 03/30/2021    MCHC 32.6 03/30/2021    RDW 12.9 03/30/2021    NRBC 1.0 10/05/2020    SEGSPCT 62 02/09/2013    LYMPHOPCT 8.7 03/26/2021    MONOPCT 4.1 03/26/2021    MYELOPCT 1.0 10/05/2020    BASOPCT 0.4 03/26/2021    MONOSABS 0.40 03/26/2021    LYMPHSABS 0.86 03/26/2021    EOSABS 0.00 03/26/2021    BASOSABS 0.04 03/26/2021     BMP:    Lab Results   Component Value Date     03/30/2021    K 4.3 03/30/2021    K 5.2 03/27/2021     03/30/2021    CO2 17 03/30/2021    BUN 25 03/30/2021    LABALBU 2.5 03/30/2021    LABALBU 3.5 06/09/2011    CREATININE 1.2 03/30/2021    CALCIUM 8.1 03/30/2021    GFRAA 53 03/30/2021    LABGLOM 44 03/30/2021    GLUCOSE 107 03/30/2021    GLUCOSE 167 06/10/2011       Assessment:  · Enteritis/colitis with small bowel obstruction status post laparoscopic converted to open small bowel resection  · Leukocytosis likely 2/2 #1  · Acute postoperative anemia   · Chronic compensated diastolic congestive heart failure with hyperdynamic function and ejection fraction of 80%  · Mild hyperkalemia in setting of chronic kidney disease stage III   · Hyperlipidemia   · Hypothyroidism   · Gastroesophageal reflux disease with hiatal hernia  · Moderate protein calorie malnutrition    Plan:   Rayna Pal is having ongoing abdominal pain which is marginally improved and medications have been adjusted by the surgery team.  She did have return of bowel function yesterday with a dark-colored loose bowel movement according to the RN. Her blood count has trended upward however and Lovenox is presently being held in the setting of recently worsening postoperative anemia. SCDs will be utilized in their stead for DVT prophylaxis until patient is cleared to resume anticoagulation per the surgery team.  Patient's oral intake is improving but remains in adequate and gentle fluids are being administered, fluid rate will be reduced. We will need to be mindful of the patient's renal function as Toradol is being given. Continues to have some degree of postoperative hypoxic respiratory failure and we discussed the need to be more aggressive with incentive spirometry, activity and ambulation. Chronic morbidities labs and vital signs are being monitored address accordingly. Continue current therapy. See orders for further plan of care. More than 50% of my  time was spent at the bedside counseling/coordinating care with the patient and/or family with face to face contact. This time was spent reviewing notes and laboratory data as well as instructing and counseling the patient. Time I spent with the family or surrogate(s) is included only if the patient was incapable of providing the necessary information or participating in medical decisions.  I also discussed the differential diagnosis and all of the proposed management plans with the patient and individuals accompanying the patient. [place] requires this high level of physician care and nursing on the IMC/Telemetry unit due the complexity of decision management and chance of rapid decline or death. Continued cardiac monitoring and higher level of nursing are required. I am readily available for any further decision-making and intervention.      Toshia Malik, NIC - CNP  3/30/2021  10:16 AM

## 2021-03-30 NOTE — PROGRESS NOTES
Physical Therapy Initial Evaluation    Room #:  2554/8371-32  Patient Name: Jo Carmona  YOB: 1945  MRN: [id number]    Referring Provider:   Jcarlos Santacruz DO      Date of Service: 3/30/2021    Evaluating Physical Therapist: Whit Segovia, PT #[id number]       Diagnosis:   Enteritis [K52.9]    abodminal pain,  nausea and dry heaving but no significant vomiting     Patient Active Problem List   Diagnosis    Arthritis, hip    Avascular necrosis of bone of hip ([address]    HLD (hyperlipidemia)    Migraine    PUD (peptic ulcer disease)    Hypothyroid    Transient cerebral ischemia    BROOKLYN (acute kidney injury) ([address]    Acute on chronic diastolic (congestive) heart failure ([address]    CHF (congestive heart failure), NYHA class I, acute on chronic, combined (HCC)    Precordial pain    SOB (shortness of breath)    Enteritis    Small bowel obstruction ([address]        Tentative placement recommendation: Subacute vs Home Health Physical Therapy if patient meets goals    Equipment recommendation: Patient has needed equipment       Prior Level of Function: Patient ambulated independently  drives  Rehab Potential: good    for baseline    Past medical history:   Past Medical History:   Diagnosis Date    Acute on chronic diastolic (congestive) heart failure ([address] 9/29/2020    Arthritis     GERD (gastroesophageal reflux disease)     H/O cardiovascular stress test 10/03/2020    Lexiscan    History of blood transfusion     Hyperlipidemia     Migraines     Thyroid disease      Past Surgical History:   Procedure Laterality Date    COLECTOMY N/A 3/27/2021    LAPAROSCOPIC CONVERT TO OPEN SMALL BOWEL RESECTION performed by Chris Sweet MD at [address] [place] [address],[facility] COLONOSCOPY      ENDOSCOPY, COLON, DIAGNOSTIC      FRACTURE SURGERY      left wrist, 2010-fx left hip    HYSTERECTOMY      JOINT REPLACEMENT      bilat knees    OTHER SURGICAL HISTORY Left 2-7-13    removal of hardware left hip left hip arthroplasty Precautions: Up as tolerated, falls , s/p LAPAROSCOPIC CONVERT TO OPEN SMALL BOWEL RESECTION    SUBJECTIVE:    Social history: Patient lives with son   in a two story home bedroom 2nd floor  with 5 steps  to enter with [employer] in shower first floor, tub shower 2nd floor      Equipment owned: Wheelchair, Cane, [address] and [address] chair,       [address]   How much difficulty turning over in bed?: A Little  How much difficulty sitting down on / standing up from a chair with arms?: A Little  How much difficulty moving from lying on back to sitting on side of bed?: A Little  How much help from another person moving to and from a bed to a chair?: A Little  How much help from another person needed to walk in hospital room?: A Little  How much help from another person for climbing 3-5 steps with a railing?: A Lot  AM-PAC Inpatient Mobility Raw Score : 17  AM-PAC Inpatient T-Scale Score : 42.13  Mobility Inpatient CMS 0-100% Score: 50.57  Mobility Inpatient CMS G-Code Modifier : CK    Nursing cleared patient for PT evaluation. The admitting diagnosis and active problem list as listed above have been reviewed prior to the initiation of this evaluation. OBJECTIVE;   Initial Evaluation  Date: 3/30/2021 Treatment Date:     Short Term/ Long Term   Goals   Was pt agreeable to Eval/treatment? Yes    To be met in 3 days   Pain level   7/10  abdomen     Bed Mobility    Rolling: Minimal assist of 1    Supine to sit: Minimal assist of 1    Sit to supine: Not assessed     Scooting: Minimal assist of 1    Rolling: Independent    Supine to sit:  Independent    Sit to supine: Independent    Scooting: Independent     Transfers Sit to stand: Minimal assist of 1 Cues for hand placement and safety   Sit to stand: Independent     Ambulation    45 feet using  wheeled walker with Minimal assist of 1       100 feet using  wheeled walker with Modified Independent    Stair negotiation: ascended and descended   Not assessed       10 steps 1 rail with supervision    ROM Within functional limits        Strength BUE:  refer to OT eval  RLE:  4-/5  LLE:  4-/5   Increase strength in affected mm groups by 1/3 grade   Balance Sitting EOB:  good    Dynamic Standing:  fair    Sitting EOB:  good    Dynamic Standing: good       Patient is Alert & Oriented x person, place, time and situation and follows directions    Sensation:  Patient  denies numbness and tingling     Edema:  none noted    Endurance: fair       Vitals: 2 liters nasal cannula    Blood Pressure at rest   Blood Pressure during session     Heart Rate at rest 70 Heart Rate during session  72   SPO2 at rest 95%  SPO2 during session  90% during gait room air, 97% after therapy room air     Patient education  Patient educated on role of Physical Therapy, risks of immobility, safety and plan of care,  importance of mobility while in hospital  and positioning for skin integrity and comfort      Patient response to education:   Pt verbalized understanding Pt demonstrated skill Pt requires further education in this area   Yes Partial Yes      Treatment:  Patient practiced and was instructed/facilitated in the following treatment: Patient assisted to edge of bed, discussed log roll for abdominal protection. Sat edge of bed 10 minutes with Supervision  to increase dynamic sitting balance and activity tolerance. ambulated in room, assisted up to chair with snack. Therapeutic Exercises:  ankle pumps  x 10 reps. At end of session, patient in chair with   call light and phone within reach,   all lines and tubes intact, nursing notified. Patient would benefit from continued skilled Physical Therapy to improve functional independence and quality of life.           Patient's/ family goals   home        ASSESSMENT: Patient exhibits decreased strength, balance, endurance and pain abdomen impairing functional mobility, transfers, gait , gait distance and tolerance to activity       Plan of Care:     -Bed Mobility: Lower extremity exercises   -Sitting Balance: Incorporate reaching activities to activate trunk muscles   -Standing Balance: Perform strengthening exercises in standing to promote motor control with or without upper extremity support   -Transfers: Cues for hand placement, technique and safety  -Gait: Gait training and Standing activities to improve: base of support, weight shift, weight bearing    -Endurance: Utilize Supervised activities to increase level of endurance to allow for safe functional mobility including transfers and gait   -Stairs: Stair training with instruction on proper technique and hand placement on rail    Patient and or family understand(s) diagnosis, prognosis, and plan of care. Frequency of treatments: Patient will be seen  daily. Time in  1216  Time out  1241    Total Treatment Time  15 minutes    Evaluation time includes thorough review of current medical information, gathering information on past medical history/social history and prior level of function, completion of standardized testing/informal observation of tasks, assessment of data, and development of Plan of care and goals.      CPT codes:  Low Complexity PT evaluation (36592)  Therapeutic activities (36363)   10 minutes  1 unit(s)  Gait Training (56905) 5 minutes 0 unit(s)    Meagan Snow, PT

## 2021-03-30 NOTE — PROGRESS NOTES
Physician Progress Note      Madison Arias  CSN #:                  [id number]  :                       1945  ADMIT DATE:       3/26/2021 2:36 PM  [address] [place] DATE:  RESPONDING  PROVIDER #:        Helen Sharma MD          QUERY TEXT:    Dear Attending Provider,    Pt admitted with Enteritis/colitis with small bowel obstruction. Pt noted to   have hemoglobin of 7.7. If possible, please document in the progress notes and   discharge summary if you are evaluating and/or treating any of the following: The medical record reflects the following:  Risk Factors include: CKD, surgery  Clinical Indicators include:Hgb: 3/26/21-12.3, 3/28-8.4, 3/29-7.7; Per 3/27 IM   Note: Hemoglobin has trended downward in the setting of fluid resuscitation   and recent surgery and we will cycle hemoglobin hematocrit and transfuse to   maintain hemoglobin greater than 7. 3/29 IM Note:Hemoglobin has trended   downward with preoperative value of 12 and presently 7.7 without overt   clinical bleeding. Treatment includes: Lab monitoring, Blood transfusion if needed and   assessments. Thank you,  Darby Bass RN, BSN, CCDS  [phone]  Options provided:  -- Acute blood loss anemia  -- Acute on chronic blood loss anemia  -- Postoperative acute blood loss anemia  -- Anemia of chronic disease due to CKD  -- Dilutional anemia  -- Precipitous drop in Hemoglobin and Hematocrit  -- Other - I will add my own diagnosis  -- Disagree - Not applicable / Not valid  -- Disagree - Clinically unable to determine / Unknown  -- Refer to Clinical Documentation Reviewer    PROVIDER RESPONSE TEXT:    This patient has acute blood loss anemia. Query created by:  Zahra Paz on 3/29/2021 2:15 PM      Electronically signed by:  Helen Sharma MD 3/29/2021 10:54 PM

## 2021-03-30 NOTE — CARE COORDINATION
SS Note: No Covid testing. Therapy evals recommending SNF vs HHC. SW met with pt and reviewed transition of care plan. Pt denied need for SNF, stated she plans to return home she shares with son or may stay with her sister who resides nearby. Pt is receptive to [facility], denies need for [facility] list as she has no preference. SW called referral to Sherryle Crandall at [facility] and she is reviewing, will need [facility] orders.   Electronically signed by CAROLINA Beach on 3/30/2021 at 2:36 PM

## 2021-03-31 LAB
ALBUMIN SERPL-MCNC: 2.3 G/DL (ref 3.5–5.2)
ALP BLD-CCNC: 57 U/L (ref 35–104)
ALT SERPL-CCNC: 9 U/L (ref 0–32)
ANION GAP SERPL CALCULATED.3IONS-SCNC: 8 MMOL/L (ref 7–16)
AST SERPL-CCNC: 23 U/L (ref 0–31)
BILIRUB SERPL-MCNC: <0.2 MG/DL (ref 0–1.2)
BUN BLDV-MCNC: 18 MG/DL (ref 8–23)
CALCIUM SERPL-MCNC: 7.5 MG/DL (ref 8.6–10.2)
CHLORIDE BLD-SCNC: 114 MMOL/L (ref 98–107)
CO2: 17 MMOL/L (ref 22–29)
CREAT SERPL-MCNC: 1.1 MG/DL (ref 0.5–1)
GFR AFRICAN AMERICAN: 58
GFR NON-AFRICAN AMERICAN: 48 ML/MIN/1.73
GLUCOSE BLD-MCNC: 102 MG/DL (ref 74–99)
HCT VFR BLD CALC: 25.5 % (ref 34–48)
HCT VFR BLD CALC: 28.2 % (ref 34–48)
HEMOGLOBIN: 8.1 G/DL (ref 11.5–15.5)
HEMOGLOBIN: 9.1 G/DL (ref 11.5–15.5)
MCH RBC QN AUTO: 31.9 PG (ref 26–35)
MCHC RBC AUTO-ENTMCNC: 31.8 % (ref 32–34.5)
MCV RBC AUTO: 100.4 FL (ref 80–99.9)
PDW BLD-RTO: 13.2 FL (ref 11.5–15)
PLATELET # BLD: 244 E9/L (ref 130–450)
PMV BLD AUTO: 10.7 FL (ref 7–12)
POTASSIUM SERPL-SCNC: 5 MMOL/L (ref 3.5–5)
RBC # BLD: 2.54 E12/L (ref 3.5–5.5)
SODIUM BLD-SCNC: 139 MMOL/L (ref 132–146)
TOTAL PROTEIN: 4.7 G/DL (ref 6.4–8.3)
WBC # BLD: 7.1 E9/L (ref 4.5–11.5)

## 2021-03-31 PROCEDURE — 85027 COMPLETE CBC AUTOMATED: CPT

## 2021-03-31 PROCEDURE — 6360000002 HC RX W HCPCS: Performed by: SURGERY

## 2021-03-31 PROCEDURE — C9113 INJ PANTOPRAZOLE SODIUM, VIA: HCPCS | Performed by: SURGERY

## 2021-03-31 PROCEDURE — 85018 HEMOGLOBIN: CPT

## 2021-03-31 PROCEDURE — 6370000000 HC RX 637 (ALT 250 FOR IP): Performed by: SURGERY

## 2021-03-31 PROCEDURE — 6360000002 HC RX W HCPCS: Performed by: STUDENT IN AN ORGANIZED HEALTH CARE EDUCATION/TRAINING PROGRAM

## 2021-03-31 PROCEDURE — 97530 THERAPEUTIC ACTIVITIES: CPT

## 2021-03-31 PROCEDURE — 85014 HEMATOCRIT: CPT

## 2021-03-31 PROCEDURE — 80053 COMPREHEN METABOLIC PANEL: CPT

## 2021-03-31 PROCEDURE — 36415 COLL VENOUS BLD VENIPUNCTURE: CPT

## 2021-03-31 PROCEDURE — 1200000000 HC SEMI PRIVATE

## 2021-03-31 PROCEDURE — 2580000003 HC RX 258: Performed by: SURGERY

## 2021-03-31 RX ORDER — PANTOPRAZOLE SODIUM 40 MG/1
40 TABLET, DELAYED RELEASE ORAL
Status: DISCONTINUED | OUTPATIENT
Start: 2021-04-01 | End: 2021-04-03 | Stop reason: HOSPADM

## 2021-03-31 RX ADMIN — CALCIUM CARBONATE (ANTACID) CHEW TAB 500 MG 1000 MG: 500 CHEW TAB at 09:09

## 2021-03-31 RX ADMIN — KETOROLAC TROMETHAMINE 15 MG: 15 INJECTION, SOLUTION INTRAMUSCULAR; INTRAVENOUS at 03:48

## 2021-03-31 RX ADMIN — ACETAMINOPHEN 1000 MG: 500 TABLET ORAL at 14:05

## 2021-03-31 RX ADMIN — ROPINIROLE HYDROCHLORIDE 1 MG: 1 TABLET, FILM COATED ORAL at 22:40

## 2021-03-31 RX ADMIN — MIRTAZAPINE 30 MG: 15 TABLET, FILM COATED ORAL at 22:40

## 2021-03-31 RX ADMIN — FERROUS SULFATE TAB 325 MG (65 MG ELEMENTAL FE) 325 MG: 325 (65 FE) TAB at 09:08

## 2021-03-31 RX ADMIN — HYDROMORPHONE HYDROCHLORIDE 0.5 MG: 1 INJECTION, SOLUTION INTRAMUSCULAR; INTRAVENOUS; SUBCUTANEOUS at 16:30

## 2021-03-31 RX ADMIN — LEVOTHYROXINE SODIUM 75 MCG: 75 TABLET ORAL at 05:57

## 2021-03-31 RX ADMIN — ACETAMINOPHEN 1000 MG: 500 TABLET ORAL at 05:57

## 2021-03-31 RX ADMIN — ATORVASTATIN CALCIUM 20 MG: 20 TABLET, FILM COATED ORAL at 09:08

## 2021-03-31 RX ADMIN — METOPROLOL SUCCINATE 25 MG: 25 TABLET, EXTENDED RELEASE ORAL at 09:08

## 2021-03-31 RX ADMIN — HYDROMORPHONE HYDROCHLORIDE 0.5 MG: 1 INJECTION, SOLUTION INTRAMUSCULAR; INTRAVENOUS; SUBCUTANEOUS at 02:54

## 2021-03-31 RX ADMIN — SODIUM CHLORIDE, PRESERVATIVE FREE 10 ML: 5 INJECTION INTRAVENOUS at 22:41

## 2021-03-31 RX ADMIN — KETOROLAC TROMETHAMINE 15 MG: 15 INJECTION, SOLUTION INTRAMUSCULAR; INTRAVENOUS at 09:09

## 2021-03-31 RX ADMIN — SERTRALINE 50 MG: 50 TABLET, FILM COATED ORAL at 09:08

## 2021-03-31 RX ADMIN — PYRIDOXINE HCL TAB 50 MG 50 MG: 50 TAB at 09:08

## 2021-03-31 RX ADMIN — ASPIRIN 81 MG: 81 TABLET, CHEWABLE ORAL at 09:08

## 2021-03-31 RX ADMIN — SODIUM CHLORIDE, PRESERVATIVE FREE 10 ML: 5 INJECTION INTRAVENOUS at 09:09

## 2021-03-31 RX ADMIN — ACETAMINOPHEN 1000 MG: 500 TABLET ORAL at 22:40

## 2021-03-31 RX ADMIN — HYDROMORPHONE HYDROCHLORIDE 0.5 MG: 1 INJECTION, SOLUTION INTRAMUSCULAR; INTRAVENOUS; SUBCUTANEOUS at 12:13

## 2021-03-31 RX ADMIN — OXYCODONE 5 MG: 5 TABLET ORAL at 18:32

## 2021-03-31 RX ADMIN — OXYCODONE 5 MG: 5 TABLET ORAL at 09:08

## 2021-03-31 RX ADMIN — PANTOPRAZOLE SODIUM 40 MG: 40 INJECTION, POWDER, FOR SOLUTION INTRAVENOUS at 09:09

## 2021-03-31 RX ADMIN — OXYCODONE 5 MG: 5 TABLET ORAL at 14:05

## 2021-03-31 RX ADMIN — HYDROMORPHONE HYDROCHLORIDE 0.5 MG: 1 INJECTION, SOLUTION INTRAMUSCULAR; INTRAVENOUS; SUBCUTANEOUS at 22:40

## 2021-03-31 ASSESSMENT — PAIN DESCRIPTION - DESCRIPTORS
DESCRIPTORS: STABBING;SORE
DESCRIPTORS: ACHING;DISCOMFORT;SORE
DESCRIPTORS: STABBING;SORE
DESCRIPTORS: STABBING;SHARP;DISCOMFORT

## 2021-03-31 ASSESSMENT — PAIN SCALES - GENERAL
PAINLEVEL_OUTOF10: 7
PAINLEVEL_OUTOF10: 8
PAINLEVEL_OUTOF10: 7
PAINLEVEL_OUTOF10: 9
PAINLEVEL_OUTOF10: 7
PAINLEVEL_OUTOF10: 9
PAINLEVEL_OUTOF10: 9
PAINLEVEL_OUTOF10: 8
PAINLEVEL_OUTOF10: 6
PAINLEVEL_OUTOF10: 9

## 2021-03-31 ASSESSMENT — PAIN DESCRIPTION - FREQUENCY
FREQUENCY: CONTINUOUS

## 2021-03-31 ASSESSMENT — PAIN DESCRIPTION - PROGRESSION
CLINICAL_PROGRESSION: GRADUALLY IMPROVING
CLINICAL_PROGRESSION: NOT CHANGED

## 2021-03-31 ASSESSMENT — PAIN DESCRIPTION - PAIN TYPE
TYPE: SURGICAL PAIN

## 2021-03-31 ASSESSMENT — PAIN - FUNCTIONAL ASSESSMENT
PAIN_FUNCTIONAL_ASSESSMENT: PREVENTS OR INTERFERES SOME ACTIVE ACTIVITIES AND ADLS

## 2021-03-31 ASSESSMENT — PAIN DESCRIPTION - LOCATION
LOCATION: ABDOMEN

## 2021-03-31 ASSESSMENT — PAIN DESCRIPTION - ORIENTATION
ORIENTATION: MID

## 2021-03-31 ASSESSMENT — PAIN DESCRIPTION - ONSET
ONSET: ON-GOING

## 2021-03-31 NOTE — CARE COORDINATION
SS Note:  No Covid testing. Pt plans to return to home she shares with son or may stay with her sister who resides nearby. [facility] has accepted pt, will need [facility] orders.   Electronically signed by CAROLINA Craig on 3/31/2021 at 9:59 AM

## 2021-03-31 NOTE — PLAN OF CARE
Problem: Pain:  Goal: Pain level will decrease  Description: Pain level will decrease  3/31/2021 0615 by Sadia Bragg  Outcome: Met This Shift     Problem: Pain:  Goal: Control of acute pain  Description: Control of acute pain  3/31/2021 0615 by Sadia Bragg  Outcome: Met This Shift     Problem: Pain:  Goal: Control of chronic pain  Description: Control of chronic pain  3/31/2021 0615 by Sadia Bragg  Outcome: Met This Shift     Problem: Falls - Risk of:  Goal: Will remain free from falls  Description: Will remain free from falls  3/31/2021 0615 by Sadia Bragg  Outcome: Met This Shift     Problem: Falls - Risk of:  Goal: Absence of physical injury  Description: Absence of physical injury  3/31/2021 0615 by Sadia Bragg  Outcome: Met This Shift

## 2021-03-31 NOTE — PROGRESS NOTES
Physical Therapy Treatment Note    Room #:  8764/3501-20  Patient Name: Wayne Jerez  YOB: 1945  MRN: [id number]    Referring Provider:   Marisol Segal DO      Date of Service: 3/31/2021    Evaluating Physical Therapist: Vladimir Chilel, PT #[id number]       Diagnosis:   Enteritis [K52.9]    abodminal pain,  nausea and dry heaving but no significant vomiting     Patient Active Problem List   Diagnosis    Arthritis, hip    Avascular necrosis of bone of hip ([address]    HLD (hyperlipidemia)    Migraine    PUD (peptic ulcer disease)    Hypothyroid    Transient cerebral ischemia    BROOKLYN (acute kidney injury) ([address]    Acute on chronic diastolic (congestive) heart failure ([address]    CHF (congestive heart failure), NYHA class I, acute on chronic, combined (HCC)    Precordial pain    SOB (shortness of breath)    Enteritis    Small bowel obstruction ([address]        Tentative placement recommendation: Subacute vs Home Health Physical Therapy if patient meets goals    Equipment recommendation: Patient has needed equipment       Prior Level of Function: Patient ambulated independently  drives  Rehab Potential: good    for baseline    Past medical history:   Past Medical History:   Diagnosis Date    Acute on chronic diastolic (congestive) heart failure ([address] 9/29/2020    Arthritis     GERD (gastroesophageal reflux disease)     H/O cardiovascular stress test 10/03/2020    Lexiscan    History of blood transfusion     Hyperlipidemia     Migraines     Thyroid disease      Past Surgical History:   Procedure Laterality Date    COLECTOMY N/A 3/27/2021    LAPAROSCOPIC CONVERT TO OPEN SMALL BOWEL RESECTION performed by Kevin Aguero MD at [address] [place] [address],[facility] COLONOSCOPY      ENDOSCOPY, COLON, DIAGNOSTIC      FRACTURE SURGERY      left wrist, 2010-fx left hip    HYSTERECTOMY      JOINT REPLACEMENT      bilat knees    OTHER SURGICAL HISTORY Left 2-7-13    removal of hardware left hip left hip arthroplasty Precautions: Up as tolerated, falls , s/p LAPAROSCOPIC CONVERT TO OPEN SMALL BOWEL RESECTION    SUBJECTIVE:    Social history: Patient lives with son   in a two story home bedroom 2nd floor  with 5 steps  to enter with [employer] in shower first floor, tub shower 2nd floor      Equipment owned: Wheelchair, Cane, [address] and [employer] chair,       [address] [facility]   How much difficulty turning over in bed?: A Lot  How much difficulty sitting down on / standing up from a chair with arms?: A Lot  How much difficulty moving from lying on back to sitting on side of bed?: A Lot  How much help from another person moving to and from a bed to a chair?: A Lot  How much help from another person needed to walk in hospital room?: A Lot  How much help from another person for climbing 3-5 steps with a railing?: Total  AM-PAC Inpatient Mobility Raw Score : 11  AM-PAC Inpatient T-Scale Score : 33.86  Mobility Inpatient CMS 0-100% Score: 72.57  Mobility Inpatient CMS G-Code Modifier : CL    Nursing cleared patient for PT treatment. OBJECTIVE;   Initial Evaluation  Date: 3/30/2021 Treatment Date:  3/31/2021     Short Term/ Long Term   Goals   Was pt agreeable to Eval/treatment? Yes   yes To be met in 3 days   Pain level   7/10  abdomen C/o abdominal pain    Bed Mobility    Rolling: Minimal assist of 1    Supine to sit: Minimal assist of 1    Sit to supine: Not assessed     Scooting: Minimal assist of 1   Rolling: Minimal assist of 1  Supine to sit: Minimal assist of 1   Sit to supine: Minimal assist of 1   Scooting: Minimal assist of 1    Rolling: Independent    Supine to sit:  Independent    Sit to supine: Independent    Scooting: Independent     Transfers Sit to stand: Minimal assist of 1 Cues for hand placement and safety  Sit to stand: Minimal assist of 1      Sit to stand: Independent     Ambulation    45 feet using  wheeled walker with Minimal assist of 1     not assessed    100 feet using  wheeled walker with Modified Independent    Stair negotiation: ascended and descended   Not assessed       10 steps 1 rail with supervision    ROM Within functional limits        Strength BUE:  refer to OT rosa  RLE:  4-/5  LLE:  4-/5   Increase strength in affected mm groups by 1/3 grade   Balance Sitting EOB:  good    Dynamic Standing:  fair   Sitting EOB: good   Dynamic Standing: not assessed    Sitting EOB:  good    Dynamic Standing: good       Patient is Alert & Oriented x person, place, time and situation and follows directions    Sensation:  Patient  denies numbness and tingling     Edema:  none noted    Endurance: fair       Vitals: 2 liters nasal cannula    Blood Pressure at rest   Blood Pressure during session     Heart Rate at rest Heart Rate during session     SPO2 at rest   SPO2 during session   during gait room air,  after therapy room air     Patient education  Patient educated on role of Physical Therapy, risks of immobility, safety and plan of care,  importance of mobility while in hospital  and positioning for skin integrity and comfort      Patient response to education:   Pt verbalized understanding Pt demonstrated skill Pt requires further education in this area   Yes Partial Yes      Treatment:  Patient practiced and was instructed/facilitated in the following treatment: Patient assisted to edge of bed,   Sat edge of bed 5 minutes with Supervision  to increase dynamic sitting balance and activity tolerance. C/o abdominal pain, asking to rtb. Therapeutic Exercises:  not performed        At end of session, patient in bed with   call light and phone within reach,   all lines and tubes intact, nursing notified. Patient would benefit from continued skilled Physical Therapy to improve functional independence and quality of life.           Patient's/ family goals   home        ASSESSMENT: Patient exhibits decreased strength, balance, endurance and pain abdomen impairing functional mobility, transfers, gait , gait distance and tolerance to activity       Plan of Care:     -Bed Mobility: Lower extremity exercises   -Sitting Balance: Incorporate reaching activities to activate trunk muscles   -Standing Balance: Perform strengthening exercises in standing to promote motor control with or without upper extremity support   -Transfers: Cues for hand placement, technique and safety  -Gait: Gait training and Standing activities to improve: base of support, weight shift, weight bearing    -Endurance: Utilize Supervised activities to increase level of endurance to allow for safe functional mobility including transfers and gait   -Stairs: Stair training with instruction on proper technique and hand placement on rail    Patient and or family understand(s) diagnosis, prognosis, and plan of care. Frequency of treatments: Patient will be seen  daily.        Time in  1604  Time out  1620    Total Treatment Time  16 minutes  CPT codes:  Therapeutic activities (35205)   16 minutes  1 unit(s)    Rhys Rodriguez PTA [id number]

## 2021-03-31 NOTE — PROGRESS NOTES
Internal Medicine Progress Note    [facility]    Ailyn Sherwood, SJOJOEY Ball D.O., DEBI Patrick Cap, MSN, APRN, NP-C  Yrn Maldonado. Orestes Stuart, MSN, APRN-CNP     Primary Care Physician: Rubi Medeiros MD   Admitting Physician:  Libra Ibrahim MD  Admission date and time: 3/26/2021  2:36 PM    Room:  [address] [place] [address]  Admitting diagnosis: Enteritis [K52.9]    Patient Name: Rita Robles  MRN: [id number]    Date of Service: 3/31/2021     Subjective:  Jeaneth Garcia is a 67 y.o. female who was seen and examined today,3/31/2021, at the bedside. Jeaneth Garcia was seen and evaluated in the presence of her sister today. She is feeling better overall and was able to ambulate 45 feet with physical therapy teams yesterday. We discussed her downward trending hemoglobin. We discussed further advancement in her diet. We also discussed discharge planning and her goal is to be discharged home where she will stay with her sister. Review of System:   Constitutional:   Admits to improving malaise and fatigue. HEENT:   Denies ear pain, sore throat, sinus or eye problems. Cardiovascular:   Denies any chest pain, irregular heartbeats, or palpitations. Respiratory:   Denies shortness of breath, coughing, sputum production, hemoptysis, or wheezing. Gastrointestinal:   Abdominal pain continues to improve. She is requesting advancement in her diet as she has an increasing appetite. She describes 4 bowel movements yesterday. Genitourinary:    Denies any urgency, frequency, hematuria. Voiding without difficulty. Extremities:   Denies lower extremity swelling, edema or cyanosis. Neurology:    Denies any headache or focal neurological deficits, Denies generalized weakness or memory difficulty. Psch:   Denies being anxious or depressed. Musculoskeletal:    Denies  myalgias, joint complaints or back pain.    Integumentary:   Denies any rashes, ulcers, or excoriations. Denies bruising. Hematologic/Lymphatic:  Denies bruising or bleeding. Physical Exam:  No intake/output data recorded. Intake/Output Summary (Last 24 hours) at 3/31/2021 0934  Last data filed at 3/30/2021 1442  Gross per 24 hour   Intake 0 ml   Output 250 ml   Net -250 ml   I/O last 3 completed shifts: In: 0   Out: 501 [Urine:500; Emesis/NG output:1]  No data found. Vital Signs:   Blood pressure (!) 142/68, pulse 70, temperature 97.9 °F (36.6 °C), temperature source Oral, resp. rate 18, height 5' (1.524 m), weight 105 lb (47.6 kg), SpO2 94 %. General appearance:  Alert, responsive, oriented to person, place, and time. Far more comfortable appearing today. Head:  Normocephalic. No masses, lesions or tenderness. Eyes:  PERRLA. EOMI. Sclera clear. Buccal mucosa moist.  ENT:  Ears normal. Mucosa normal.  Neck:    Supple. Trachea midline. No thyromegaly. No JVD. No bruits. Heart:    Rhythm regular. Rate controlled. S1 and S2, systolic murmur  Lungs:    Symmetrical.  Diminished bibasilar. Clear to auscultation bilaterally. No wheezes. No rhonchi. No rales. Abdomen:   Soft. Less abdominal pain to palpation today. Bowel sounds are active. Extremities:    Peripheral pulses present. No peripheral edema. No ulcers. No cyanosis. No clubbing. Neurologic:    Alert x 3. No focal deficit. Cranial nerves grossly intact. No focal weakness. Psych:   Behavior is normal. Mood appears normal. Speech is not rapid and/or pressured. Musculoskeletal:   Spine ROM normal. Muscular strength intact. Gait not assessed. Integumentary:  No rashes  Skin normal color and texture.   Genitalia/Breast:  Deferred    Medication:  Scheduled Meds:   [START ON 4/1/2021] pantoprazole  40 mg Oral QAM AC    ketorolac  15 mg Intravenous Q6H    acetaminophen  1,000 mg Oral 3 times per day    lidocaine  1 patch Transdermal Daily    aspirin  81 mg Oral Daily    atorvastatin  20 mg Oral Daily  calcium carbonate  2 tablet Oral Daily    ferrous sulfate  325 mg Oral BID     levothyroxine  75 mcg Oral Daily    metoprolol succinate  25 mg Oral Daily    mirtazapine  30 mg Oral Nightly    rOPINIRole  1 mg Oral Nightly    sertraline  50 mg Oral Daily    pyridoxine  50 mg Oral Daily    sodium chloride flush  10 mL Intravenous 2 times per day    enoxaparin  30 mg Subcutaneous Daily     Continuous Infusions:   0.9% NaCl with KCl 40 mEq 60 mL/hr at 03/30/21 1657    sodium chloride         Objective Data:  CBC with Differential:    Lab Results   Component Value Date    WBC 7.1 03/31/2021    RBC 2.54 03/31/2021    HGB 8.1 03/31/2021    HCT 25.5 03/31/2021     03/31/2021    .4 03/31/2021    MCH 31.9 03/31/2021    MCHC 31.8 03/31/2021    RDW 13.2 03/31/2021    NRBC 1.0 10/05/2020    SEGSPCT 62 02/09/2013    LYMPHOPCT 8.7 03/26/2021    MONOPCT 4.1 03/26/2021    MYELOPCT 1.0 10/05/2020    BASOPCT 0.4 03/26/2021    MONOSABS 0.40 03/26/2021    LYMPHSABS 0.86 03/26/2021    EOSABS 0.00 03/26/2021    BASOSABS 0.04 03/26/2021     BMP:    Lab Results   Component Value Date     03/31/2021    K 5.0 03/31/2021    K 5.2 03/27/2021     03/31/2021    CO2 17 03/31/2021    BUN 18 03/31/2021    LABALBU 2.3 03/31/2021    LABALBU 3.5 06/09/2011    CREATININE 1.1 03/31/2021    CALCIUM 7.5 03/31/2021    GFRAA 58 03/31/2021    LABGLOM 48 03/31/2021    GLUCOSE 102 03/31/2021    GLUCOSE 167 06/10/2011       Assessment:  1. Enteritis/colitis with small bowel obstruction status post laparoscopic converted to open small bowel resection  2. Acute postoperative anemia with downward trend  3. Chronic compensated diastolic congestive heart failure with hyperdynamic function and ejection fraction of 80%  4. Chronic kidney disease stage III  5. Hyperlipidemia   6. Hypothyroidism   7. Gastroesophageal reflux disease with hiatal hernia  8.  Moderate protein calorie malnutrition    Plan:   Emma did better with

## 2021-03-31 NOTE — PROGRESS NOTES
GENERAL SURGERY  DAILY PROGRESS NOTE  3/31/2021    Chief Complaint   Patient presents with    Abdominal Pain     starting a few hours ago,  Upper Middle abdomen.  Nausea     when pain started    Excessive Sweating     when pain started        Subjective:  She is feeling much better this morning than she did in days prior. Pain medication is working for her. She has been tolerating clears and continues to have bowel movements. Objective:  BP (!) 142/68   Pulse 70   Temp 97.9 °F (36.6 °C) (Oral)   Resp 18   Ht 5' (1.524 m)   Wt 105 lb (47.6 kg)   SpO2 94%   BMI 20.51 kg/m²     GENERAL:  Laying in bed, awake, alert, cooperative, no apparent distress  LUNGS:  No increased work of breathing  CARDIOVASCULAR:  RR  ABDOMEN:  Soft, diffusely tender, ND, incision CDI  EXTREMITIES: No edema or swelling  SKIN: Warm and dry    Assessment/Plan:  Corey Bob is a 67 y.o. female postop day 3 laparoscopic assisted small bowel resection for closed-loop obstruction    Advance to mechanical soft diet (recent dental surgery)  Hgb stable, Cr improved  Multimodal pain control   OOB, ambulate  DC planning     Electronically signed by Doug Wallace DO on 3/31/2021 at 8:20 AM    As above. Monitor melena.

## 2021-03-31 NOTE — PLAN OF CARE
Problem: Pain:  Goal: Pain level will decrease  Description: Pain level will decrease  3/31/2021 1111 by Emanuel Mckeon RN  Outcome: Met This Shift     Problem: Pain:  Goal: Control of acute pain  Description: Control of acute pain  3/31/2021 1111 by Emanuel Mckeon RN  Outcome: Met This Shift     Problem: Falls - Risk of:  Goal: Will remain free from falls  Description: Will remain free from falls  3/31/2021 1111 by Emanuel Mckeon RN  Outcome: Met This Shift

## 2021-04-01 LAB
ALBUMIN SERPL-MCNC: 2.7 G/DL (ref 3.5–5.2)
ALP BLD-CCNC: 61 U/L (ref 35–104)
ALT SERPL-CCNC: 10 U/L (ref 0–32)
ANION GAP SERPL CALCULATED.3IONS-SCNC: 10 MMOL/L (ref 7–16)
AST SERPL-CCNC: 22 U/L (ref 0–31)
BILIRUB SERPL-MCNC: <0.2 MG/DL (ref 0–1.2)
BUN BLDV-MCNC: 16 MG/DL (ref 8–23)
CALCIUM SERPL-MCNC: 7.9 MG/DL (ref 8.6–10.2)
CHLORIDE BLD-SCNC: 106 MMOL/L (ref 98–107)
CO2: 19 MMOL/L (ref 22–29)
CREAT SERPL-MCNC: 1.3 MG/DL (ref 0.5–1)
GFR AFRICAN AMERICAN: 48
GFR NON-AFRICAN AMERICAN: 40 ML/MIN/1.73
GLUCOSE BLD-MCNC: 98 MG/DL (ref 74–99)
HCT VFR BLD CALC: 28 % (ref 34–48)
HCT VFR BLD CALC: 28.3 % (ref 34–48)
HEMOGLOBIN: 8.6 G/DL (ref 11.5–15.5)
HEMOGLOBIN: 9.1 G/DL (ref 11.5–15.5)
MCH RBC QN AUTO: 31.6 PG (ref 26–35)
MCHC RBC AUTO-ENTMCNC: 32.2 % (ref 32–34.5)
MCV RBC AUTO: 98.3 FL (ref 80–99.9)
PDW BLD-RTO: 13.3 FL (ref 11.5–15)
PLATELET # BLD: 322 E9/L (ref 130–450)
PMV BLD AUTO: 9.6 FL (ref 7–12)
POTASSIUM SERPL-SCNC: 4.2 MMOL/L (ref 3.5–5)
RBC # BLD: 2.88 E12/L (ref 3.5–5.5)
SODIUM BLD-SCNC: 135 MMOL/L (ref 132–146)
TOTAL PROTEIN: 5 G/DL (ref 6.4–8.3)
WBC # BLD: 8.2 E9/L (ref 4.5–11.5)

## 2021-04-01 PROCEDURE — 6370000000 HC RX 637 (ALT 250 FOR IP): Performed by: SURGERY

## 2021-04-01 PROCEDURE — 85027 COMPLETE CBC AUTOMATED: CPT

## 2021-04-01 PROCEDURE — 97530 THERAPEUTIC ACTIVITIES: CPT

## 2021-04-01 PROCEDURE — 1200000000 HC SEMI PRIVATE

## 2021-04-01 PROCEDURE — 6360000002 HC RX W HCPCS: Performed by: SURGERY

## 2021-04-01 PROCEDURE — 6370000000 HC RX 637 (ALT 250 FOR IP): Performed by: STUDENT IN AN ORGANIZED HEALTH CARE EDUCATION/TRAINING PROGRAM

## 2021-04-01 PROCEDURE — 85014 HEMATOCRIT: CPT

## 2021-04-01 PROCEDURE — 6360000002 HC RX W HCPCS: Performed by: STUDENT IN AN ORGANIZED HEALTH CARE EDUCATION/TRAINING PROGRAM

## 2021-04-01 PROCEDURE — 85018 HEMOGLOBIN: CPT

## 2021-04-01 PROCEDURE — 2580000003 HC RX 258: Performed by: SURGERY

## 2021-04-01 PROCEDURE — 36415 COLL VENOUS BLD VENIPUNCTURE: CPT

## 2021-04-01 PROCEDURE — 6370000000 HC RX 637 (ALT 250 FOR IP): Performed by: INTERNAL MEDICINE

## 2021-04-01 PROCEDURE — 80053 COMPREHEN METABOLIC PANEL: CPT

## 2021-04-01 RX ORDER — OXYCODONE HYDROCHLORIDE 5 MG/1
10 TABLET ORAL EVERY 4 HOURS PRN
Status: DISCONTINUED | OUTPATIENT
Start: 2021-04-01 | End: 2021-04-03 | Stop reason: HOSPADM

## 2021-04-01 RX ORDER — MORPHINE SULFATE 2 MG/ML
2 INJECTION, SOLUTION INTRAMUSCULAR; INTRAVENOUS
Status: DISCONTINUED | OUTPATIENT
Start: 2021-04-01 | End: 2021-04-03 | Stop reason: HOSPADM

## 2021-04-01 RX ORDER — OXYCODONE HYDROCHLORIDE 5 MG/1
5 TABLET ORAL EVERY 4 HOURS PRN
Status: DISCONTINUED | OUTPATIENT
Start: 2021-04-01 | End: 2021-04-03 | Stop reason: HOSPADM

## 2021-04-01 RX ORDER — ACETAMINOPHEN 325 MG/1
650 TABLET ORAL EVERY 6 HOURS
Status: DISCONTINUED | OUTPATIENT
Start: 2021-04-01 | End: 2021-04-03 | Stop reason: HOSPADM

## 2021-04-01 RX ADMIN — OXYCODONE 10 MG: 5 TABLET ORAL at 15:03

## 2021-04-01 RX ADMIN — FERROUS SULFATE TAB 325 MG (65 MG ELEMENTAL FE) 325 MG: 325 (65 FE) TAB at 08:31

## 2021-04-01 RX ADMIN — ATORVASTATIN CALCIUM 20 MG: 20 TABLET, FILM COATED ORAL at 08:31

## 2021-04-01 RX ADMIN — OXYCODONE 10 MG: 5 TABLET ORAL at 10:52

## 2021-04-01 RX ADMIN — LEVOTHYROXINE SODIUM 75 MCG: 75 TABLET ORAL at 06:10

## 2021-04-01 RX ADMIN — ROPINIROLE HYDROCHLORIDE 1 MG: 1 TABLET, FILM COATED ORAL at 21:10

## 2021-04-01 RX ADMIN — OXYCODONE 10 MG: 5 TABLET ORAL at 19:36

## 2021-04-01 RX ADMIN — ASPIRIN 81 MG: 81 TABLET, CHEWABLE ORAL at 08:31

## 2021-04-01 RX ADMIN — PANTOPRAZOLE SODIUM 40 MG: 40 TABLET, DELAYED RELEASE ORAL at 06:10

## 2021-04-01 RX ADMIN — ACETAMINOPHEN 650 MG: 325 TABLET, FILM COATED ORAL at 14:24

## 2021-04-01 RX ADMIN — HYDROMORPHONE HYDROCHLORIDE 0.5 MG: 1 INJECTION, SOLUTION INTRAMUSCULAR; INTRAVENOUS; SUBCUTANEOUS at 03:54

## 2021-04-01 RX ADMIN — ACETAMINOPHEN 1000 MG: 500 TABLET ORAL at 06:10

## 2021-04-01 RX ADMIN — OXYCODONE 10 MG: 5 TABLET ORAL at 23:37

## 2021-04-01 RX ADMIN — ACETAMINOPHEN 650 MG: 325 TABLET, FILM COATED ORAL at 21:10

## 2021-04-01 RX ADMIN — CALCIUM CARBONATE (ANTACID) CHEW TAB 500 MG 1000 MG: 500 CHEW TAB at 08:31

## 2021-04-01 RX ADMIN — ACETAMINOPHEN 650 MG: 325 TABLET, FILM COATED ORAL at 08:31

## 2021-04-01 RX ADMIN — ENOXAPARIN SODIUM 30 MG: 30 INJECTION SUBCUTANEOUS at 08:32

## 2021-04-01 RX ADMIN — PYRIDOXINE HCL TAB 50 MG 50 MG: 50 TAB at 08:31

## 2021-04-01 RX ADMIN — MORPHINE SULFATE 2 MG: 2 INJECTION, SOLUTION INTRAMUSCULAR; INTRAVENOUS at 08:27

## 2021-04-01 RX ADMIN — SODIUM CHLORIDE, PRESERVATIVE FREE 10 ML: 5 INJECTION INTRAVENOUS at 21:10

## 2021-04-01 RX ADMIN — MIRTAZAPINE 30 MG: 15 TABLET, FILM COATED ORAL at 21:10

## 2021-04-01 RX ADMIN — SODIUM CHLORIDE, PRESERVATIVE FREE 10 ML: 5 INJECTION INTRAVENOUS at 08:28

## 2021-04-01 RX ADMIN — FERROUS SULFATE TAB 325 MG (65 MG ELEMENTAL FE) 325 MG: 325 (65 FE) TAB at 17:22

## 2021-04-01 RX ADMIN — SERTRALINE 50 MG: 50 TABLET, FILM COATED ORAL at 08:31

## 2021-04-01 RX ADMIN — METOPROLOL SUCCINATE 25 MG: 25 TABLET, EXTENDED RELEASE ORAL at 08:31

## 2021-04-01 ASSESSMENT — PAIN DESCRIPTION - PAIN TYPE
TYPE: SURGICAL PAIN
TYPE: ACUTE PAIN
TYPE: SURGICAL PAIN

## 2021-04-01 ASSESSMENT — PAIN SCALES - GENERAL
PAINLEVEL_OUTOF10: 8
PAINLEVEL_OUTOF10: 9
PAINLEVEL_OUTOF10: 6
PAINLEVEL_OUTOF10: 8
PAINLEVEL_OUTOF10: 5
PAINLEVEL_OUTOF10: 6
PAINLEVEL_OUTOF10: 9
PAINLEVEL_OUTOF10: 8
PAINLEVEL_OUTOF10: 9
PAINLEVEL_OUTOF10: 9
PAINLEVEL_OUTOF10: 8
PAINLEVEL_OUTOF10: 7
PAINLEVEL_OUTOF10: 9

## 2021-04-01 ASSESSMENT — PAIN DESCRIPTION - DESCRIPTORS: DESCRIPTORS: ACHING;CONSTANT;DISCOMFORT

## 2021-04-01 ASSESSMENT — PAIN DESCRIPTION - ONSET
ONSET: ON-GOING
ONSET: ON-GOING

## 2021-04-01 ASSESSMENT — PAIN DESCRIPTION - PROGRESSION
CLINICAL_PROGRESSION: NOT CHANGED

## 2021-04-01 ASSESSMENT — PAIN DESCRIPTION - LOCATION
LOCATION: ABDOMEN

## 2021-04-01 ASSESSMENT — PAIN DESCRIPTION - ORIENTATION
ORIENTATION: MID
ORIENTATION: MID

## 2021-04-01 ASSESSMENT — PAIN DESCRIPTION - FREQUENCY
FREQUENCY: CONTINUOUS
FREQUENCY: CONTINUOUS

## 2021-04-01 NOTE — PROGRESS NOTES
GENERAL SURGERY  DAILY PROGRESS NOTE  4/1/2021    Chief Complaint   Patient presents with    Abdominal Pain     starting a few hours ago,  Upper Middle abdomen.  Nausea     when pain started    Excessive Sweating     when pain started        Subjective:  Feeling well. Still with abdominal pain at the lower aspect of the incision. +BMx4    Objective:  BP (!) 155/71   Pulse 72   Temp 97.9 °F (36.6 °C) (Oral)   Resp 18   Ht 5' (1.524 m)   Wt 105 lb (47.6 kg)   SpO2 97%   BMI 20.51 kg/m²     GENERAL:  Laying in bed, awake, alert, cooperative, no apparent distress  LUNGS:  No increased work of breathing  CARDIOVASCULAR:  RR  ABDOMEN:  Soft, tender at inferior aspect of incision, ND, incision CDI  EXTREMITIES: No edema or swelling  SKIN: Warm and dry    Assessment/Plan:  Milton Garcia is a 67 y.o. female postop day 3 laparoscopic assisted small bowel resection for closed-loop obstruction    Continue mechanical soft diet (recent dental surgery)  Hgb stable, Cr improved  Multimodal pain control   OOB, ambulate  DC planning     Electronically signed by Debora Sparrow DO on 4/1/2021 at 7:29 AM     As above.

## 2021-04-01 NOTE — CARE COORDINATION
Ss note: 4/1/2021.1:38 PM No covid testing. Follow up visit to pts room to confirm discharge address. Pt relays she will be staying with her sister, Maury Peña at [address] [facility]. [facility] has accepted & has orders. Nilda contact numbers are [phone] or waug [phone].  CAROLINA Mariscal

## 2021-04-01 NOTE — CARE COORDINATION
Ss note:4/1/2021.9:42 AM No covid testing. Pt plans to return home with son or may stay with her sister who resides nearby. Per charting, [facility] has accepted pt, orders noted and referral completed to central intake, orders faxed.  CAROLINA Desouza

## 2021-04-01 NOTE — PROGRESS NOTES
Internal Medicine Progress Note    [facility]    Ailyn Sherwood, SJOJOEY Ball D.O., DEBI Patrick Cap, MSN, APRN, NP-C  Yrn aMldonado. Orestes Stuart, MSN, APRN-CNP     Primary Care Physician: Rubi Medeiros MD   Admitting Physician:  Libra Ibrahim MD  Admission date and time: 3/26/2021  2:36 PM    Room:  [address] [place] [address]  Admitting diagnosis: Enteritis [K52.9]    Patient Name: Rita Robles  MRN: [id number]    Date of Service: 4/1/2021     Subjective:  Jeaneth Garcia is a 67 y.o. female who was seen and examined today,4/1/2021, at the bedside. Jeaneth Garcia was evaluated in the presence of her sister today. She is feeling better overall but continues to complain of abdominal pain that oftentimes limits her ability to ambulate. She voiced concern regarding returning home secondary to the pain. We discussed other options such as temporary skilled nursing facility placement and she defers. I reinforced that she is doing well and that she needs to become more active today. She and her sister voiced understanding and agreement. Review of System:   Constitutional:   Admits to improving malaise and fatigue. HEENT:   Denies ear pain, sore throat, sinus or eye problems. Cardiovascular:   Denies any chest pain, irregular heartbeats, or palpitations. Respiratory:   Denies shortness of breath, coughing, sputum production, hemoptysis, or wheezing. Gastrointestinal:   Abdominal pain persists but is improving on a daily basis. Genitourinary:    Denies any urgency, frequency, hematuria. Voiding without difficulty. Extremities:   Denies lower extremity swelling, edema or cyanosis. Neurology:    Denies any headache or focal neurological deficits, Denies generalized weakness or memory difficulty. Psch:   Denies being anxious or depressed. Musculoskeletal:    Denies  myalgias, joint complaints or back pain.    Integumentary:   Denies any rashes, ulcers, or excoriations. Denies bruising. Hematologic/Lymphatic:  Denies bruising or bleeding. Physical Exam:  No intake/output data recorded. Intake/Output Summary (Last 24 hours) at 4/1/2021 0833  Last data filed at 3/31/2021 1924  Gross per 24 hour   Intake 140 ml   Output    Net 140 ml   I/O last 3 completed shifts: In: 140 [P.O.:140]  Out: -   No data found. Vital Signs:   Blood pressure 137/68, pulse 78, temperature 98.6 °F (37 °C), temperature source Oral, resp. rate 18, height 5' (1.524 m), weight 105 lb (47.6 kg), SpO2 92 %. General appearance:  Alert, responsive, oriented to person, place, and time. Far more comfortable appearing today. Head:  Normocephalic. No masses, lesions or tenderness. Eyes:  PERRLA. EOMI. Sclera clear. Buccal mucosa moist.  ENT:  Ears normal. Mucosa normal.  Neck:    Supple. Trachea midline. No thyromegaly. No JVD. No bruits. Heart:    Rhythm regular. Rate controlled. S1 and S2, systolic murmur  Lungs:    Symmetrical.  Diminished bibasilar. Clear to auscultation bilaterally. No wheezes. No rhonchi. No rales. Abdomen:   Soft. Less abdominal pain to palpation today. Bowel sounds are active. Extremities:    Peripheral pulses present. No peripheral edema. No ulcers. No cyanosis. No clubbing. Neurologic:    Alert x 3. No focal deficit. Cranial nerves grossly intact. No focal weakness. Psych:   Behavior is normal. Mood appears normal. Speech is not rapid and/or pressured. Musculoskeletal:   Spine ROM normal. Muscular strength intact. Gait not assessed. Integumentary:  No rashes  Skin normal color and texture.   Genitalia/Breast:  Deferred    Medication:  Scheduled Meds:   acetaminophen  650 mg Oral Q6H    pantoprazole  40 mg Oral QAM AC    lidocaine  1 patch Transdermal Daily    aspirin  81 mg Oral Daily    atorvastatin  20 mg Oral Daily    calcium carbonate  2 tablet Oral Daily    ferrous sulfate  325 mg Oral BID WC    levothyroxine 75 mcg Oral Daily    metoprolol succinate  25 mg Oral Daily    mirtazapine  30 mg Oral Nightly    rOPINIRole  1 mg Oral Nightly    sertraline  50 mg Oral Daily    pyridoxine  50 mg Oral Daily    sodium chloride flush  10 mL Intravenous 2 times per day    enoxaparin  30 mg Subcutaneous Daily     Continuous Infusions:   sodium chloride         Objective Data:  CBC with Differential:    Lab Results   Component Value Date    WBC 7.1 03/31/2021    RBC 2.54 03/31/2021    HGB 8.6 04/01/2021    HCT 28.0 04/01/2021     03/31/2021    .4 03/31/2021    MCH 31.9 03/31/2021    MCHC 31.8 03/31/2021    RDW 13.2 03/31/2021    NRBC 1.0 10/05/2020    SEGSPCT 62 02/09/2013    LYMPHOPCT 8.7 03/26/2021    MONOPCT 4.1 03/26/2021    MYELOPCT 1.0 10/05/2020    BASOPCT 0.4 03/26/2021    MONOSABS 0.40 03/26/2021    LYMPHSABS 0.86 03/26/2021    EOSABS 0.00 03/26/2021    BASOSABS 0.04 03/26/2021     BMP:    Lab Results   Component Value Date     03/31/2021    K 5.0 03/31/2021    K 5.2 03/27/2021     03/31/2021    CO2 17 03/31/2021    BUN 18 03/31/2021    LABALBU 2.3 03/31/2021    LABALBU 3.5 06/09/2011    CREATININE 1.1 03/31/2021    CALCIUM 7.5 03/31/2021    GFRAA 58 03/31/2021    LABGLOM 48 03/31/2021    GLUCOSE 102 03/31/2021    GLUCOSE 167 06/10/2011       Assessment:  1. Enteritis/colitis with small bowel obstruction status post laparoscopic converted to open small bowel resection  2. Acute postoperative anemia with downward trend  3. Chronic compensated diastolic congestive heart failure with hyperdynamic function and ejection fraction of 80%  4. Chronic kidney disease stage III  5. Hyperlipidemia   6. Hypothyroidism   7. Gastroesophageal reflux disease with hiatal hernia  8. Moderate protein calorie malnutrition    Plan: At this point, the patient is fearful to return home secondary to the abdominal pain.   I reinforced her improving laboratory values and vital signs and her need to become more active with the therapy teams. She is having adequate bowel movements and is tolerating a diet without complication. I offered the opportunity to pursue temporary skilled nursing facility placement and she declined. I anticipate discharging the patient home tomorrow and I have encouraged the patient to become as ambulatory today as possible. Her sister will also help motivate her. We will continue to monitor chronic comorbidities and all medications have been transitioned to oral.    More than 50% of my  time was spent at the bedside counseling/coordinating care with the patient and/or family with face to face contact. This time was spent reviewing notes and laboratory data as well as instructing and counseling the patient. Time I spent with the family or surrogate(s) is included only if the patient was incapable of providing the necessary information or participating in medical decisions. I also discussed the differential diagnosis and all of the proposed management plans with the patient and individuals accompanying the patient. [place] requires this high level of physician care and nursing on the IMC/Telemetry unit due the complexity of decision management and chance of rapid decline or death. Continued cardiac monitoring and higher level of nursing are required. I am readily available for any further decision-making and intervention.      Luis Antonio Manrique DO  4/1/2021  8:33 AM

## 2021-04-01 NOTE — PROGRESS NOTES
Physical Therapy Treatment Note    Room #:  1243/6929-37  Patient Name: Lora Holley  YOB: 1945  MRN: [id number]    Referring Provider:   Diane Hoff DO      Date of Service: 4/1/2021    Evaluating Physical Therapist: Radha Lima, PT #[id number]       Diagnosis:   Enteritis [K52.9]    abodminal pain,  nausea and dry heaving but no significant vomiting     Patient Active Problem List   Diagnosis    Arthritis, hip    Avascular necrosis of bone of hip ([address]    HLD (hyperlipidemia)    Migraine    PUD (peptic ulcer disease)    Hypothyroid    Transient cerebral ischemia    [place] (acute kidney injury) ([address]    Acute on chronic diastolic (congestive) heart failure ([address]    CHF (congestive heart failure), NYHA class I, acute on chronic, combined (HCC)    Precordial pain    SOB (shortness of breath)    Enteritis    Small bowel obstruction ([address]        Tentative placement recommendation: Subacute vs Home Health Physical Therapy if patient meets goals    Equipment recommendation: Patient has needed equipment       Prior Level of Function: Patient ambulated independently  drives  Rehab Potential: good    for baseline    Past medical history:   Past Medical History:   Diagnosis Date    Acute on chronic diastolic (congestive) heart failure ([address] 9/29/2020    Arthritis     GERD (gastroesophageal reflux disease)     H/O cardiovascular stress test 10/03/2020    Lexiscan    History of blood transfusion     Hyperlipidemia     Migraines     Thyroid disease      Past Surgical History:   Procedure Laterality Date    COLECTOMY N/A 3/27/2021    LAPAROSCOPIC CONVERT TO OPEN SMALL BOWEL RESECTION performed by Ivonne Mcbride MD at [address] [place] [address],[facility] COLONOSCOPY      ENDOSCOPY, COLON, DIAGNOSTIC      FRACTURE SURGERY      left wrist, 2010-fx left hip    HYSTERECTOMY      JOINT REPLACEMENT      bilat knees    OTHER SURGICAL HISTORY Left 2-7-13    removal of hardware left hip left hip arthroplasty Precautions: Up as tolerated, falls , s/p LAPAROSCOPIC CONVERT TO OPEN SMALL BOWEL RESECTION    SUBJECTIVE:    Social history: Patient lives with son   in a two story home bedroom 2nd floor  with 5 steps  to enter with [employer] in shower first floor, tub shower 2nd floor      Equipment owned: Wheelchair, Cane, [address] and [address] chair,       [address]   How much difficulty turning over in bed?: A Lot  How much difficulty sitting down on / standing up from a chair with arms?: A Lot  How much difficulty moving from lying on back to sitting on side of bed?: A Lot  How much help from another person moving to and from a bed to a chair?: A Lot  How much help from another person needed to walk in hospital room?: A Lot  How much help from another person for climbing 3-5 steps with a railing?: Total  AM-PAC Inpatient Mobility Raw Score : 11  AM-PAC Inpatient T-Scale Score : 33.86  Mobility Inpatient CMS 0-100% Score: 72.57  Mobility Inpatient CMS G-Code Modifier : CL    Nursing cleared patient for PT treatment. OBJECTIVE;   Initial Evaluation  Date: 3/30/2021 Treatment Date:  4/1/2021     Short Term/ Long Term   Goals   Was pt agreeable to Eval/treatment? Yes   yes To be met in 3 days   Pain level   7/10  abdomen C/o abdominal pain    Bed Mobility    Rolling: Minimal assist of 1    Supine to sit: Minimal assist of 1    Sit to supine: Not assessed     Scooting: Minimal assist of 1   Rolling: Minimal assist of 1  Supine to sit: Minimal assist of 1   Sit to supine: Minimal assist of 1   Scooting: Minimal assist of 1    Rolling: Independent    Supine to sit:  Independent    Sit to supine: Independent    Scooting: Independent     Transfers Sit to stand: Minimal assist of 1 Cues for hand placement and safety  Sit to stand: Not assessed       Sit to stand: Independent     Ambulation    45 feet using  wheeled walker with Minimal assist of 1     not assessed    100 feet using  wheeled walker with Modified Independent    Stair negotiation: ascended and descended   Not assessed       10 steps 1 rail with supervision    ROM Within functional limits        Strength BUE:  refer to OT rosa  RLE:  4-/5  LLE:  4-/5   Increase strength in affected mm groups by 1/3 grade   Balance Sitting EOB:  good    Dynamic Standing:  fair   Sitting EOB: good   Dynamic Standing: not assessed    Sitting EOB:  good    Dynamic Standing: good       Patient is Alert & Oriented x person, place, time and situation and follows directions    Sensation:  Patient  denies numbness and tingling     Edema:  none noted    Endurance: fair       Vitals: room air    Blood Pressure at rest   Blood Pressure during session     Heart Rate at rest Heart Rate during session     SPO2 at rest   SPO2 during session   during gait room air,  after therapy room air     Patient education  Patient educated on role of Physical Therapy, risks of immobility, safety and plan of care,  importance of mobility while in hospital  and positioning for skin integrity and comfort      Patient response to education:   Pt verbalized understanding Pt demonstrated skill Pt requires further education in this area   Yes Partial Yes      Treatment:  Patient practiced and was instructed/facilitated in the following treatment: Patient assisted to edge of bed,   Sat edge of bed 5 minutes with Supervision  to increase dynamic sitting balance and activity tolerance. Pt able to complete LE exercises before rtb d/t abdominal pain. Therapeutic Exercises:  ankle pumps and long arc quad x10        At end of session, patient in bed with   call light and phone within reach,   all lines and tubes intact, nursing notified. Patient would benefit from continued skilled Physical Therapy to improve functional independence and quality of life.           Patient's/ family goals   home        ASSESSMENT: Patient exhibits decreased strength, balance, endurance and pain abdomen impairing functional mobility, transfers, gait , gait distance and tolerance to activity       Plan of Care:     -Bed Mobility: Lower extremity exercises   -Sitting Balance: Incorporate reaching activities to activate trunk muscles   -Standing Balance: Perform strengthening exercises in standing to promote motor control with or without upper extremity support   -Transfers: Cues for hand placement, technique and safety  -Gait: Gait training and Standing activities to improve: base of support, weight shift, weight bearing    -Endurance: Utilize Supervised activities to increase level of endurance to allow for safe functional mobility including transfers and gait   -Stairs: Stair training with instruction on proper technique and hand placement on rail    Patient and or family understand(s) diagnosis, prognosis, and plan of care. Frequency of treatments: Patient will be seen  daily.        Time in  200  Time out  210    Total Treatment Time  10 minutes  CPT codes:  Therapeutic activities (23216)   10 minutes  1 unit(s)    Samson Holstein, PTA [facility]#[id number]

## 2021-04-02 LAB
ALBUMIN SERPL-MCNC: 2.5 G/DL (ref 3.5–5.2)
ALP BLD-CCNC: 56 U/L (ref 35–104)
ALT SERPL-CCNC: 11 U/L (ref 0–32)
ANION GAP SERPL CALCULATED.3IONS-SCNC: 11 MMOL/L (ref 7–16)
AST SERPL-CCNC: 19 U/L (ref 0–31)
BILIRUB SERPL-MCNC: <0.2 MG/DL (ref 0–1.2)
BUN BLDV-MCNC: 15 MG/DL (ref 8–23)
CALCIUM SERPL-MCNC: 7.8 MG/DL (ref 8.6–10.2)
CHLORIDE BLD-SCNC: 108 MMOL/L (ref 98–107)
CO2: 20 MMOL/L (ref 22–29)
CREAT SERPL-MCNC: 1.3 MG/DL (ref 0.5–1)
GFR AFRICAN AMERICAN: 48
GFR NON-AFRICAN AMERICAN: 40 ML/MIN/1.73
GLUCOSE BLD-MCNC: 101 MG/DL (ref 74–99)
HCT VFR BLD CALC: 24.9 % (ref 34–48)
HEMOGLOBIN: 8.3 G/DL (ref 11.5–15.5)
MCH RBC QN AUTO: 32.2 PG (ref 26–35)
MCHC RBC AUTO-ENTMCNC: 33.3 % (ref 32–34.5)
MCV RBC AUTO: 96.5 FL (ref 80–99.9)
PDW BLD-RTO: 13.2 FL (ref 11.5–15)
PLATELET # BLD: 342 E9/L (ref 130–450)
PMV BLD AUTO: 9.6 FL (ref 7–12)
POTASSIUM SERPL-SCNC: 4 MMOL/L (ref 3.5–5)
RBC # BLD: 2.58 E12/L (ref 3.5–5.5)
SODIUM BLD-SCNC: 139 MMOL/L (ref 132–146)
TOTAL PROTEIN: 4.6 G/DL (ref 6.4–8.3)
WBC # BLD: 8.7 E9/L (ref 4.5–11.5)

## 2021-04-02 PROCEDURE — 97116 GAIT TRAINING THERAPY: CPT | Performed by: PHYSICAL THERAPIST

## 2021-04-02 PROCEDURE — 85027 COMPLETE CBC AUTOMATED: CPT

## 2021-04-02 PROCEDURE — 80053 COMPREHEN METABOLIC PANEL: CPT

## 2021-04-02 PROCEDURE — 6370000000 HC RX 637 (ALT 250 FOR IP): Performed by: STUDENT IN AN ORGANIZED HEALTH CARE EDUCATION/TRAINING PROGRAM

## 2021-04-02 PROCEDURE — 36415 COLL VENOUS BLD VENIPUNCTURE: CPT

## 2021-04-02 PROCEDURE — 97112 NEUROMUSCULAR REEDUCATION: CPT

## 2021-04-02 PROCEDURE — 97530 THERAPEUTIC ACTIVITIES: CPT

## 2021-04-02 PROCEDURE — 97112 NEUROMUSCULAR REEDUCATION: CPT | Performed by: PHYSICAL THERAPIST

## 2021-04-02 PROCEDURE — 6370000000 HC RX 637 (ALT 250 FOR IP): Performed by: SURGERY

## 2021-04-02 PROCEDURE — 6370000000 HC RX 637 (ALT 250 FOR IP): Performed by: INTERNAL MEDICINE

## 2021-04-02 PROCEDURE — 6360000002 HC RX W HCPCS: Performed by: SURGERY

## 2021-04-02 PROCEDURE — 1200000000 HC SEMI PRIVATE

## 2021-04-02 PROCEDURE — 2580000003 HC RX 258: Performed by: SURGERY

## 2021-04-02 RX ORDER — OXYCODONE HYDROCHLORIDE 5 MG/1
5 TABLET ORAL EVERY 6 HOURS PRN
Qty: 28 TABLET | Refills: 0 | Status: SHIPPED | OUTPATIENT
Start: 2021-04-02 | End: 2021-04-09

## 2021-04-02 RX ORDER — ONDANSETRON 4 MG/1
4 TABLET, ORALLY DISINTEGRATING ORAL EVERY 8 HOURS PRN
Qty: 20 TABLET | Refills: 0 | Status: SHIPPED | OUTPATIENT
Start: 2021-04-02

## 2021-04-02 RX ADMIN — OXYCODONE 10 MG: 5 TABLET ORAL at 20:51

## 2021-04-02 RX ADMIN — ONDANSETRON 4 MG: 2 INJECTION INTRAMUSCULAR; INTRAVENOUS at 09:26

## 2021-04-02 RX ADMIN — FERROUS SULFATE TAB 325 MG (65 MG ELEMENTAL FE) 325 MG: 325 (65 FE) TAB at 09:41

## 2021-04-02 RX ADMIN — PYRIDOXINE HCL TAB 50 MG 50 MG: 50 TAB at 11:08

## 2021-04-02 RX ADMIN — MIRTAZAPINE 30 MG: 15 TABLET, FILM COATED ORAL at 20:51

## 2021-04-02 RX ADMIN — METOPROLOL SUCCINATE 25 MG: 25 TABLET, EXTENDED RELEASE ORAL at 09:41

## 2021-04-02 RX ADMIN — CALCIUM CARBONATE (ANTACID) CHEW TAB 500 MG 1000 MG: 500 CHEW TAB at 09:42

## 2021-04-02 RX ADMIN — ASPIRIN 81 MG: 81 TABLET, CHEWABLE ORAL at 09:42

## 2021-04-02 RX ADMIN — ACETAMINOPHEN 650 MG: 325 TABLET, FILM COATED ORAL at 20:52

## 2021-04-02 RX ADMIN — FERROUS SULFATE TAB 325 MG (65 MG ELEMENTAL FE) 325 MG: 325 (65 FE) TAB at 17:30

## 2021-04-02 RX ADMIN — OXYCODONE 10 MG: 5 TABLET ORAL at 03:43

## 2021-04-02 RX ADMIN — OXYCODONE 10 MG: 5 TABLET ORAL at 09:34

## 2021-04-02 RX ADMIN — PANTOPRAZOLE SODIUM 40 MG: 40 TABLET, DELAYED RELEASE ORAL at 05:49

## 2021-04-02 RX ADMIN — ATORVASTATIN CALCIUM 20 MG: 20 TABLET, FILM COATED ORAL at 09:42

## 2021-04-02 RX ADMIN — ROPINIROLE HYDROCHLORIDE 1 MG: 1 TABLET, FILM COATED ORAL at 20:52

## 2021-04-02 RX ADMIN — ACETAMINOPHEN 650 MG: 325 TABLET, FILM COATED ORAL at 13:49

## 2021-04-02 RX ADMIN — SODIUM CHLORIDE, PRESERVATIVE FREE 10 ML: 5 INJECTION INTRAVENOUS at 20:52

## 2021-04-02 RX ADMIN — OXYCODONE 10 MG: 5 TABLET ORAL at 13:49

## 2021-04-02 RX ADMIN — SODIUM CHLORIDE, PRESERVATIVE FREE 10 ML: 5 INJECTION INTRAVENOUS at 09:27

## 2021-04-02 RX ADMIN — LEVOTHYROXINE SODIUM 75 MCG: 75 TABLET ORAL at 05:49

## 2021-04-02 RX ADMIN — ENOXAPARIN SODIUM 30 MG: 30 INJECTION SUBCUTANEOUS at 09:35

## 2021-04-02 RX ADMIN — SERTRALINE 50 MG: 50 TABLET, FILM COATED ORAL at 11:08

## 2021-04-02 RX ADMIN — ACETAMINOPHEN 650 MG: 325 TABLET, FILM COATED ORAL at 03:43

## 2021-04-02 ASSESSMENT — PAIN SCALES - GENERAL
PAINLEVEL_OUTOF10: 7
PAINLEVEL_OUTOF10: 3
PAINLEVEL_OUTOF10: 7
PAINLEVEL_OUTOF10: 7
PAINLEVEL_OUTOF10: 6
PAINLEVEL_OUTOF10: 7
PAINLEVEL_OUTOF10: 3
PAINLEVEL_OUTOF10: 4
PAINLEVEL_OUTOF10: 0

## 2021-04-02 ASSESSMENT — PAIN DESCRIPTION - PROGRESSION
CLINICAL_PROGRESSION: NOT CHANGED

## 2021-04-02 ASSESSMENT — PAIN DESCRIPTION - PAIN TYPE: TYPE: ACUTE PAIN

## 2021-04-02 ASSESSMENT — PAIN DESCRIPTION - ORIENTATION: ORIENTATION: MID

## 2021-04-02 ASSESSMENT — PAIN DESCRIPTION - DESCRIPTORS: DESCRIPTORS: ACHING;CONSTANT;DISCOMFORT

## 2021-04-02 ASSESSMENT — PAIN DESCRIPTION - ONSET: ONSET: ON-GOING

## 2021-04-02 ASSESSMENT — PAIN DESCRIPTION - LOCATION: LOCATION: ABDOMEN

## 2021-04-02 ASSESSMENT — PAIN DESCRIPTION - FREQUENCY: FREQUENCY: CONTINUOUS

## 2021-04-02 NOTE — PROGRESS NOTES
Physical Therapy Treatment Note    Room #:  5887/1144-48  Patient Name: Deanna Moore  YOB: 1945  MRN: [id number]    Referring Provider:   Elizabeth Martin DO      Date of Service: 4/2/2021    Evaluating Physical Therapist: Vera Pappas, PT #[id number]       Diagnosis:   Enteritis [K52.9]    abodminal pain,  nausea and dry heaving but no significant vomiting      Patient Active Problem List   Diagnosis    Arthritis, hip    Avascular necrosis of bone of hip ([address]    HLD (hyperlipidemia)    Migraine    PUD (peptic ulcer disease)    Hypothyroid    Transient cerebral ischemia    [place] (acute kidney injury) ([address]    Acute on chronic diastolic (congestive) heart failure ([address]    CHF (congestive heart failure), NYHA class I, acute on chronic, combined (HCC)    Precordial pain    SOB (shortness of breath)    Enteritis    Small bowel obstruction ([address]        Tentative placement recommendation: Subacute vs Home Health Physical Therapy if patient meets goals    Equipment recommendation: Patient has needed equipment       Prior Level of Function: Patient ambulated independently  drives  Rehab Potential: good    for baseline    Past medical history:   Past Medical History:   Diagnosis Date    Acute on chronic diastolic (congestive) heart failure ([address] 9/29/2020    Arthritis     GERD (gastroesophageal reflux disease)     H/O cardiovascular stress test 10/03/2020    Lexiscan    History of blood transfusion     Hyperlipidemia     Migraines     Thyroid disease      Past Surgical History:   Procedure Laterality Date    COLECTOMY N/A 3/27/2021    LAPAROSCOPIC CONVERT TO OPEN SMALL BOWEL RESECTION performed by Moy Pearson MD at [address] [place] [address] COLONOSCOPY      ENDOSCOPY, COLON, DIAGNOSTIC      FRACTURE SURGERY      left wrist, 2010-fx left hip    HYSTERECTOMY      JOINT REPLACEMENT      bilat knees    OTHER SURGICAL HISTORY Left 2-7-13    removal of hardware left hip left hip arthroplasty Precautions: Up as tolerated, falls , s/p LAPAROSCOPIC CONVERT TO OPEN SMALL BOWEL RESECTION; log roll    SUBJECTIVE:    Social history: Patient lives with son   in a two story home bedroom 2nd floor  with 5 steps  to enter with [employer] in shower first floor, tub shower 2nd floor      Equipment owned: Wheelchair, Cane, [address] and Shower chair,       [facility]   How much difficulty turning over in bed?: A Lot  How much difficulty sitting down on / standing up from a chair with arms?: A Little  How much difficulty moving from lying on back to sitting on side of bed?: A Lot  How much help from another person moving to and from a bed to a chair?: A Little  How much help from another person needed to walk in hospital room?: A Little  How much help from another person for climbing 3-5 steps with a railing?: A Lot  AM-PAC Inpatient Mobility Raw Score : 15  AM-PAC Inpatient T-Scale Score : 39.45  Mobility Inpatient CMS 0-100% Score: 57.7  Mobility Inpatient CMS G-Code Modifier : CK    Nursing cleared patient for PT treatment. OBJECTIVE;   Initial Evaluation  Date: 3/30/2021 Treatment Date:  4/2/2021     Short Term/ Long Term   Goals   Was pt agreeable to Eval/treatment? Yes   yes To be met in 3 days   Pain level   7/10  abdomen C/o abdominal pain    Bed Mobility    Rolling: Minimal assist of 1    Supine to sit: Minimal assist of 1    Sit to supine: Not assessed     Scooting: Minimal assist of 1   Rolling: Moderate assist of 1  Supine to sit: Moderate assist of 1   Sit to supine: Not assessed    Scooting: Minimal assist of 1    Rolling: Independent    Supine to sit:  Independent    Sit to supine: Independent    Scooting: Independent     Transfers Sit to stand: Minimal assist of 1 Cues for hand placement and safety  Sit to stand: Minimal assist of 1   from bed, commode and chair ; cues to focus on bilateral lower extremities lift     Sit to stand: Independent Ambulation    45 feet using  wheeled walker with Minimal assist of 1     2x75 feet using  wheeled walker with Minimal assist of 1   cues for safe walker approximation and turns    100 feet using  wheeled walker with Modified Independent    Stair negotiation: ascended and descended   Not assessed       10 steps 1 rail with supervision    ROM Within functional limits        Strength BUE:  refer to OT eval  RLE:  4-/5  LLE:  4-/5   Increase strength in affected mm groups by 1/3 grade   Balance Sitting EOB:  good    Dynamic Standing:  fair   Sitting EOB: good   Dynamic Standing: fair    Sitting EOB:  good    Dynamic Standing: good       Patient is Alert & Oriented x person, place, time and situation and follows directions    Sensation:  Patient  denies numbness and tingling     Edema:  none noted    Endurance: fair       Patient education  Patient educated on role of Physical Therapy, risks of immobility, safety and plan of care,  importance of mobility while in hospital  and positioning for skin integrity and comfort      Patient response to education:   Pt verbalized understanding Pt demonstrated skill Pt requires further education in this area   Yes Partial Yes      Treatment:  Patient practiced and was instructed/facilitated in the following treatment: Patient assisted to edge of bed,   Sat edge of bed 10 minutes with Supervision  to increase dynamic sitting balance and activity tolerance. Standing challenges along with gait; needing rest periods between activity d/t fatigue and pain; Therapeutic Exercises:  ankle pumps and long arc quad x10        At end of session, patient in chair with   call light and phone within reach,   all lines and tubes intact, nursing notified. Patient would benefit from continued skilled Physical Therapy to improve functional independence and quality of life.           Patient's/ family goals   home        ASSESSMENT: Patient exhibits decreased strength, balance, endurance and pain abdomen impairing functional mobility, transfers, gait , gait distance and tolerance to activity  are barriers to d/c and require skilled intervention during hospital stay   recommend home p.t. Plan of Care:     -Bed Mobility: Lower extremity exercises   -Sitting Balance: Incorporate reaching activities to activate trunk muscles   -Standing Balance: Perform strengthening exercises in standing to promote motor control with or without upper extremity support   -Transfers: Cues for hand placement, technique and safety  -Gait: Gait training and Standing activities to improve: base of support, weight shift, weight bearing    -Endurance: Utilize Supervised activities to increase level of endurance to allow for safe functional mobility including transfers and gait   -Stairs: Stair training with instruction on proper technique and hand placement on rail    Patient and or family understand(s) diagnosis, prognosis, and plan of care. Frequency of treatments: Patient will be seen  daily.        Time in  1146  Time out  1220    Total Treatment Time  34 minutes  CPT codes:  Gait Training [phone]) 20 minutes 1 unit(s)  Neuromuscular reeducation ([id number])   14 minutes  1 unit(s)    Michael Alvarez, PT

## 2021-04-02 NOTE — PROGRESS NOTES
GENERAL SURGERY  DAILY PROGRESS NOTE  4/2/2021    Chief Complaint   Patient presents with    Abdominal Pain     starting a few hours ago,  Upper Middle abdomen.  Nausea     when pain started    Excessive Sweating     when pain started        Subjective:  Feels significantly better today than she has been. States that she still has pain at the lower aspect of her incision but that it is mostly when she is moving around.      Objective:  BP (!) 149/67   Pulse 74   Temp 98.5 °F (36.9 °C) (Oral)   Resp 17   Ht 5' (1.524 m)   Wt 105 lb (47.6 kg)   SpO2 92%   BMI 20.51 kg/m²     GENERAL:  Laying in bed, awake, alert, cooperative, no apparent distress  LUNGS:  No increased work of breathing  CARDIOVASCULAR:  RR  ABDOMEN:  Soft, tender at inferior aspect of incision, ND, incision CDI  EXTREMITIES: No edema or swelling  SKIN: Warm and dry    Assessment/Plan:  Brian Glover is a 67 y.o. female postop day 3 laparoscopic assisted small bowel resection for closed-loop obstruction    Continue mechanical soft diet (recent dental surgery)  Hgb stable, Cr improved  Multimodal pain control   OOB, ambulate  DC planning- ok for dc home with [facility]    Electronically signed by Drake Nicholas DO on 4/2/2021 at 6:52 AM     As above, will keep tonight due to pain and d/c likely in am

## 2021-04-02 NOTE — PROGRESS NOTES
[facility] Quality Flow/Interdisciplinary Rounds Progress Note        Quality Flow Rounds held on April 2, 2021    Disciplines Attending:  Bedside Nurse, ,  and Nursing Unit Leadership    [facility]  2:36 PM    Anticipated Discharge Date:  Expected Discharge Date: 04/02/21    Disposition:    Kulwant Score:  Kulwant Scale Score: 20    Readmission Risk              Risk of Unplanned Readmission:        19           Discussed patient goal for the day, patient clinical progression, and barriers to discharge.   The following Goal(s) of the Day/Commitment(s) have been identified:  Activity progression, RA, plan is home with sister, prompt for Maninder Jorge  April 2, 2021

## 2021-04-02 NOTE — PROGRESS NOTES
Occupational Therapy  OT BEDSIDE TREATMENT NOTE      Date:2021  Patient Name: Phi Ramos  MRN: [id number]  : 1945  Room: [address] [place] [address]       Referring Provider: David Davila DO     Evaluating OT: Bryan Guthrie OTR/L #[id number]     AM-PAC Daily Activity Raw Score: 1624     Recommended Placement: Subacute rehab  Recommended Adaptive Equipment: TBD      Diagnosis:   1. Small bowel obstruction [facility])          Surgery:  3/27/21 -  LAPAROSCOPIC CONVERT TO OPEN SMALL BOWEL RESECTION     Pertinent Medical History:    Past Medical History        Past Medical History:   Diagnosis Date    Acute on chronic diastolic (congestive) heart failure ([facility] 2020    Arthritis      GERD (gastroesophageal reflux disease)      H/O cardiovascular stress test 10/03/2020     Lexiscan    History of blood transfusion      Hyperlipidemia      Migraines      Thyroid disease           Precautions:  Falls, alarm, abdominal precautions     Home Living: Pt lives with son in a 2 story home with bed on 2, bath on 1 and 2 with 4 [employer] with 1 rail(s). Bathroom setup: tub on 2, shower on 1   Equipment owned: w/c, cane, hospital bed, [employer], shower chair     Prior Level of Function: Independent with ADLs , Independent with IADLs; ambulated without AD  Driving: Yes  Occupation: Not reported     Pain Level: 10 abdomen. Received medication prior to session. Cognition: A&O: 4/4; Follows 2 step directions              Memory:  good              Sequencing:  Fair+              Problem solving:  Fair+              Judgement/safety:  Fair+                Functional Assessment:    Initial Eval Status  Date: 3/30/21 Treatment Status  Date: 31 STGs = LTGs  Time frame: 5-7 days   Feeding Independent      Independent     Grooming Minimal Assist    Minimal Assist  Standing, bilateral tasks while standing. Pt requiring steadying assist.  Independent    UB Dressing Minimal Assist   Pulled sleeves to shoulders and tie around back.  NT  Independent LB Dressing Maximal Assist   Don socks at bed level Moderate Assist  Adjusting socks while sitting EOB  Modified [place]    Bathing Maximal Assist     NT Modified [place]    Toileting Maximal Assist   Completed hygiene while standing at EOB  NT Modified [place]    Bed Mobility  Supine to sit: Moderate Assist   Sit to supine: Moderate Assist   Education/instruction on log rolling  Supine to sit: Minimal Assist   Sit to supine: Stand by Assist Supine to sit: Independent   Sit to supine: Independent    Functional Transfers Moderate Assist   Sit<>stand at [employer] level  Bed  Cuing on body mechanics, balance, posture, safety and breathing techniques Minimal Assist  Sit<>stand  Bed  Cuing on hand placement, body mechanics, balance and safety  Independent    Functional Mobility NT  Pt declined due to pain.     Minimal Assist  Using [employer]  40 ft x 2  Cuing on AD management, body mechanics, PLB, and safety. Attempted 3-4 steps with HHA, pt unsteady. Modified [place]    Balance Sitting:     Static:  fair    Dynamic:fair  Standing: fair  Sitting:     Static:  fair+    Dynamic:fair+  Pt sitting EOB ~7 minutes address sitting balance, endurance and core strength. Standing: fair Sitting:     Static:  good    Dynamic:good  Standing: good   Activity Tolerance Fair-  Fair  Limited due to pain Fair+   Visual/  Perceptual Glasses: yes   WFL          Hand Dominance Right       Strength ROM Additional Info:    RUE  4-/5  WFL good  and wfl FMC/dexterity noted during ADL tasks         LUE 4-/5  WFL good  and wfl FMC/dexterity noted during ADL tasks         Hearing: WFL   Sensation:  No c/o numbness or tingling   Tone: WFL   Edema: None noted    Comments:   Nursing approved therapy session. Upon arrival, patient supine in bed and agreeable to OT session. Therapist educated pt on role of OT. At end of session, patient supine in bed with call light and phone within reach, all lines and tubes intact; nursing aware. Pt demonstrated fair+ understanding of education/techniques and decreased independence and safety during completion of ADL/functional transfer/mobility tasks. Pt would benefit from continued skilled OT to increase safety and independence with completion of ADL/IADL tasks for functional independence and quality of life. Treatment:   Skilled occupational therapy services provided include instruction/training on safety and adapted techniques for completion of therapeutic activities. Skilled monitoring of O2 sats, HR, and pt response throughout treatment.  Therapist facilitated therapeutic activities: bed mobility, functional transfers, graded functional activities (static/dynamic sitting, static/dynamic standing, functional reaching), and functional ambulation - providing min cuing (verbal, visual, tactile) on AD management, hand placement, body mechanics, posture, breathing techniques, energy conservation, compensatory strategies, and safety.  Therapist facilitated neuromuscular reeducation to facilitate balance/righting reactions for increased function with ADLs tasks/facilitation of UE movement and proper movement patterns; and completion of therapeutic activities to facilitate fine motor function for completion of ADL tasks. Patient has made good progress toward goals.     Plan of Care: 1-3 days/week for 1-2 weeks PRN   Instruction/training on adapted ADL techniques and AE recommendations to increase functional independence within precautions  Training on energy conservation strategies/techniques to improve independence/tolerance for self-care routine  Functional transfer/mobility training/DME recommendations for increased independence, safety, and fall prevention  Patient/Family education to increase follow through with safety techniques and functional independence  Recommendation of environmental modifications for increased safety with functional transfers/mobility and ADLs  Therapeutic exercise to improve motor endurance, ROM, and functional strength for ADLs/functional transfers  Therapeutic activities to facilitate/challenge dynamic balance, stand tolerance, fine motor dexterity/in-hand manipulation for increased independence with ADLs  Neuro-muscular re-education: facilitation of righting/equilibrium reactions, midline orientation, scapular stability/mobility, normalization of muscle tone, and facilitation of volitional active controled movement              Time In: 1450  Time Out: 1513  Total Treatment Time: 23 minutes     Min Units   OT Eval Low 54992     OT Eval Medium 25024     OT Eval High 38387     OT Re-Eval [id number]     Therapeutic Ex 60944     Therapeutic Activities 80198 15 1   ADL/Self Care 61951     Orthotic Management 26301     Neuro Re-Ed 48310 8 1   Non-Billable Time     TOTAL TIMED TREATMENT 23 2       Evaluation time includes thorough review of current medical information, gathering information on past medical history/social history and prior level of function, completion of standardized testing/informal observation of tasks, assessment of data, and development of POC/Goals    Jessika Hernández OTR/L #[id number]

## 2021-04-02 NOTE — CARE COORDINATION
SS Note: No Covid testing. SW notified Michelle [facility], of [facility] [place] orders and confirmed [facility] has address of pt's sister where pt is staying upon discharge.   Electronically signed by CAROLINA Magdaleno on 4/2/2021 at 9:43 AM

## 2021-04-02 NOTE — PROGRESS NOTES
Internal Medicine Progress Note    [facility]    Junaid Wharton. Lashay Resendiz., F.A.C.O.I. Astrid Burt D.O., STEPHANIE Sorto D.O. Luis Wolf, MSN, APRN, NP-C  Kei Pate. Corinne Anon, MSN, APRN-CNP     Primary Care Physician: Gita Gonzales MD   Admitting Physician:  Fabricio Su MD  Admission date and time: 3/26/2021  2:36 PM    Room:  [address] [place] [address]  Admitting diagnosis: Enteritis [K52.9]    Patient Name: Fontaine Heimlich  MRN: [id number]    Date of Service: 4/2/2021     Subjective:  Vikram Matute is a 67 y.o. female who was seen and examined today,4/2/2021, at the bedside. Vikram Matute was evaluated in the presence of her sister today. We discussed that general surgery team had indicated that she was ready for discharge yesterday. We discussed discharge today. Patient states she really does not want to go because she has surgical abdominal pain. We discussed that she is going to have pain secondary to procedure and that this will continue to get better on a daily basis and that if she feels that she cannot go home we could have  see her and arrange for skilled nursing facility placement temporarily. Patient states now she prefers to go home with her sister. Her sister is present and states that her concern is that she lives alone therefore with her sister being there something should occur it could be problematic. Review of System:   Constitutional:   Admits to mild fatigue. No fever or chills. HEENT:   Denies ear pain, sore throat, sinus or eye problems. Cardiovascular:   Denies any chest pain, irregular heartbeats, or palpitations. Respiratory:   Denies shortness of breath, coughing, sputum production, hemoptysis, or wheezing. Gastrointestinal:   Admits to surgical abdominal pain. States she does have adequate pain control with current pain medication regimen. Admits to mild nausea this morning. No emesis.   Genitourinary: Denies any urgency, frequency, hematuria. Voiding without difficulty. Extremities:   Denies lower extremity swelling, edema or cyanosis. Neurology:    Denies any headache or focal neurological deficits, Denies generalized weakness or memory difficulty. Psch:   Denies being anxious or depressed. Musculoskeletal:    Denies  myalgias, joint complaints or back pain. Integumentary:   Denies any rashes, ulcers, or excoriations. Denies bruising. Hematologic/Lymphatic:  Denies bruising or bleeding. Physical Exam:  I/O this shift:  In: 110 [P.O.:100; I.V.:10]  Out: -     Intake/Output Summary (Last 24 hours) at 4/2/2021 1042  Last data filed at 4/2/2021 0927  Gross per 24 hour   Intake 290 ml   Output 0 ml   Net 290 ml   I/O last 3 completed shifts: In: 180 [P.O.:180]  Out: 0   No data found. Vital Signs:   Blood pressure 134/76, pulse 96, temperature 98.3 °F (36.8 °C), temperature source Oral, resp. rate 18, height 5' (1.524 m), weight 105 lb (47.6 kg), SpO2 91 %. General appearance:  Alert, responsive, oriented to person, place, and time. In no acute distress. Head:  Normocephalic. No masses, lesions or tenderness. Eyes:  PERRLA. EOMI. Sclera clear. Buccal mucosa moist.  ENT:  Ears normal. Mucosa normal.  Neck:    Supple. Trachea midline. No thyromegaly. No JVD. No bruits. Heart:    Rhythm regular. Rate controlled. S1 and S2, systolic murmur  Lungs:    Symmetrical.  Diminished bibasilar. Clear to auscultation bilaterally. No wheezes. No rhonchi. No rales. Abdomen:   Soft. Less abdominal pain to palpation today. Surgical changes noted. Bowel sounds are active. Extremities:    Peripheral pulses present. No peripheral edema. No ulcers. No cyanosis. No clubbing. Neurologic:    Alert x 3. No focal deficit. Cranial nerves grossly intact. No focal weakness. Psych:   Behavior is normal. Mood appears normal. Speech is not rapid and/or pressured.   Musculoskeletal:   Spine ROM normal. Muscular strength intact. Gait not assessed. Integumentary:  No rashes  Skin normal color and texture. Genitalia/Breast:  Deferred    Medication:  Scheduled Meds:   acetaminophen  650 mg Oral Q6H    pantoprazole  40 mg Oral QAM AC    lidocaine  1 patch Transdermal Daily    aspirin  81 mg Oral Daily    atorvastatin  20 mg Oral Daily    calcium carbonate  2 tablet Oral Daily    ferrous sulfate  325 mg Oral BID WC    levothyroxine  75 mcg Oral Daily    metoprolol succinate  25 mg Oral Daily    mirtazapine  30 mg Oral Nightly    rOPINIRole  1 mg Oral Nightly    sertraline  50 mg Oral Daily    pyridoxine  50 mg Oral Daily    sodium chloride flush  10 mL Intravenous 2 times per day    enoxaparin  30 mg Subcutaneous Daily     Continuous Infusions:   sodium chloride         Objective Data:  CBC with Differential:    Lab Results   Component Value Date    WBC 8.7 04/02/2021    RBC 2.58 04/02/2021    HGB 8.3 04/02/2021    HCT 24.9 04/02/2021     04/02/2021    MCV 96.5 04/02/2021    MCH 32.2 04/02/2021    MCHC 33.3 04/02/2021    RDW 13.2 04/02/2021    NRBC 1.0 10/05/2020    SEGSPCT 62 02/09/2013    LYMPHOPCT 8.7 03/26/2021    MONOPCT 4.1 03/26/2021    MYELOPCT 1.0 10/05/2020    BASOPCT 0.4 03/26/2021    MONOSABS 0.40 03/26/2021    LYMPHSABS 0.86 03/26/2021    EOSABS 0.00 03/26/2021    BASOSABS 0.04 03/26/2021     BMP:    Lab Results   Component Value Date     04/02/2021    K 4.0 04/02/2021    K 5.2 03/27/2021     04/02/2021    CO2 20 04/02/2021    BUN 15 04/02/2021    LABALBU 2.5 04/02/2021    LABALBU 3.5 06/09/2011    CREATININE 1.3 04/02/2021    CALCIUM 7.8 04/02/2021    GFRAA 48 04/02/2021    LABGLOM 40 04/02/2021    GLUCOSE 101 04/02/2021    GLUCOSE 167 06/10/2011       Assessment:  1. Enteritis/colitis with small bowel obstruction status post laparoscopic converted to open small bowel resection  2. Acute postoperative anemia   3.  Chronic compensated diastolic congestive heart failure with hyperdynamic function and ejection fraction of 80%  4. Chronic kidney disease stage III  5. Hyperlipidemia   6. Hypothyroidism   7. Mild to moderate protein calorie malnutrition  8. Gastroesophageal reflux disease with hiatal hernia  9. Moderate protein calorie malnutrition    Plan:   The patient sister is present at the bedside. Patient remains fearful to return home secondary to the abdominal pain and her sister states that the patient is to come to her home but because she resides alone that she is also concerned that if something should occur that this will be problematic. We discussed having  make arrangement for nursing home placement for further rehab. She states that she does not want this and that she would like to remain in the hospital until she is not having pain. We discussed that she is going to have abdominal pain related to the surgical procedure and that if she is fearful to return home she can go to the skilled nursing facility. We will have staff check with general surgeon, Dr. Shara Perkins in regard to the above. If no other indication to keep patient in the hospital via discharge plan will be for skilled nursing facility placement for rehabilitation in the event patient decides to not go home. We will continue to monitor chronic comorbidities and all medications have been transitioned to oral.    Encourage the patient to increase activity. Encouraged patient to increase the use of the incentive spirometry. Vital signs were reviewed. Patient is oxygen saturation earlier in the day was 88% and most recent was 91%. Encourage patient to sit up in the chair and become more active. Continue protein supplementation. More than 50% of my  time was spent at the bedside counseling/coordinating care with the patient and/or family with face to face contact. This time was spent reviewing notes and laboratory data as well as instructing and counseling the patient.  Time I spent with the family or surrogate(s) is included only if the patient was incapable of providing the necessary information or participating in medical decisions. I also discussed the differential diagnosis and all of the proposed management plans with the patient and individuals accompanying the patient. Isabella requires this high level of physician care and nursing on the IMC/Telemetry unit due the complexity of decision management and chance of rapid decline or death. Continued cardiac monitoring and higher level of nursing are required. I am readily available for any further decision-making and intervention. The patient was seen, examined and then discussed with Dr. Glenn Vincent. Marci Delgadillo, NIC - CNP  4/2/2021  10:42 AM       I saw and evaluated the patient. I agree with the findings and the plan of care as documented in Marci Delgadillo NP-C's  note.     Shaista Garvey D.O., [facility]  4:22 PM  4/2/2021

## 2021-04-02 NOTE — PROGRESS NOTES
Comprehensive Nutrition Assessment    Type and Reason for Visit:  Initial, RD Nutrition Re-Screen/LOS    Nutrition Recommendations/Plan: Continue with diet and start ONS BID Ensure Enlive    Nutrition Assessment:  Pt admits w/ dx: Enteritis with small bowel obstruction status post laparoscopic converted to open small bowel resection. Pt tolerating diet ~25% w/ meals. Will start ONS BID and monitor need for further nutritional interventions    Malnutrition Assessment:  Malnutrition Status: At risk for malnutrition (Comment)    Context:  Acute Illness     Findings of the 6 clinical characteristics of malnutrition:  Energy Intake:  7 - 50% or less of estimated energy requirements for 5 or more days  Weight Loss:  No significant weight loss     Body Fat Loss:  No significant body fat loss     Muscle Mass Loss:  No significant muscle mass loss    Fluid Accumulation:  No significant fluid accumulation     Strength:  Not Performed    Estimated Daily Nutrient Needs:  Energy (kcal):  1200-1300kcal/d; Weight Used for Energy Requirements:  Current     Protein (g):  65-75gm pro/d(x1.3-1.5gm/kg);  Weight Used for Protein Requirements:  Current        Fluid (ml/day):  1200-1300ml/d; Method Used for Fluid Requirements:  1 ml/kcal      Nutrition Related Findings:  A/ox4, +BS, Abd round/soft, +BS, no edema, creatinine,+I/O +5.1L      Wounds:  Surgical Incision       Current Nutrition Therapies:    DIET DENTAL SOFT;    Anthropometric Measures:  · Height: 5' (152.4 cm)  · Current Body Weight: 105 lb (47.6 kg)(3/26)   · Usual body weight: 96#  (10/19/20)  · Ideal Body Weight: 100 lbs; % Ideal Body Weight 105 %   · BMI: 20.5  · Adjusted Body Weight:  ; No Adjustment    · BMI Categories: Underweight (BMI less than 22) age over 72       Nutrition Diagnosis:   · Inadequate oral intake related to altered GI function as evidenced by intake 0-25%, wounds, BMI, GI abnormality(surgicial wound)      Nutrition Interventions:   Food and/or Nutrient Delivery:  Continue Current Diet, Start Oral Nutrition Supplement  Nutrition Education/Counseling:  Education not indicated   Coordination of Nutrition Care:  Continue to monitor while inpatient    Goals:  Pt to consume >50-75% most meals/ONS       Nutrition Monitoring and Evaluation:   Behavioral-Environmental Outcomes:  None Identified   Food/Nutrient Intake Outcomes:  Food and Nutrient Intake, Supplement Intake  Physical Signs/Symptoms Outcomes:  Biochemical Data, Chewing or Swallowing, GI Status, Fluid Status or Edema, Nutrition Focused Physical Findings, Skin, Weight     Discharge Planning:     Too soon to determine     Electronically signed by Timothy Valladares RD, LD on 4/2/21 at 9:50 AM EDT    Contact: [phone]

## 2021-04-03 VITALS
HEIGHT: 60 IN | HEART RATE: 76 BPM | DIASTOLIC BLOOD PRESSURE: 74 MMHG | SYSTOLIC BLOOD PRESSURE: 118 MMHG | WEIGHT: 105 LBS | RESPIRATION RATE: 15 BRPM | TEMPERATURE: 98 F | OXYGEN SATURATION: 95 % | BODY MASS INDEX: 20.62 KG/M2

## 2021-04-03 LAB
ALBUMIN SERPL-MCNC: 2.6 G/DL (ref 3.5–5.2)
ALP BLD-CCNC: 60 U/L (ref 35–104)
ALT SERPL-CCNC: 13 U/L (ref 0–32)
ANION GAP SERPL CALCULATED.3IONS-SCNC: 8 MMOL/L (ref 7–16)
AST SERPL-CCNC: 21 U/L (ref 0–31)
BILIRUB SERPL-MCNC: <0.2 MG/DL (ref 0–1.2)
BUN BLDV-MCNC: 12 MG/DL (ref 8–23)
CALCIUM SERPL-MCNC: 8 MG/DL (ref 8.6–10.2)
CHLORIDE BLD-SCNC: 105 MMOL/L (ref 98–107)
CO2: 24 MMOL/L (ref 22–29)
CREAT SERPL-MCNC: 1.4 MG/DL (ref 0.5–1)
GFR AFRICAN AMERICAN: 44
GFR NON-AFRICAN AMERICAN: 36 ML/MIN/1.73
GLUCOSE BLD-MCNC: 102 MG/DL (ref 74–99)
HCT VFR BLD CALC: 26.7 % (ref 34–48)
HEMOGLOBIN: 8.9 G/DL (ref 11.5–15.5)
MCH RBC QN AUTO: 32.4 PG (ref 26–35)
MCHC RBC AUTO-ENTMCNC: 33.3 % (ref 32–34.5)
MCV RBC AUTO: 97.1 FL (ref 80–99.9)
PDW BLD-RTO: 13.8 FL (ref 11.5–15)
PLATELET # BLD: 393 E9/L (ref 130–450)
PMV BLD AUTO: 9.4 FL (ref 7–12)
POTASSIUM SERPL-SCNC: 4.1 MMOL/L (ref 3.5–5)
RBC # BLD: 2.75 E12/L (ref 3.5–5.5)
SODIUM BLD-SCNC: 137 MMOL/L (ref 132–146)
TOTAL PROTEIN: 4.7 G/DL (ref 6.4–8.3)
WBC # BLD: 10 E9/L (ref 4.5–11.5)

## 2021-04-03 PROCEDURE — 36415 COLL VENOUS BLD VENIPUNCTURE: CPT

## 2021-04-03 PROCEDURE — 97116 GAIT TRAINING THERAPY: CPT

## 2021-04-03 PROCEDURE — 80053 COMPREHEN METABOLIC PANEL: CPT

## 2021-04-03 PROCEDURE — 6360000002 HC RX W HCPCS: Performed by: SURGERY

## 2021-04-03 PROCEDURE — 6370000000 HC RX 637 (ALT 250 FOR IP): Performed by: SURGERY

## 2021-04-03 PROCEDURE — 6370000000 HC RX 637 (ALT 250 FOR IP): Performed by: STUDENT IN AN ORGANIZED HEALTH CARE EDUCATION/TRAINING PROGRAM

## 2021-04-03 PROCEDURE — 2700000000 HC OXYGEN THERAPY PER DAY

## 2021-04-03 PROCEDURE — 6370000000 HC RX 637 (ALT 250 FOR IP): Performed by: INTERNAL MEDICINE

## 2021-04-03 PROCEDURE — 85027 COMPLETE CBC AUTOMATED: CPT

## 2021-04-03 PROCEDURE — 97110 THERAPEUTIC EXERCISES: CPT

## 2021-04-03 RX ADMIN — OXYCODONE 10 MG: 5 TABLET ORAL at 05:50

## 2021-04-03 RX ADMIN — PANTOPRAZOLE SODIUM 40 MG: 40 TABLET, DELAYED RELEASE ORAL at 05:24

## 2021-04-03 RX ADMIN — ENOXAPARIN SODIUM 30 MG: 30 INJECTION SUBCUTANEOUS at 08:41

## 2021-04-03 RX ADMIN — CALCIUM CARBONATE (ANTACID) CHEW TAB 500 MG 1000 MG: 500 CHEW TAB at 08:41

## 2021-04-03 RX ADMIN — LEVOTHYROXINE SODIUM 75 MCG: 75 TABLET ORAL at 05:24

## 2021-04-03 RX ADMIN — METOPROLOL SUCCINATE 25 MG: 25 TABLET, EXTENDED RELEASE ORAL at 08:41

## 2021-04-03 RX ADMIN — SERTRALINE 50 MG: 50 TABLET, FILM COATED ORAL at 08:41

## 2021-04-03 RX ADMIN — ASPIRIN 81 MG: 81 TABLET, CHEWABLE ORAL at 08:41

## 2021-04-03 RX ADMIN — PYRIDOXINE HCL TAB 50 MG 50 MG: 50 TAB at 08:41

## 2021-04-03 RX ADMIN — ACETAMINOPHEN 650 MG: 325 TABLET, FILM COATED ORAL at 08:41

## 2021-04-03 RX ADMIN — OXYCODONE 10 MG: 5 TABLET ORAL at 10:13

## 2021-04-03 RX ADMIN — FERROUS SULFATE TAB 325 MG (65 MG ELEMENTAL FE) 325 MG: 325 (65 FE) TAB at 08:41

## 2021-04-03 RX ADMIN — ATORVASTATIN CALCIUM 20 MG: 20 TABLET, FILM COATED ORAL at 08:41

## 2021-04-03 ASSESSMENT — PAIN SCALES - GENERAL
PAINLEVEL_OUTOF10: 8
PAINLEVEL_OUTOF10: 7
PAINLEVEL_OUTOF10: 7
PAINLEVEL_OUTOF10: 8
PAINLEVEL_OUTOF10: 6

## 2021-04-03 ASSESSMENT — PAIN DESCRIPTION - ONSET: ONSET: ON-GOING

## 2021-04-03 NOTE — CARE COORDINATION
SOCIAL WORK / DISCHARGE PLANNING:  Sw spoke with pt and sister via phone. Pt in need of kate wheeled walker now at [employer]. She states she has never had a walker from insurance. Sw reviewed dme, no preference just who is covered and can deliver. Plan to dc to sisters home [place] [facility] [address] [place] [id number]  Sisters phone [phone].  Referral called to [facility] DME, will arrange delivery to sisters home per request.                 Electronically signed by CAROLINA Cummings on 4/3/2021 at 11:37 AM

## 2021-04-03 NOTE — CARE COORDINATION
SOCIAL WORK / DISCHARGE PLANNING:  Inder notified [facility] Andry MCKINLEY.  [facility] orders in chart, dc instructions faxed to [phone] per request.             Electronically signed by CAROLINA Saldaña on 4/3/2021 at 10:12 AM

## 2021-04-03 NOTE — PROGRESS NOTES
Physical Therapy Treatment Note    Room #:  9700/0738-82  Patient Name: Ryan Elizabeth  YOB: 1945  MRN: [id number]    Referring Provider:   Katya Otto DO      Date of Service: 4/3/2021    Evaluating Physical Therapist: Christoph Akers, PT #[id number]       Diagnosis:   Enteritis [K52.9]    abodminal pain,  nausea and dry heaving but no significant vomiting      Patient Active Problem List   Diagnosis    Arthritis, hip    Avascular necrosis of bone of hip ([address]    HLD (hyperlipidemia)    Migraine    PUD (peptic ulcer disease)    Hypothyroid    Transient cerebral ischemia    [place] (acute kidney injury) ([address]    Acute on chronic diastolic (congestive) heart failure ([address]    CHF (congestive heart failure), NYHA class I, acute on chronic, combined (HCC)    Precordial pain    SOB (shortness of breath)    Enteritis    Small bowel obstruction ([address]        Tentative placement recommendation: Subacute vs Home Health Physical Therapy if patient meets goals    Equipment recommendation: jr wheeled walker      Prior Level of Function: Patient ambulated independently  drives  Rehab Potential: good    for baseline    Past medical history:   Past Medical History:   Diagnosis Date    Acute on chronic diastolic (congestive) heart failure ([address] 9/29/2020    Arthritis     GERD (gastroesophageal reflux disease)     H/O cardiovascular stress test 10/03/2020    Lexiscan    History of blood transfusion     Hyperlipidemia     Migraines     Thyroid disease      Past Surgical History:   Procedure Laterality Date    COLECTOMY N/A 3/27/2021    LAPAROSCOPIC CONVERT TO OPEN SMALL BOWEL RESECTION performed by Ara Black MD at [address] [place] [address] COLONOSCOPY      ENDOSCOPY, COLON, DIAGNOSTIC      FRACTURE SURGERY      left wrist, 2010-fx left hip    HYSTERECTOMY      JOINT REPLACEMENT      bilat knees    OTHER SURGICAL HISTORY Left 2-7-13    removal of hardware left hip left hip arthroplasty       Precautions: Up as tolerated, falls , s/p LAPAROSCOPIC CONVERT TO OPEN SMALL BOWEL RESECTION; log roll    SUBJECTIVE:    Social history: Patient lives with son   in a two story home bedroom 2nd floor  with 5 steps  to enter with [place] in shower first floor, tub shower 2nd floor      Equipment owned: Wheelchair, Cane, Gradie [place] and [address] chair,       [address] cleared patient for PT treatment. Family present. She and pt state a walker is needed for home use. Notified nursing who will contact social serviced for [address];   Initial Evaluation  Date: 3/30/2021 Treatment Date:  4/3/2021     Short Term/ Long Term   Goals   Was pt agreeable to Eval/treatment? Yes   yes To be met in 3 days   Pain level   7/10  abdomen C/o abdominal pain    Bed Mobility    Rolling: Minimal assist of 1    Supine to sit: Minimal assist of 1    Sit to supine: Not assessed     Scooting: Minimal assist of 1   Rolling: Minimal assist of 1  Supine to sit: Minimal assist of 1 with cues for log rolling to protect abdomen  Sit to supine: Supervision  cues for log roll  Scooting: Minimal assist of 1    Rolling: Independent    Supine to sit: Independent    Sit to supine: Independent    Scooting: Independent     Transfers Sit to stand: Minimal assist of 1 Cues for hand placement and safety  Sit to stand: Minimal assist of 1   from bed, supervision from  commode and chair ;       Sit to stand: Independent     Ambulation    45 feet using  wheeled walker with Minimal assist of 1     2 x 35 feet using  wheeled walker with Minimal assist of 1  decreased cues for safe walker approximation and turns    100 feet using  wheeled walker with Modified Independent    Stair negotiation: ascended and descended   Not assessed       10 steps 1 rail with supervision    ROM Within functional limits        Strength BUE:  refer to OT eval  RLE:  4-/5  LLE:  4-/5   Increase strength in affected mm groups by 1/3 grade   Balance Sitting EOB:  good    Dynamic Standing:  fair   Sitting EOB: good   Dynamic Standing: good with walker     Sitting EOB:  good    Dynamic Standing: good       Patient is Alert & Oriented x person, place, time and situation and follows directions    Sensation:  Patient  denies numbness and tingling     Edema:  none noted    Endurance: fair       Patient education  Patient educated on role of Physical Therapy, risks of immobility, safety and plan of care,  importance of mobility while in hospital  and positioning for skin integrity and comfort      Patient response to education:   Pt verbalized understanding Pt demonstrated skill Pt requires further education in this area   Yes Partial Yes      Treatment:  Patient practiced and was instructed/facilitated in the following treatment: Patient assisted to edge of bed,   Sat edge of bed 10 minutes with Supervision  to increase dynamic sitting balance and activity tolerance. pt amb to restroom,  amb in room, assisted pt with donning pajama pants seated in chair, pt returned to supine with alarm activated and family present. Pt fatigued with rests provided    Therapeutic Exercises:  ankle pumps and long arc quad x10        At end of session, patient in bed with alarm call light and phone within reach,   all lines and tubes intact, nursing notified. Patient would benefit from continued skilled Physical Therapy to improve functional independence and quality of life. Patient's/ family goals   home        ASSESSMENT: Patient exhibits decreased strength, balance, endurance and pain abdomen impairing functional mobility, transfers, gait , gait distance and tolerance to activity  are barriers to d/c and require skilled intervention during hospital stay   recommend home p.t.       Plan of Care:     -Bed Mobility: Lower extremity exercises   -Sitting Balance: Incorporate reaching activities to activate trunk muscles   -Standing Balance: Perform strengthening exercises in standing to promote motor control with or without upper extremity support   -Transfers: Cues for hand placement, technique and safety  -Gait: Gait training and Standing activities to improve: base of support, weight shift, weight bearing    -Endurance: Utilize Supervised activities to increase level of endurance to allow for safe functional mobility including transfers and gait   -Stairs: Stair training with instruction on proper technique and hand placement on rail    Patient and or family understand(s) diagnosis, prognosis, and plan of care. Frequency of treatments: Patient will be seen  daily.        Time in  1020  Time out  1044    Total Treatment Time  24minutes  CPT codes:  Therapeutic exercises (36691)   12 minutes  1 unit(s)  Gait Training (03916) 12 minutes 1 unit(s)    Karishma Pettit, [facility]

## 2021-04-04 NOTE — DISCHARGE SUMMARY
[address]                               DISCHARGE SUMMARY    PATIENT NAME: Sarina Irby                    :        1945  MED REC NO:   [id number]                            ROOM:       8185  ACCOUNT NO:   [de-identified]                           ADMIT DATE: 2021  PROVIDER:     Rk Malloy DO                  DISCHARGE DATE:  2021    ADMITTING DIAGNOSES:  Enteritis/colitis with small bowel obstruction,  status post laparoscopic converted to open small bowel resection. SECONDARY DISCHARGE DIAGNOSES:  Chronic compensated diastolic congestive  heart failure, chronic kidney disease stage 3,  hyperlipidemia, primary hypothyroidism, gastroesophageal reflux disease,  hiatal hernia, moderate protein-caloric malnutrition. COMPLICATIONS:  Acute postoperative anemia. OPERATIONS PERFORMED:  Laparoscopic converted to open small bowel  resection by Dr. Marco Poe. CONSULTATION OBTAINED:  Dr. Yvonne Frazier and Dr. Toni Marin. No OMT was given. ADMITTING PHYSICIAN:  Dr. Marco Poe. CHIEF COMPLAINT AND HISTORY OF CHIEF COMPLAINT:  This is a 70-year-old  white female who was admitted to [address] [place] [address]. The patient was  admitted to the hospital here under the service of Dr. Marco Poe. The  patient presented to the hospital with abdominal pain. The patient at  that time presented here with a small bowel obstruction. The patient at  this time, was taken to surgery. Postop consultation was obtained with  Dr. Yvonne Frazier and Dr. Toni Marin. PAST MEDICAL HISTORY:  Positive for usual childhood diseases, positive  for history of arthritis, history of avascular necrosis of the hip,  hyperlipidemia, peptic ulcer disease, hypothyroidism, acute kidney  injury, chronic congestive heart failure, shortness of breath,  enteritis. PHYSICAL EXAMINATION:  VITAL SIGNS:  Showed blood pressure to be 158/63, pulse 58, respirations  16. Afebrile. GENERAL APPEARANCE:  This is a 68-year-old white female. HEENT:  Normal.  LUNGS:  Clear. HEART:  Fairly regular. ABDOMEN:  With postop changes from her edema. While the patient was in the hospital, she had the following laboratory  studies performed:  BUN and creatinine at the time of discharge were 12  and 1.4 respectively. Electrolytes were satisfactory. CBC showed a  stable hemoglobin and hematocrit of 8.9 and 26.6 respectively. HOSPITAL COURSE OF STAY:  The patient was admitted directly to the  hospital under the service of Dr. Justine Martinez. The patient presented with  abdominal pain. The patient was taken to surgery on 03/27/2021, at  which time, a laparoscopic converted to small bowel resection was  performed. Postoperatively, the patient tolerated the procedure well. At this time, we were asked to participate in her care. The patient did  require a PICC line. The patient at this time was seen and evaluated. The patient did develop what appeared to be postop anemia, treated with  transfusion. The patient's diet was progressed as tolerated, and she  did relatively well. The patient did have slow recovery. Optimal care  was given; however, it did clinically improve her at which time she felt  stable enough to be able to be discharged to home on 04/03/2021. At the time of discharge on 04/03/2021, blood pressure 118/74, pulse 76,  respirations 15. She was afebrile. Hemoglobin and hematocrit at the  time of discharge was 8.9 and 26.7 respectively. White count 10,000. BUN and creatinine was 12 and 1.4 respectively. The patient will be  followed by her primary care doctor, Dr. Roman Flores. The patient was  discharged on the following medications:  Tums 500 b.i.d., iron 325  q.12, Toprol-XL 25 daily, Requip 1 mg at bedtime, Lipitor 20 mg daily,  Synthroid 75 mcg daily, Remeron 30 mg at bedtime, Zoloft 50 daily at  bedtime, and Protonix 40 daily.   The patient was prescribed Roxicodone  10 mg q.4 p.r.n. More than 40 minutes was spent on the day of discharge with more than  50% of the time spent face-to-face with the patient and her sister  present at the bedside. They will have postop care by Dr. Gila Corona. She will follow up with her primary care doctor, Dr. Miki Omalley. The  patient encouraged to return if any problems should arise. The patient  was given full instructions at the time of discharge.         Vimal Bautista DO    D: 04/03/2021 13:35:41       T: 04/04/2021 2:56:05     [email or web address]  Job#: [id number]     Doc#: [id number]    CC:

## 2021-04-07 ENCOUNTER — TELEPHONE (OUTPATIENT)
Dept: SURGERY | Age: 76
End: 2021-04-07

## 2021-04-07 NOTE — TELEPHONE ENCOUNTER
Call placed to patient to schedule post op follow up appointment. Patient not available, message left for patient to please return my call to schedule.   Electronically signed by Luigi Fontanez on 4/7/21 at 9:57 AM EDT

## 2021-04-13 ENCOUNTER — OFFICE VISIT (OUTPATIENT)
Dept: SURGERY | Age: 76
End: 2021-04-13

## 2021-04-13 VITALS
TEMPERATURE: 97.9 F | HEART RATE: 86 BPM | DIASTOLIC BLOOD PRESSURE: 70 MMHG | BODY MASS INDEX: 19.44 KG/M2 | SYSTOLIC BLOOD PRESSURE: 109 MMHG | HEIGHT: 60 IN | WEIGHT: 99 LBS

## 2021-04-13 DIAGNOSIS — Z90.49 STATUS POST SMALL BOWEL RESECTION: Primary | ICD-10-CM

## 2021-04-13 PROCEDURE — 99024 POSTOP FOLLOW-UP VISIT: CPT | Performed by: SURGERY

## 2021-04-13 NOTE — PROGRESS NOTES
General Surgery Office Note  [facility]  Consandre P. Felipa Butt MD    Patient's Name/Date of Birth: Allen Avendaño / 1945    Date: April 13, 2021     Surgeon: Felipa Butt MD    Chief Complaint:   Chief Complaint   Patient presents with    Post-Op Check       Patient Active Problem List   Diagnosis    Arthritis, hip    Avascular necrosis of bone of hip ([address]    HLD (hyperlipidemia)    Migraine    PUD (peptic ulcer disease)    Hypothyroid    Transient cerebral ischemia    [place] (acute kidney injury) ([address]    Acute on chronic diastolic (congestive) heart failure ([address]    CHF (congestive heart failure), NYHA class I, acute on chronic, combined ([address]    Precordial pain    SOB (shortness of breath)    Enteritis    Small bowel obstruction ([address]     Subjective: Doing well since leaving the hospital.  Has some intermittent constipation and wonders about taking stool softeners. No pain. Incision healing well. Objective:  /70   Pulse 86   Temp 97.9 °F (36.6 °C) (Temporal)   Ht 5' (1.524 m)   Wt 99 lb (44.9 kg)   BMI 19.33 kg/m²   Labs:  No results for input(s): WBC, HGB, HCT in the last 72 hours. Invalid input(s): PLR  Lab Results   Component Value Date    CREATININE 1.4 (H) 04/03/2021    BUN 12 04/03/2021     04/03/2021    K 4.1 04/03/2021     04/03/2021    CO2 24 04/03/2021     No results for input(s): LIPASE, AMYLASE in the last 72 hours. General appearance: AA, NAD  HEENT: NCAT, PERRLA, EOMI  Lungs: Clear, equal rise bilateral  Heart: Reg  Abdomen: soft, nondistended, nontender, incisions well healed, no signs of infection, no cellulitis, no hematoma  Skin: No lesions, incisions well healed  Psych: No distress, conversive, no hallucinations  : No ulcers or lesions  Rectal: No bleeding    A complete 10 system review was performed and are otherwise negative unless mentioned in the above HPI.  Specific negatives are listed below but may not include all those reviewed. General ROS: negative obtundation, AMS  ENT ROS: negative rhinorrhea, epistaxis  Allergy and Immunology ROS: negative itchy/watery eyes or nasal congestion  Hematological and Lymphatic ROS: negative spontaneous bleeding or bruising  Endocrine ROS: negative  lethargy, mood swings, palpitations or polydipsia/polyuria  Respiratory ROS: negative sputum changes, stridor, tachypnea or wheezing  Cardiovascular ROS: negative for - loss of consciousness, murmur or orthopnea  Gastrointestinal ROS: negative for - hematochezia or hematemesis  Genito-Urinary ROS: negative for -  genital discharge or hematuria  Musculoskeletal ROS: negative for - focal weakness, gangrene  Psych/Neuro ROS: negative for - visual or auditory hallucinations, suicidal ideation      Time spent reviewing past medical, surgical, social and family history, vitals, nursing assessment and images.     Imaging:  n/a    Pathology:   Diagnosis:   Small bowel, excision: Ischemic enteritis   Margins appear viable   Mesenteric fibroadipose tissue with hemorrhage   Smaller, separate segment of bowel appears viable     Assessment/Plan:  Tristen Jaramillo is a 67 y.o. female 2 weeks s/p ex lap, small bowel resection for closed-loop obstruction, doing well    Diet as tolerated  Stool softeners for intermittent constipation  Follow-up as needed    Physician Signature: Electronically signed by Dr. James Coleman  4/13/2021  11:47 AM

## 2021-06-13 NOTE — PLAN OF CARE
Problem: Pain:  Goal: Pain level will decrease  Description: Pain level will decrease  3/31/2021 2316 by Julio Cesar Brothers RN  Outcome: Met This Shift     Problem: Pain:  Goal: Control of acute pain  Description: Control of acute pain  3/31/2021 2316 by Julio Cesar Brothers RN  Outcome: Met This Shift     Problem: Pain:  Goal: Control of chronic pain  Description: Control of chronic pain  Outcome: Met This Shift     Problem: Falls - Risk of:  Goal: Will remain free from falls  Description: Will remain free from falls  3/31/2021 2316 by Julio Cesar Brothers RN  Outcome: Met This Shift     Problem: Falls - Risk of:  Goal: Absence of physical injury  Description: Absence of physical injury  Outcome: Met This Shift Report To ems and Yolanda at [facility]. Spoke to wife Pam Mcnair  06/13/21 0154

## 2021-11-25 ENCOUNTER — HOSPITAL ENCOUNTER (INPATIENT)
Age: 76
LOS: 2 days | Discharge: HOME | DRG: 392 | End: 2021-11-28
Attending: EMERGENCY MEDICINE | Admitting: INTERNAL MEDICINE
Payer: MEDICARE

## 2021-11-25 DIAGNOSIS — K56.600 PARTIAL SMALL BOWEL OBSTRUCTION (HCC): Primary | ICD-10-CM

## 2021-11-25 PROCEDURE — 96374 THER/PROPH/DIAG INJ IV PUSH: CPT

## 2021-11-25 PROCEDURE — 96375 TX/PRO/DX INJ NEW DRUG ADDON: CPT

## 2021-11-25 PROCEDURE — 99284 EMERGENCY DEPT VISIT MOD MDM: CPT

## 2021-11-26 ENCOUNTER — APPOINTMENT (OUTPATIENT)
Dept: CT IMAGING | Age: 76
DRG: 392 | End: 2021-11-26
Payer: MEDICARE

## 2021-11-26 PROBLEM — R10.84 GENERALIZED ABDOMINAL PAIN: Status: ACTIVE | Noted: 2021-11-26

## 2021-11-26 PROBLEM — K56.609 SMALL BOWEL OBSTRUCTION (HCC): Status: RESOLVED | Noted: 2021-03-27 | Resolved: 2021-11-26

## 2021-11-26 PROBLEM — K56.609 SBO (SMALL BOWEL OBSTRUCTION) (HCC): Status: ACTIVE | Noted: 2021-11-26

## 2021-11-26 PROBLEM — K56.609 SBO (SMALL BOWEL OBSTRUCTION) (HCC): Status: RESOLVED | Noted: 2021-11-26 | Resolved: 2021-11-26

## 2021-11-26 LAB
ALBUMIN SERPL-MCNC: 4.1 G/DL (ref 3.5–5.2)
ALP BLD-CCNC: 128 U/L (ref 35–104)
ALT SERPL-CCNC: 9 U/L (ref 0–32)
ANION GAP SERPL CALCULATED.3IONS-SCNC: 14 MMOL/L (ref 7–16)
AST SERPL-CCNC: 20 U/L (ref 0–31)
BACTERIA: ABNORMAL /HPF
BASOPHILS ABSOLUTE: 0.04 E9/L (ref 0–0.2)
BASOPHILS RELATIVE PERCENT: 0.5 % (ref 0–2)
BILIRUB SERPL-MCNC: <0.2 MG/DL (ref 0–1.2)
BILIRUBIN URINE: NEGATIVE
BLOOD, URINE: ABNORMAL
BUN BLDV-MCNC: 22 MG/DL (ref 6–23)
CALCIUM SERPL-MCNC: 9.5 MG/DL (ref 8.6–10.2)
CHLORIDE BLD-SCNC: 102 MMOL/L (ref 98–107)
CLARITY: ABNORMAL
CO2: 22 MMOL/L (ref 22–29)
COLOR: YELLOW
CREAT SERPL-MCNC: 1.7 MG/DL (ref 0.5–1)
EOSINOPHILS ABSOLUTE: 0 E9/L (ref 0.05–0.5)
EOSINOPHILS RELATIVE PERCENT: 0 % (ref 0–6)
EPITHELIAL CELLS, UA: ABNORMAL /HPF
GFR AFRICAN AMERICAN: 35
GFR NON-AFRICAN AMERICAN: 29 ML/MIN/1.73
GLUCOSE BLD-MCNC: 183 MG/DL (ref 74–99)
GLUCOSE URINE: NEGATIVE MG/DL
HCT VFR BLD CALC: 33.2 % (ref 34–48)
HEMOGLOBIN: 10.1 G/DL (ref 11.5–15.5)
IMMATURE GRANULOCYTES #: 0.03 E9/L
IMMATURE GRANULOCYTES %: 0.4 % (ref 0–5)
KETONES, URINE: NEGATIVE MG/DL
LACTIC ACID: 1.1 MMOL/L (ref 0.5–2.2)
LEUKOCYTE ESTERASE, URINE: ABNORMAL
LIPASE: 37 U/L (ref 13–60)
LYMPHOCYTES ABSOLUTE: 0.68 E9/L (ref 1.5–4)
LYMPHOCYTES RELATIVE PERCENT: 8.1 % (ref 20–42)
MCH RBC QN AUTO: 26.2 PG (ref 26–35)
MCHC RBC AUTO-ENTMCNC: 30.4 % (ref 32–34.5)
MCV RBC AUTO: 86 FL (ref 80–99.9)
MONOCYTES ABSOLUTE: 0.27 E9/L (ref 0.1–0.95)
MONOCYTES RELATIVE PERCENT: 3.2 % (ref 2–12)
NEUTROPHILS ABSOLUTE: 7.37 E9/L (ref 1.8–7.3)
NEUTROPHILS RELATIVE PERCENT: 87.8 % (ref 43–80)
NITRITE, URINE: NEGATIVE
PDW BLD-RTO: 16.4 FL (ref 11.5–15)
PH UA: 5.5 (ref 5–9)
PLATELET # BLD: 341 E9/L (ref 130–450)
PMV BLD AUTO: 10.4 FL (ref 7–12)
POTASSIUM REFLEX MAGNESIUM: 4.6 MMOL/L (ref 3.5–5)
PROTEIN UA: ABNORMAL MG/DL
RBC # BLD: 3.86 E12/L (ref 3.5–5.5)
RBC UA: ABNORMAL /HPF (ref 0–2)
SODIUM BLD-SCNC: 138 MMOL/L (ref 132–146)
SPECIFIC GRAVITY UA: >=1.03 (ref 1–1.03)
TOTAL PROTEIN: 7.1 G/DL (ref 6.4–8.3)
TROPONIN, HIGH SENSITIVITY: 12 NG/L (ref 0–9)
TROPONIN, HIGH SENSITIVITY: 13 NG/L (ref 0–9)
UROBILINOGEN, URINE: 0.2 E.U./DL
WBC # BLD: 8.4 E9/L (ref 4.5–11.5)
WBC UA: ABNORMAL /HPF (ref 0–5)

## 2021-11-26 PROCEDURE — 2500000003 HC RX 250 WO HCPCS: Performed by: INTERNAL MEDICINE

## 2021-11-26 PROCEDURE — 96376 TX/PRO/DX INJ SAME DRUG ADON: CPT

## 2021-11-26 PROCEDURE — 85025 COMPLETE CBC W/AUTO DIFF WBC: CPT

## 2021-11-26 PROCEDURE — 97165 OT EVAL LOW COMPLEX 30 MIN: CPT

## 2021-11-26 PROCEDURE — 99222 1ST HOSP IP/OBS MODERATE 55: CPT | Performed by: SURGERY

## 2021-11-26 PROCEDURE — 81001 URINALYSIS AUTO W/SCOPE: CPT

## 2021-11-26 PROCEDURE — G0378 HOSPITAL OBSERVATION PER HR: HCPCS

## 2021-11-26 PROCEDURE — 36415 COLL VENOUS BLD VENIPUNCTURE: CPT

## 2021-11-26 PROCEDURE — C9113 INJ PANTOPRAZOLE SODIUM, VIA: HCPCS | Performed by: INTERNAL MEDICINE

## 2021-11-26 PROCEDURE — 93005 ELECTROCARDIOGRAM TRACING: CPT | Performed by: NURSE PRACTITIONER

## 2021-11-26 PROCEDURE — 74176 CT ABD & PELVIS W/O CONTRAST: CPT

## 2021-11-26 PROCEDURE — 83690 ASSAY OF LIPASE: CPT

## 2021-11-26 PROCEDURE — 83605 ASSAY OF LACTIC ACID: CPT

## 2021-11-26 PROCEDURE — 96375 TX/PRO/DX INJ NEW DRUG ADDON: CPT

## 2021-11-26 PROCEDURE — 6370000000 HC RX 637 (ALT 250 FOR IP): Performed by: SURGERY

## 2021-11-26 PROCEDURE — 1200000000 HC SEMI PRIVATE

## 2021-11-26 PROCEDURE — 6360000002 HC RX W HCPCS: Performed by: INTERNAL MEDICINE

## 2021-11-26 PROCEDURE — 6360000002 HC RX W HCPCS

## 2021-11-26 PROCEDURE — 96374 THER/PROPH/DIAG INJ IV PUSH: CPT

## 2021-11-26 PROCEDURE — 84484 ASSAY OF TROPONIN QUANT: CPT

## 2021-11-26 PROCEDURE — 6360000002 HC RX W HCPCS: Performed by: EMERGENCY MEDICINE

## 2021-11-26 PROCEDURE — 97161 PT EVAL LOW COMPLEX 20 MIN: CPT | Performed by: PHYSICAL THERAPIST

## 2021-11-26 PROCEDURE — 80053 COMPREHEN METABOLIC PANEL: CPT

## 2021-11-26 RX ORDER — HYDROMORPHONE HYDROCHLORIDE 1 MG/ML
1 INJECTION, SOLUTION INTRAMUSCULAR; INTRAVENOUS; SUBCUTANEOUS ONCE
Status: COMPLETED | OUTPATIENT
Start: 2021-11-26 | End: 2021-11-26

## 2021-11-26 RX ORDER — MORPHINE SULFATE 2 MG/ML
2 INJECTION, SOLUTION INTRAMUSCULAR; INTRAVENOUS EVERY 4 HOURS PRN
Status: DISCONTINUED | OUTPATIENT
Start: 2021-11-26 | End: 2021-11-28 | Stop reason: HOSPADM

## 2021-11-26 RX ORDER — FENTANYL CITRATE 50 UG/ML
25 INJECTION, SOLUTION INTRAMUSCULAR; INTRAVENOUS ONCE
Status: COMPLETED | OUTPATIENT
Start: 2021-11-26 | End: 2021-11-26

## 2021-11-26 RX ORDER — BISACODYL 10 MG
10 SUPPOSITORY, RECTAL RECTAL ONCE
Status: COMPLETED | OUTPATIENT
Start: 2021-11-26 | End: 2021-11-26

## 2021-11-26 RX ORDER — ONDANSETRON 2 MG/ML
4 INJECTION INTRAMUSCULAR; INTRAVENOUS EVERY 6 HOURS PRN
Status: DISCONTINUED | OUTPATIENT
Start: 2021-11-26 | End: 2021-11-26 | Stop reason: SDUPTHER

## 2021-11-26 RX ORDER — PANTOPRAZOLE SODIUM 40 MG/10ML
40 INJECTION, POWDER, LYOPHILIZED, FOR SOLUTION INTRAVENOUS 2 TIMES DAILY
Status: DISCONTINUED | OUTPATIENT
Start: 2021-11-26 | End: 2021-11-27

## 2021-11-26 RX ORDER — SODIUM CHLORIDE AND POTASSIUM CHLORIDE .9; .15 G/100ML; G/100ML
SOLUTION INTRAVENOUS CONTINUOUS
Status: DISCONTINUED | OUTPATIENT
Start: 2021-11-26 | End: 2021-11-28 | Stop reason: HOSPADM

## 2021-11-26 RX ORDER — METOPROLOL TARTRATE 5 MG/5ML
5 INJECTION INTRAVENOUS EVERY 6 HOURS
Status: DISCONTINUED | OUTPATIENT
Start: 2021-11-26 | End: 2021-11-27

## 2021-11-26 RX ORDER — ONDANSETRON 2 MG/ML
4 INJECTION INTRAMUSCULAR; INTRAVENOUS EVERY 6 HOURS PRN
Status: DISCONTINUED | OUTPATIENT
Start: 2021-11-26 | End: 2021-11-28 | Stop reason: HOSPADM

## 2021-11-26 RX ORDER — ALBUTEROL SULFATE 2.5 MG/3ML
2.5 SOLUTION RESPIRATORY (INHALATION) EVERY 6 HOURS PRN
Status: DISCONTINUED | OUTPATIENT
Start: 2021-11-26 | End: 2021-11-28 | Stop reason: HOSPADM

## 2021-11-26 RX ADMIN — FENTANYL CITRATE 25 MCG: 50 INJECTION INTRAMUSCULAR; INTRAVENOUS at 01:24

## 2021-11-26 RX ADMIN — MORPHINE SULFATE 2 MG: 2 INJECTION, SOLUTION INTRAMUSCULAR; INTRAVENOUS at 14:26

## 2021-11-26 RX ADMIN — MORPHINE SULFATE 2 MG: 2 INJECTION, SOLUTION INTRAMUSCULAR; INTRAVENOUS at 21:15

## 2021-11-26 RX ADMIN — HYDROMORPHONE HYDROCHLORIDE 1 MG: 1 INJECTION, SOLUTION INTRAMUSCULAR; INTRAVENOUS; SUBCUTANEOUS at 05:37

## 2021-11-26 RX ADMIN — POTASSIUM CHLORIDE AND SODIUM CHLORIDE: 900; 150 INJECTION, SOLUTION INTRAVENOUS at 09:41

## 2021-11-26 RX ADMIN — MORPHINE SULFATE 2 MG: 2 INJECTION, SOLUTION INTRAMUSCULAR; INTRAVENOUS at 09:41

## 2021-11-26 RX ADMIN — PANTOPRAZOLE SODIUM 40 MG: 40 INJECTION, POWDER, FOR SOLUTION INTRAVENOUS at 09:41

## 2021-11-26 RX ADMIN — METOPROLOL TARTRATE 5 MG: 1 INJECTION, SOLUTION INTRAVENOUS at 09:42

## 2021-11-26 RX ADMIN — ONDANSETRON 4 MG: 2 INJECTION INTRAMUSCULAR; INTRAVENOUS at 01:23

## 2021-11-26 RX ADMIN — METOPROLOL TARTRATE 5 MG: 1 INJECTION, SOLUTION INTRAVENOUS at 16:59

## 2021-11-26 RX ADMIN — PANTOPRAZOLE SODIUM 40 MG: 40 INJECTION, POWDER, FOR SOLUTION INTRAVENOUS at 20:19

## 2021-11-26 RX ADMIN — BISACODYL 10 MG: 10 SUPPOSITORY RECTAL at 12:55

## 2021-11-26 RX ADMIN — ONDANSETRON 4 MG: 2 INJECTION INTRAMUSCULAR; INTRAVENOUS at 09:41

## 2021-11-26 RX ADMIN — METOPROLOL TARTRATE 5 MG: 1 INJECTION, SOLUTION INTRAVENOUS at 21:15

## 2021-11-26 RX ADMIN — POTASSIUM CHLORIDE AND SODIUM CHLORIDE: 900; 150 INJECTION, SOLUTION INTRAVENOUS at 23:39

## 2021-11-26 ASSESSMENT — PAIN SCALES - GENERAL
PAINLEVEL_OUTOF10: 3
PAINLEVEL_OUTOF10: 2
PAINLEVEL_OUTOF10: 7
PAINLEVEL_OUTOF10: 7
PAINLEVEL_OUTOF10: 10
PAINLEVEL_OUTOF10: 10
PAINLEVEL_OUTOF10: 3
PAINLEVEL_OUTOF10: 7

## 2021-11-26 ASSESSMENT — PAIN DESCRIPTION - DESCRIPTORS
DESCRIPTORS: ACHING;DISCOMFORT
DESCRIPTORS: ACHING

## 2021-11-26 ASSESSMENT — ENCOUNTER SYMPTOMS
ABDOMINAL PAIN: 1
NAUSEA: 1
EYE DISCHARGE: 0
EYE REDNESS: 0
SORE THROAT: 0
DIARRHEA: 0
BLOOD IN STOOL: 0
RHINORRHEA: 0
VOMITING: 0
SHORTNESS OF BREATH: 0
COUGH: 0
BACK PAIN: 0

## 2021-11-26 ASSESSMENT — PAIN DESCRIPTION - ORIENTATION: ORIENTATION: MID

## 2021-11-26 ASSESSMENT — PAIN DESCRIPTION - PROGRESSION: CLINICAL_PROGRESSION: NOT CHANGED

## 2021-11-26 ASSESSMENT — PAIN DESCRIPTION - ONSET: ONSET: GRADUAL

## 2021-11-26 ASSESSMENT — PAIN DESCRIPTION - FREQUENCY
FREQUENCY: CONTINUOUS
FREQUENCY: INTERMITTENT

## 2021-11-26 ASSESSMENT — PAIN - FUNCTIONAL ASSESSMENT: PAIN_FUNCTIONAL_ASSESSMENT: ACTIVITIES ARE NOT PREVENTED

## 2021-11-26 ASSESSMENT — PAIN DESCRIPTION - PAIN TYPE: TYPE: ACUTE PAIN

## 2021-11-26 ASSESSMENT — PAIN DESCRIPTION - LOCATION
LOCATION: ABDOMEN
LOCATION: ABDOMEN

## 2021-11-26 NOTE — ED TRIAGE NOTES
FIRST PROVIDER CONTACT ASSESSMENT NOTE           Department of Emergency Medicine                 First Provider Note            21  11:24 PM EST    Date of Encounter: No admission date for patient encounter. Patient Name: Andreia Walsh  : 1945  MRN: [id number]    Chief Complaint: No chief complaint on file. History of Present Illness:   Andreia Walsh is a 67 y.o. female who presents to the ED for abdominal pain and emesis which started at 5pm today. Focused Physical Exam:  VS:    ED Triage Vitals [21 2243]   BP Temp Temp Source Pulse Resp SpO2 Height Weight   -- 98.7 °F (37.1 °C) Infrared 69 -- 96 % -- --        Physical Ex: Constitutional: Alert and non-toxic. Medical History:  has a past medical history of Acute on chronic diastolic (congestive) heart failure ([address] Arthritis, GERD (gastroesophageal reflux disease), H/O cardiovascular stress test, History of blood transfusion, Hyperlipidemia, Migraines, and Thyroid disease. Surgical History:  has a past surgical history that includes Hysterectomy; Endoscopy, colon, diagnostic; Colonoscopy; fracture surgery; other surgical history (Left, 2--); joint replacement; and colectomy (N/A, 3/27/2021). Social History:  reports that she has never smoked. She has never used smokeless tobacco. She reports that she does not drink alcohol and does not use drugs. Family History: family history includes Cancer in her sister.     Allergies: Penicillins     Initial Plan of Care: Initiate Treatment-Testing, Proceed toTreatment Area When Bed Available for ED Attending/MLP to Continue Care      ---END OF FIRST PROVIDER CONTACT ASSESSMENT NOTE---  Electronically signed by NIC Baez CNP   DD: 21

## 2021-11-26 NOTE — CONSULTS
Elin Mahajan  11/26/2021      Surgical Consult    CHIEF COMPLAINT:  Abdominal pain    HISTORY OF PRESENT ILLNESS:  Elin Mahajan is a 67 y.o. female with sudden onset of abdominal pain and vomiting last night after dinner. CT scan shows a large hiatal hernia with much of the stomach in the chest and distended loops of small bowel with a large amount of stool or undigested food in the distal small bowel consistent with ileus or obstruction, no oral contrast given. Repeat CT with oral contrast shows contrast in the transverse colon and the stool slowly passing distally. History:   3/27/2021 closed loop small bowel obstruction, open resection of a segment of ischemic bowel. Weight: 99 lb (44.9 kg). Past Medical History: She has a past medical history of Acute on chronic diastolic (congestive) heart failure ([address] Arthritis, GERD (gastroesophageal reflux disease), H/O cardiovascular stress test, History of blood transfusion, Hyperlipidemia, Migraines, and Thyroid disease. Past Surgical History: She has a past surgical history that includes Hysterectomy; Endoscopy, colon, diagnostic; Colonoscopy; fracture surgery; other surgical history (Left, 2-7-13); joint replacement; and colectomy (N/A, 3/27/2021). Allergies: Penicillins    Home Medicines:   Prior to Visit Medications    Medication Sig Taking?  Authorizing Provider   ondansetron (ZOFRAN-ODT) 4 MG disintegrating tablet Take 1 tablet by mouth every 8 hours as needed for Nausea or Vomiting  Feliciano Adames, DO   calcium carbonate (TUMS) 500 MG chewable tablet Take 2 tablets by mouth daily Once in a while a couple days ago  Historical Provider, MD   aspirin 81 MG chewable tablet Take 1 tablet by mouth daily  Darrion Black, DO   ferrous sulfate (IRON 325) 325 (65 Fe) MG tablet Take 1 tablet by mouth 2 times daily (with meals)  Darrion Black, DO   metoprolol succinate (TOPROL XL) 25 MG extended release tablet Take 1 tablet by mouth daily  Darrion Black, DO   rOPINIRole (REQUIP) 1 MG tablet Take 1 tablet by mouth nightly  Terrall Reasons, DO   atorvastatin (LIPITOR) 20 MG tablet Take 1 tablet by mouth daily  Terrall Reasons, DO   vitamin B-6 (B-6) 50 MG tablet Take 1 tablet by mouth daily  Terrall Reasons, DO   butalbital-aspirin-caffeine-codeine (FIORINAL/CODEINE #3) -40-30 MG capsule Take 1 capsule by mouth every 4-6 hours as needed for Pain. Historical Provider, MD   levothyroxine (SYNTHROID) 75 MCG tablet Take 75 mcg by mouth Daily  Historical Provider, MD   mirtazapine (REMERON) 30 MG tablet Take 30 mg by mouth nightly  Historical Provider, MD   pantoprazole (PROTONIX) 40 MG tablet Take 40 mg by mouth daily  Historical Provider, MD   sertraline (ZOLOFT) 50 MG tablet Take 50 mg by mouth daily  Historical Provider, MD       Social History:   TOBACCO:   reports that she has never smoked. She has never used smokeless tobacco.  ETOH:    reports no history of alcohol use. Problem Relation Age of Onset    Cancer Sister         unsure         REVIEW OF SYSTEMS:  Constitutional: negative  Respiratory: negative  Cardiovascular: negative  Gastrointestinal: negative  Musculoskeletal:negative  Behavioral/Psych: negative  All others reviewed, negative    Recent Labs     11/26/21  0028   WBC 8.4   HGB 10.1*            K 4.6   BUN 22   CREATININE 1.7*   GLUCOSE 183*   LABALBU 4.1   PROT 7.1   CALCIUM 9.5   BILITOT <0.2   ALKPHOS 128*   AST 20   ALT 9       Physical exam:   VITALS:  Blood pressure (!) 160/85, pulse 79, temperature 98.7 °F (37.1 °C), temperature source Infrared, resp. rate 20, weight 99 lb (44.9 kg), SpO2 97 %.   General appearance: alert, appears stated age and cooperative  Head: Normocephalic, without obvious abnormality, atraumatic  Eyes: PERRL, EOMI  Neck: no adenopathy, no carotid bruit, no JVD, supple, symmetrical, trachea midline and thyroid not enlarged, symmetric, no tenderness/mass/nodules  Lungs: clear to auscultation bilaterally  Heart: regular rate and rhythm,   Abdomen: soft, mild tender; bowel sounds normal, not hyperactive; no hernias palpable  Extremities: extremities normal, atraumatic, no cyanosis or edema    ASSESSMENT:   Abdominal pain with possible small bowel obstruction seen on CT scan. Large hiatal hernia with much of the stomach in the chest.    PLAN:  IV fluids, regular diet  Dulcolax suppository    Signed: Dr. Cayetano Cochran M.D.     Send copy of H&P to PCP, Joe Christina MD

## 2021-11-26 NOTE — CONSULTS
GENERAL SURGERY  CONSULT NOTE  11/26/2021    Physician Consulted: Dr. Margo Cooper  Reason for Consult: SBO  Referring Physician: Dr. Jacobson Peg is a 67 y.o. female who presents for evaluation of abdominal pain, constipation. She presented to the ER after a couple days worth of abdominal pina. She denies nausea or vomiting. She states she has had issues with constipation for a while and has issues having BMs. She states they are usually hard. She had a SBO earlier this year and underwent an ex lap SBR for this. She has been doing well since. She feels distended. She ahd a CT which shows a large Hiatal hernia which she denies any issues with eating and a pattern of small bowel dilation with fecalization of her small bowel at the previous anastomosis. Past Medical History:   Diagnosis Date    Acute on chronic diastolic (congestive) heart failure (HCC) 9/29/2020    Arthritis     GERD (gastroesophageal reflux disease)     H/O cardiovascular stress test 10/03/2020    Lexiscan    History of blood transfusion     Hyperlipidemia     Migraines     Thyroid disease        Past Surgical History:   Procedure Laterality Date    COLECTOMY N/A 03/27/2021    OPEN SMALL BOWEL RESECTION by [facility]    COLONOSCOPY      ENDOSCOPY, COLON, DIAGNOSTIC      FRACTURE SURGERY      left wrist, 2010-fx left hip    HYSTERECTOMY      JOINT REPLACEMENT      bilat knees    OTHER SURGICAL HISTORY Left 02/07/2013    removal of hardware left hip left hip arthroplasty       Medications Prior to Admission:    Prior to Admission medications    Medication Sig Start Date End Date Taking?  Authorizing Provider   ondansetron (ZOFRAN-ODT) 4 MG disintegrating tablet Take 1 tablet by mouth every 8 hours as needed for Nausea or Vomiting 4/2/21   Joan Koyanagi, DO   calcium carbonate (TUMS) 500 MG chewable tablet Take 2 tablets by mouth daily Once in a while a couple days ago    Historical Provider, MD aspirin 81 MG chewable tablet Take 1 tablet by mouth daily 10/6/20   Jennifer Rollins,    ferrous sulfate (IRON 325) 325 (65 Fe) MG tablet Take 1 tablet by mouth 2 times daily (with meals) 10/5/20   Jennifer Rollins,    metoprolol succinate (TOPROL XL) 25 MG extended release tablet Take 1 tablet by mouth daily 10/6/20   Jennifer Rollins, DO   rOPINIRole (REQUIP) 1 MG tablet Take 1 tablet by mouth nightly 10/5/20   Jennifer Rollins,    atorvastatin (LIPITOR) 20 MG tablet Take 1 tablet by mouth daily 10/6/20   Jennifer Rollins,    vitamin B-6 (B-6) 50 MG tablet Take 1 tablet by mouth daily 10/6/20   Jennifer Rollins,    butalbital-aspirin-caffeine-codeine (FIORINAL/CODEINE #3) -59-30 MG capsule Take 1 capsule by mouth every 4-6 hours as needed for Pain.     Historical Provider, MD   levothyroxine (SYNTHROID) 75 MCG tablet Take 75 mcg by mouth Daily    Historical Provider, MD   mirtazapine (REMERON) 30 MG tablet Take 30 mg by mouth nightly    Historical Provider, MD   pantoprazole (PROTONIX) 40 MG tablet Take 40 mg by mouth daily    Historical Provider, MD   sertraline (ZOLOFT) 50 MG tablet Take 50 mg by mouth daily    Historical Provider, MD       Allergies   Allergen Reactions    Penicillins Hives and Rash       Family History   Problem Relation Age of Onset    Cancer Sister         unsure        Social History     Tobacco Use    Smoking status: Never Smoker    Smokeless tobacco: Never Used   Substance Use Topics    Alcohol use: No    Drug use: No         Review of Systems   General ROS: negative obtundation, AMS  ENT ROS: negative rhinorrhea, epistaxis  Allergy and Immunology ROS: negative itchy/watery eyes or nasal congestion  Hematological and Lymphatic ROS: negative spontaneous bleeding or bruising  Endocrine ROS: negative  lethargy, mood swings, palpitations or polydipsia/polyuria  Respiratory ROS: negative sputum changes, stridor, tachypnea or wheezing  Cardiovascular ROS: negative for - loss of consciousness, murmur PROVIDED HISTORY: Reason for exam:->Abdominal pain, dry heaving Additional Contrast?->None Decision Support Exception - unselect if not a suspected or confirmed emergency medical condition->Emergency Medical Condition (MA) FINDINGS: Lower Chest: Lung bases are free of infiltrates and effusions. Organs: Large hiatal hernia increased in size in the interval since the prior examination with intrathoracic displacement of the gastric fundus. There is dilatation of the gastric fundus and body of the stomach. Gastric outlet obstruction or gastro paresis should be considered. Consider further evaluation with oral contrast. The liver, spleen, adrenals, pancreas and gallbladder are within normal limits. Punctate nonobstructive calcification in the left kidney measuring less than 2 mm. No obstructive uropathy. Trace perihepatic fluid, decreased in the interval since the prior examination. Bowel GI: Dilatation of multiple segments of small bowel in the lower abdomen and pelvis with air-fluid levels which may represent partial obstruction or ileus. Small bowel feces seen adjacent to anastomotic surgical changes in the right side of the pelvis which may represent a transition point. There is no free intraperitoneal air. There is apparent focal narrowing and rotatory configuration of small bowel in the right side of the pelvis. Small bowel volvulus cannot be excluded. Further evaluation with oral contrast would be helpful in further assessment of these findings. Normal appendix. Sigmoid diverticulosis with no evidence of diverticulitis. Pelvis: Prominent artifact in the pelvis secondary to left hip prosthesis. Urinary bladder is not distended. Status post hysterectomy. Peritoneum/Retroperitoneum: No abdominal aortic aneurysm or retroperitoneal adenopathy. Bones/Soft Tissues: No acute bony pathology.      Large hiatal hernia increased in size in the interval since the prior examination with intrathoracic displacement of the gastric fundus. There is dilatation of the gastric fundus and body of the stomach. Gastric outlet obstruction or gastro paresis should be considered. Consider further evaluation with oral contrast. Punctate nonobstructive calcification in the left kidney measuring less than 2 mm. No obstructive uropathy. Dilatation of multiple segments of small bowel in the lower abdomen and pelvis with air-fluid levels which may represent partial obstruction or ileus. Small bowel feces seen adjacent to anastomotic surgical changes in the right side of the pelvis which may represent a transition point. There is apparent focal narrowing and rotatory configuration of small bowel in the right side of the pelvis. Small bowel volvulus cannot be excluded. Further evaluation with oral contrast would be helpful in further assessment of these findings. Sigmoid diverticulosis with no evidence of diverticulitis.          ASSESSMENT:  67 y.o. female with SBO, constipation    PLAN:  NPO- ok for ice chips and meds  Will repeat CT w/ PO contrast later today vs SBFT  No need for NGT  Will discuss with Dr. Ivy Cano    Electronically signed by Don Marin DO on 11/26/21 at 7:31 AM EST

## 2021-11-26 NOTE — ED PROVIDER NOTES
65-year-old female presenting emergency department with abdominal pain, emesis beginning at 530. Patient has previous history of colectomy in 3- and previous enteritis and small bowel obstruction. .  Patient states that she had Thanksgiving lunch, but only had a few bites of turkey today. Patient states that pain onset around 530, sharp, 10 out of 10, nothing improves, worsens with palpation. Patient reports associated nausea, dry heaving. Patient was able to defecate earlier today. Patient denies any diarrhea, syncope, difficulty urinating, chest pain, shortness of breath, cough, numbness, weakness. Review of Systems   Constitutional: Negative for chills, fatigue and fever. HENT: Negative for congestion, rhinorrhea and sore throat. Eyes: Negative for discharge and redness. Respiratory: Negative for cough and shortness of breath. Cardiovascular: Negative for chest pain, palpitations and leg swelling. Gastrointestinal: Positive for abdominal pain and nausea. Negative for blood in stool, diarrhea and vomiting. Genitourinary: Negative for difficulty urinating, dysuria, flank pain, hematuria and urgency. Musculoskeletal: Negative for back pain and neck pain. Skin: Negative for rash and wound. Neurological: Negative for dizziness, syncope, weakness, numbness and headaches. Psychiatric/Behavioral: Negative for behavioral problems and confusion. Physical Exam  Vitals reviewed. Constitutional:       Appearance: She is well-developed. HENT:      Head: Normocephalic. Mouth/Throat:      Mouth: Mucous membranes are moist.   Eyes:      General: No scleral icterus. Cardiovascular:      Rate and Rhythm: Normal rate and regular rhythm. Heart sounds: Normal heart sounds. Pulmonary:      Effort: Pulmonary effort is normal. No respiratory distress. Breath sounds: No wheezing, rhonchi or rales. Chest:      Chest wall: No tenderness.    Abdominal:      General: Abdomen is flat. Bowel sounds are increased. There is no distension. Palpations: There is no hepatomegaly. Tenderness: There is generalized abdominal tenderness and tenderness in the left lower quadrant. There is guarding. There is no right CVA tenderness, left CVA tenderness or rebound. Skin:     General: Skin is warm. Capillary Refill: Capillary refill takes less than 2 seconds. Coloration: Skin is not jaundiced or pale. Findings: No rash. Neurological:      Mental Status: She is alert and oriented to person, place, and time. Motor: No weakness. Psychiatric:         Mood and Affect: Mood normal.         Behavior: Behavior normal.          Procedures   EKG: This EKG is signed by emergency department physician. Rate: 70  Rhythm: Sinus  Interpretation: no acute changes  Comparison: changes compared to previous EKG   MDM  Number of Diagnoses or Management Options  Diagnosis management comments: 14-year-old female presented emergency department with sudden onset of abdominal pain, nausea beginning at 5:30 PM today. Patient with previous history of colectomy in March. Decreased GFR noted, will plan to do CT without contrast.  fentanyl and Zofran ordered. EKG shows sinus rhythm, no indication of STEMI.                    --------------------------------------------- PAST HISTORY ---------------------------------------------  Past Medical History:  has a past medical history of Acute on chronic diastolic (congestive) heart failure ([facility] [address] Arthritis, GERD (gastroesophageal reflux disease), H/O cardiovascular stress test, History of blood transfusion, Hyperlipidemia, Migraines, and Thyroid disease. Past Surgical History:  has a past surgical history that includes Hysterectomy; Endoscopy, colon, diagnostic; Colonoscopy; fracture surgery; other surgical history (Left, 2-7-13); joint replacement; and colectomy (N/A, 3/27/2021). Social History:  reports that she has never smoked. She has never used smokeless tobacco. She reports that she does not drink alcohol and does not use drugs. Family History: family history includes Cancer in her sister. The patients home medications have been reviewed.     Allergies: Penicillins    -------------------------------------------------- RESULTS -------------------------------------------------    LABS:  Results for orders placed or performed during the hospital encounter of 11/25/21   CBC Auto Differential   Result Value Ref Range    WBC 8.4 4.5 - 11.5 E9/L    RBC 3.86 3.50 - 5.50 E12/L    Hemoglobin 10.1 (L) 11.5 - 15.5 g/dL    Hematocrit 33.2 (L) 34.0 - 48.0 %    MCV 86.0 80.0 - 99.9 fL    MCH 26.2 26.0 - 35.0 pg    MCHC 30.4 (L) 32.0 - 34.5 %    RDW 16.4 (H) 11.5 - 15.0 fL    Platelets 651 177 - 905 E9/L    MPV 10.4 7.0 - 12.0 fL    Neutrophils % 87.8 (H) 43.0 - 80.0 %    Immature Granulocytes % 0.4 0.0 - 5.0 %    Lymphocytes % 8.1 (L) 20.0 - 42.0 %    Monocytes % 3.2 2.0 - 12.0 %    Eosinophils % 0.0 0.0 - 6.0 %    Basophils % 0.5 0.0 - 2.0 %    Neutrophils Absolute 7.37 (H) 1.80 - 7.30 E9/L    Immature Granulocytes # 0.03 E9/L    Lymphocytes Absolute 0.68 (L) 1.50 - 4.00 E9/L    Monocytes Absolute 0.27 0.10 - 0.95 E9/L    Eosinophils Absolute 0.00 (L) 0.05 - 0.50 E9/L    Basophils Absolute 0.04 0.00 - 0.20 E9/L   Comprehensive Metabolic Panel w/ Reflex to MG   Result Value Ref Range    Sodium 138 132 - 146 mmol/L    Potassium reflex Magnesium 4.6 3.5 - 5.0 mmol/L    Chloride 102 98 - 107 mmol/L    CO2 22 22 - 29 mmol/L    Anion Gap 14 7 - 16 mmol/L    Glucose 183 (H) 74 - 99 mg/dL    BUN 22 6 - 23 mg/dL    CREATININE 1.7 (H) 0.5 - 1.0 mg/dL    GFR Non-African American 29 >=60 mL/min/1.73    GFR African American 35     Calcium 9.5 8.6 - 10.2 mg/dL    Total Protein 7.1 6.4 - 8.3 g/dL    Albumin 4.1 3.5 - 5.2 g/dL    Total Bilirubin <0.2 0.0 - 1.2 mg/dL    Alkaline Phosphatase 128 (H) 35 - 104 U/L    ALT 9 0 - 32 U/L    AST 20 0 - 31 U/L   Lipase Result Value Ref Range    Lipase 37 13 - 60 U/L   Troponin   Result Value Ref Range    Troponin, High Sensitivity 13 (H) 0 - 9 ng/L   Urinalysis, reflex to microscopic   Result Value Ref Range    Color, UA Yellow Straw/Yellow    Clarity, UA SLCLOUDY Clear    Glucose, Ur Negative Negative mg/dL    Bilirubin Urine Negative Negative    Ketones, Urine Negative Negative mg/dL    Specific Gravity, UA >=1.030 1.005 - 1.030    Blood, Urine TRACE (A) Negative    pH, UA 5.5 5.0 - 9.0    Protein, UA TRACE Negative mg/dL    Urobilinogen, Urine 0.2 <2.0 E.U./dL    Nitrite, Urine Negative Negative    Leukocyte Esterase, Urine TRACE (A) Negative   Lactic Acid, Plasma   Result Value Ref Range    Lactic Acid 1.1 0.5 - 2.2 mmol/L   Microscopic Urinalysis   Result Value Ref Range    WBC, UA 5-10 (A) 0 - 5 /HPF    RBC, UA 2-5 0 - 2 /HPF    Epithelial Cells, UA FEW /HPF    Bacteria, UA RARE (A) None Seen /HPF   Troponin   Result Value Ref Range    Troponin, High Sensitivity 12 (H) 0 - 9 ng/L   EKG 12 Lead   Result Value Ref Range    Ventricular Rate 70 BPM    Atrial Rate 70 BPM    P-R Interval 170 ms    QRS Duration 74 ms    Q-T Interval 416 ms    QTc Calculation (Bazett) 449 ms    P Axis 68 degrees    R Axis 2 degrees    T Axis 39 degrees       RADIOLOGY:  CT ABDOMEN PELVIS WO CONTRAST Additional Contrast? None   Final Result   Large hiatal hernia increased in size in the interval since the prior   examination with intrathoracic displacement of the gastric fundus. There is   dilatation of the gastric fundus and body of the stomach. Gastric outlet   obstruction or gastro paresis should be considered. Consider further   evaluation with oral contrast.      Punctate nonobstructive calcification in the left kidney measuring less than   2 mm. No obstructive uropathy. Dilatation of multiple segments of small bowel in the lower abdomen and   pelvis with air-fluid levels which may represent partial obstruction or ileus.       Small bowel feces seen adjacent to anastomotic surgical changes in the right   side of the pelvis which may represent a transition point. There is apparent focal narrowing and rotatory configuration of small bowel   in the right side of the pelvis. Small bowel volvulus cannot be excluded. Further evaluation with oral contrast would be helpful in further assessment   of these findings. Sigmoid diverticulosis with no evidence of diverticulitis.               ------------------------- NURSING NOTES AND VITALS REVIEWED ---------------------------  Date / Time Roomed:  11/25/2021 11:44 PM  ED Bed Assignment:  05/05    The nursing notes within the ED encounter and vital signs as below have been reviewed. Patient Vitals for the past 24 hrs:   BP Temp Temp src Pulse Resp SpO2 Weight   11/26/21 0000 (!) 161/88         11/25/21 2359       99 lb (44.9 kg)   11/25/21 2327 (!) 185/76    20     11/25/21 2243  98.7 °F (37.1 °C) Infrared 69  96 %        Oxygen Saturation Interpretation: Normal    ------------------------------------------ PROGRESS NOTES ------------------------------------------  Re-evaluation(s):  Time: 4181. Patients symptoms show no change  Repeat physical examination is not changed    Counseling:  I have spoken with the patient and discussed todays results, in addition to providing specific details for the plan of care and counseling regarding the diagnosis and prognosis. Their questions are answered at this time and they are agreeable with the plan of admission.    --------------------------------- ADDITIONAL PROVIDER NOTES ---------------------------------  Consultations:  Time: 0500. Spoke with Dr. [facility]. Discussed the case. He asks to have the patient admitted to medicine and he will provided consultation. Spoke with Dr. Rakesh Levin. Discussed case. They will admit the patient.   This patient's ED course included: a personal history and physicial examination, re-evaluation prior to disposition and IV medications    This patient has remained hemodynamically stable during their ED course. Diagnosis:  1. Partial small bowel obstruction (HCC)        Disposition:  Patient's disposition: Admit to med/surg floor  Patient's condition is stable. ATTENDING PROVIDER ATTESTATION:     I have personally performed and/or participated in the history, exam, medical decision making, and procedures and agree with all pertinent clinical information. I have also reviewed and agree with the past medical, family and social history unless otherwise noted. I have discussed this patient in detail with the resident, and provided the instruction and education regarding abdominal pain, nausea, vomiting and small bowel obstruction. My findings/Plan: Heart RRR. Lungs CTA bilaterally. Abdomen soft. Diffusely tender to palpation. Bowel sounds increased. Supportive care. Admit for further evaluation and treatment.                [address] [facility],   11/26/21 3578

## 2021-11-26 NOTE — H&P
Department of Internal Medicine  History and Physical     Admitting Physician: Dr. Bhumi Lopez  Consultants: Dr. Cesar Epp:  Abdominal pain    HISTORY OF PRESENT ILLNESS:    Zachariah Elizondo is a 48-year-old female who presented to [address] [place] [address] emergency department for the evaluation of sudden onset abdominal pain that began late in the evening yesterday. The patient was previously hospitalized in March where she underwent laparoscopic converted to open small bowel resection in the setting of small bowel obstruction. She states she had been doing well up until yesterday when she developed the sudden onset abdominal pain. It is located in the epigastric region with radiation to the back. Work-up in the emergency department revealed a large hiatal hernia that had increased in size with intrathoracic displacement of the fundus. There were also multiple dilated loops of small bowel possibly indicative of partial obstruction. She states her last bowel movement was yesterday morning and she has not passed any flatus since that time. She does not appear overtly uncomfortable during my examination. She states that she recently received a pain medication which has resolved her abdominal discomfort.   The general surgery team has been asked to provide consultation the patient has been admitted to the hospital.    PAST MEDICAL Hx:  Past Medical History:   Diagnosis Date    Acute on chronic diastolic (congestive) heart failure ([address] 9/29/2020    Arthritis     GERD (gastroesophageal reflux disease)     H/O cardiovascular stress test 10/03/2020    Lexiscan    History of blood transfusion     Hyperlipidemia     Migraines     Thyroid disease        PAST SURGICAL Hx:   Past Surgical History:   Procedure Laterality Date    COLECTOMY N/A 3/27/2021    LAPAROSCOPIC CONVERT TO OPEN SMALL BOWEL RESECTION performed by Kenya Gaspar MD at [address] [place] [address], [place], DIAGNOSTIC      FRACTURE SURGERY      left wrist, 2010-fx left hip    HYSTERECTOMY      JOINT REPLACEMENT      bilat knees    OTHER SURGICAL HISTORY Left 2-7-13    removal of hardware left hip left hip arthroplasty       FAMILY Hx:  Family History   Problem Relation Age of Onset    Cancer Sister         unsure        HOME MEDICATIONS:  Prior to Admission medications    Medication Sig Start Date End Date Taking? Authorizing Provider   ondansetron (ZOFRAN-ODT) 4 MG disintegrating tablet Take 1 tablet by mouth every 8 hours as needed for Nausea or Vomiting 4/2/21   Cooper Alvarez, DO   calcium carbonate (TUMS) 500 MG chewable tablet Take 2 tablets by mouth daily Once in a while a couple days ago    Historical Provider, MD   aspirin 81 MG chewable tablet Take 1 tablet by mouth daily 10/6/20   Yanni Pillow, DO   ferrous sulfate (IRON 325) 325 (65 Fe) MG tablet Take 1 tablet by mouth 2 times daily (with meals) 10/5/20   Cherwilmann Pillow, DO   metoprolol succinate (TOPROL XL) 25 MG extended release tablet Take 1 tablet by mouth daily 10/6/20   Cherwilmann Pillow, DO   rOPINIRole (REQUIP) 1 MG tablet Take 1 tablet by mouth nightly 10/5/20   Cherwilmann Pillow, DO   atorvastatin (LIPITOR) 20 MG tablet Take 1 tablet by mouth daily 10/6/20   Cherwilmann Pillow, DO   vitamin B-6 (B-6) 50 MG tablet Take 1 tablet by mouth daily 10/6/20   Marywilmann Pillow, DO   butalbital-aspirin-caffeine-codeine (FIORINAL/CODEINE #3) -25-30 MG capsule Take 1 capsule by mouth every 4-6 hours as needed for Pain.     Historical Provider, MD   levothyroxine (SYNTHROID) 75 MCG tablet Take 75 mcg by mouth Daily    Historical Provider, MD   mirtazapine (REMERON) 30 MG tablet Take 30 mg by mouth nightly    Historical Provider, MD   pantoprazole (PROTONIX) 40 MG tablet Take 40 mg by mouth daily    Historical Provider, MD   sertraline (ZOLOFT) 50 MG tablet Take 50 mg by mouth daily    Historical Provider, MD       ALLERGIES:  Penicillins    SOCIAL Hx:  Social History     Socioeconomic History    Marital status:      Spouse name: Not on file    Number of children: Not on file    Years of education: Not on file    Highest education level: Not on file   Occupational History    Not on file   Tobacco Use    Smoking status: Never Smoker    Smokeless tobacco: Never Used   Substance and Sexual Activity    Alcohol use: No    Drug use: No    Sexual activity: Not Currently   Other Topics Concern    Not on file   Social History Narrative    Not on file     Social Determinants of Health     Financial Resource Strain:     Difficulty of Paying Living Expenses: Not on file   Food Insecurity:     Worried About Running Out of Food in the Last Year: Not on file    Deann of Food in the Last Year: Not on file   Transportation Needs:     Lack of Transportation (Medical): Not on file    Lack of Transportation (Non-Medical):  Not on file   Physical Activity:     Days of Exercise per Week: Not on file    Minutes of Exercise per Session: Not on file   Stress:     Feeling of Stress : Not on file   Social Connections:     Frequency of Communication with Friends and Family: Not on file    Frequency of Social Gatherings with Friends and Family: Not on file    Attends [employer] Services: Not on file    Active Member of [address] [place] [address] or Organizations: Not on file    Attends Club or Organization Meetings: Not on file    Marital Status: Not on file   Intimate Partner Violence:     Fear of Current or Ex-Partner: Not on file    Emotionally Abused: Not on file    Physically Abused: Not on file    Sexually Abused: Not on file   Housing Stability:     Unable to Pay for Housing in the Last Year: Not on file    Number of [place] in the Last Year: Not on file    Unstable Housing in the Last Year: Not on file       ROS:  General:   Denies chills, fatigue, fever, malaise, night sweats or weight loss    Psychological:   Denies anxiety, disorientation or hallucinations    ENT:    Denies epistaxis, headaches, vertigo or visual changes    Cardiovascular:   Denies any chest pain, irregular heartbeats, or palpitations. No paroxysmal nocturnal dyspnea. Respiratory:   Denies shortness of breath, coughing, sputum production, hemoptysis, or wheezing. No orthopnea. Gastrointestinal:   Admits to the abdominal pain as described above. She denies any vomiting. She admits to dry heaves. Her last bowel movement was yesterday morning. She is not currently passing flatus. Genito-Urinary:    Denies any urgency, frequency, hematuria. Voiding without difficulty. Musculoskeletal:   Denies joint pain, joint stiffness, joint swelling or muscle pain    Neurology:    Denies any headache or focal neurological deficits. No weakness or paresthesia. Derm:    Denies any rashes, ulcers, or excoriations. Denies bruising. Extremities:   Denies any lower extremity swelling or edema. PHYSICAL EXAM:  VITALS:  Vitals:    11/26/21 0540   BP: (!) 160/85   Pulse: 79   Resp: 20   Temp:    SpO2: 97%         CONSTITUTIONAL:    Awake, alert, cooperative, no apparent distress, and appears stated age    EYES:    PERRL, EOMI, sclera clear, conjunctiva normal    ENT:    Normocephalic, atraumatic, sinuses nontender on palpation. External ears without lesions. Oral pharynx with moist mucus membranes. Tonsils without erythema or exudates. NECK:    Supple, symmetrical, trachea midline, no adenopathy, thyroid symmetric, not enlarged and no tenderness, skin normal, no bruits, no JVD    HEMATOLOGIC/LYMPHATICS:    No cervical lymphadenopathy and no supraclavicular lymphadenopathy    LUNGS:    Symmetric. No increased work of breathing, good air exchange, clear to auscultation bilaterally, no wheezes, rhonchi, or rales,     CARDIOVASCULAR:    Normal apical impulse, regular rate and rhythm, normal S1 and S2, no S3 or S4, and no murmur noted    ABDOMEN:    Mildly tender to palpation diffusely. Voluntary guarding is elicited.   Bowel sounds are hypoactive. MUSCULOSKELETAL:    There is no redness, warmth, or swelling of the joints. Full range of motion noted. Motor strength is 5 out of 5 all extremities bilaterally. Tone is normal.    NEUROLOGIC:    Awake, alert, oriented to name, place and time. Cranial nerves II-XII are grossly intact. Motor is 5 out of 5 bilaterally. SKIN:    No bruising or bleeding. No redness, warmth, or swelling    EXTREMITIES:    Peripheral pulses present. No edema, cyanosis, or swelling. OSTEOPATHIC:    Examined in seated and supine positions. Normal thoracic kyphosis and lumbar lordosis. No acute somatic dysfunction. LABORATORY DATA:  CBC with Differential:    Lab Results   Component Value Date    WBC 8.4 11/26/2021    RBC 3.86 11/26/2021    HGB 10.1 11/26/2021    HCT 33.2 11/26/2021     11/26/2021    MCV 86.0 11/26/2021    MCH 26.2 11/26/2021    MCHC 30.4 11/26/2021    RDW 16.4 11/26/2021    NRBC 1.0 10/05/2020    SEGSPCT 62 02/09/2013    LYMPHOPCT 8.1 11/26/2021    MONOPCT 3.2 11/26/2021    MYELOPCT 1.0 10/05/2020    BASOPCT 0.5 11/26/2021    MONOSABS 0.27 11/26/2021    LYMPHSABS 0.68 11/26/2021    EOSABS 0.00 11/26/2021    BASOSABS 0.04 11/26/2021     BMP:    Lab Results   Component Value Date     11/26/2021    K 4.6 11/26/2021     11/26/2021    CO2 22 11/26/2021    BUN 22 11/26/2021    LABALBU 4.1 11/26/2021    LABALBU 3.5 06/09/2011    CREATININE 1.7 11/26/2021    CALCIUM 9.5 11/26/2021    GFRAA 35 11/26/2021    LABGLOM 29 11/26/2021    GLUCOSE 183 11/26/2021    GLUCOSE 167 06/10/2011       ASSESSMENT:  1. Intractable abdominal pain with radiographic evidence of an enlarging hiatal hernia with displacement of the gastric fundus  2. Multiple dilated loops of bowel likely compatible with partial obstruction with consideration for small bowel volvulus  3.  Recent hospitalization in March in the setting of a small bowel obstruction requiring laparoscopic converted to open small bowel resection  4. Chronic, compensated diastolic congestive heart failure  5. Chronic kidney disease stage III  6. Hyperlipidemia  7. Hypothyroidism  8. Gastroesophageal reflux disease  9. Moderate protein calorie malnutrition    PLAN:  The patient's pain seems to be much better controlled when compared to initial presentation to the hospital.  The general surgery team has been updated and will evaluate the patient today. CT of the abdomen is markedly abnormal and may need to be repeated with oral contrast.  We will provide gentle IV fluid resuscitation along with antiemetic and pain medications. Protonix will be administered. If surgical intervention is warranted, the patient is cleared for surgery. This will be deferred to the general surgery team.    Due to extremely high hospital inpatient census and bed limitations, along with staff shortages, we have had a lc discussion with the patient/family regarding the likelihood of prolonged ER boarding status and potential delays/disruptions in care related to this. They understand and agree to maintain hospitalization at this facility while accepting these risks. We have made every attempt to coordinate care with the ER nursing staff as well to avoid any disruptions in care.       Yasmine Nugent DO, D.O., [place]  6:25 AM  11/26/2021    Electronically signed by Yasmine Nugent DO on 11/26/21 at 6:25 AM EST

## 2021-11-26 NOTE — CARE COORDINATION
11/26/2021: SS Note/Discharge plan:  Met with pt for transition of care planning, pt currently a&o, pleasant, reviewed therapy evals, pt plans to return home, pt says she did have [facility] in the past but could not recall the name of the agency but pt did not anticipate needing home health therapy on discharge, pt says she functioned independently prior to admission, relayed no dme needs or past SNF/Rehab placements, pt says her son lives with her and she has 3 daughters who help her as needed and will provide her transport home, currently relayed no needs for discharge. Electronically signed by CAROLINA Pan on 11/26/2021 at 2:52 PM

## 2021-11-26 NOTE — PROGRESS NOTES
Physical Therapy Initial Evaluation/Plan of Care    Room #:  0331/0331-01  Patient Name: Reshma Lopez  YOB: 1945  MRN: [id number]    Date of Service: 11/26/2021     Tentative placement recommendation: Home  Equipment recommendation: None      Evaluating Physical Therapist: Kaylie Thorne, PT Lic. #[id number]      Specific Provider Orders/Date/Referring Provider : Mason Orr, DO11/26/21 0630   PT eval and treat (PT/OT CONSULT PANEL) ONE TIME Complete Discontinue    11/26/21 1983      Admitting Diagnosis:   SBO (small bowel obstruction) ([facility]) [K56.609]  Partial small bowel obstruction ([address] [K56.600]       Surgery: none  Visit Diagnoses       Codes    Partial small bowel obstruction ([address]    -  Primary K56.600          Patient Active Problem List   Diagnosis    Arthritis, hip    Avascular necrosis of bone of hip ([address]    HLD (hyperlipidemia)    Migraine    PUD (peptic ulcer disease)    Hypothyroid    Transient cerebral ischemia    [place] (acute kidney injury) ([address]    Acute on chronic diastolic (congestive) heart failure ([address]    CHF (congestive heart failure), NYHA class I, acute on chronic, combined (HCC)    Precordial pain    SOB (shortness of breath)    Enteritis    Small bowel obstruction (HCC)    SBO (small bowel obstruction) ([facility])        ASSESSMENT of Current Deficits    Patient Independent with all functional mobility. Pt has no PT needs at this time. Pt is discharged from PT at this time. Please reorder if status changes. Thank you for this referal             PHYSICAL THERAPY  PLAN OF CARE       Physical therapy plan of care is established based on physician order,  patient diagnosis and clinical assessment    Current Treatment Recommendations:   Patient Independent with all functional mobility. Pt has no PT needs at this time. Pt is discharged from PT at this time. Please reorder if status changes.   Thank you for this referal                PT long term treatment goals are located in person for climbing 3-5 steps with a railing?: None  AM-PAC Inpatient Mobility Raw Score : 24  AM-PAC Inpatient T-Scale Score : 61.14  Mobility Inpatient CMS 0-100% Score: 0  Mobility Inpatient CMS G-Code Modifier : [place] cleared patient for PT evaluation. The admitting diagnosis and active problem list as listed above have been reviewed prior to the initiation of this evaluation. OBJECTIVE;   Initial Evaluation  Date: 11/26/2021   Was pt agreeable to Eval/treatment? Yes   Pain level   5/10  abdomen   Bed Mobility    Rolling: Independent    Supine to sit: Independent    Sit to supine: Independent    Scooting: Independent     Transfers Sit to stand: Independent     Ambulation    50 feet using  no device with Independent       Stair negotiation: ascended and descended   Not assessed    ROM Within functional limits     Strength BUE:  refer to OT eval  RLE:  5/5  LLE:  5/5   Balance Sitting EOB:  good    Dynamic Standing:  good       Patient is Alert & Oriented x person, place, time and situation and follows directions    Sensation:  Patient  denies numbness/tingling   Edema:  no   Endurance: good            Patient education  Patient educated on role of Physical Therapy, risks of immobility, safety and plan of care and  importance of mobility while in hospital      Patient response to education:   Pt verbalized understanding Pt demonstrated skill Pt requires further education in this area   Yes Yes No      Treatment:  Eval only            At end of session, patient in bed with  call light and phone within reach,  all lines and tubes intact, nursing notified. Son present        Patient Independent with all functional mobility. Pt has no PT needs at this time. Pt is discharged from PT at this time. Please reorder if status changes.   Thank you for this referal             Patient's/ family goals   home    Time in  11:30  Time out  11:50    Total Treatment Time  0 minutes    Evaluation time includes thorough

## 2021-11-26 NOTE — PROGRESS NOTES
[address] [facility] [address]  [facility] [place]. [place]        Date:2021                                                  Patient Name: Molly Cason    MRN: [id number]    : 1945    Room: [facility]-      Evaluating OT: Marysol Blum OTR/L #[id number]     Referring Provider and Specific Provider Orders/Date:      21  OT eval and treat  Start:  21,   End:  21,   ONE TIME,   Standing Count:  1 Occurrences,   R         Edith Nicole, DO      Placement Recommendation: Home - OT discharge recommendation pending patient's progress with skilled services. Diagnosis:   1.  Partial small bowel obstruction (HCC)           Surgery: None at this time       Pertinent Medical History:       Past Medical History:   Diagnosis Date    Acute on chronic diastolic (congestive) heart failure (HCC) 2020    Arthritis     GERD (gastroesophageal reflux disease)     H/O cardiovascular stress test 10/03/2020    Lexiscan    History of blood transfusion     Hyperlipidemia     Migraines     Thyroid disease          Past Surgical History:   Procedure Laterality Date    COLECTOMY N/A 2021    OPEN SMALL BOWEL RESECTION by [facility]    COLONOSCOPY      ENDOSCOPY, COLON, DIAGNOSTIC      FRACTURE SURGERY      left wrist, 2010-fx left hip    HYSTERECTOMY      JOINT REPLACEMENT      bilat knees    OTHER SURGICAL HISTORY Left 2013    removal of hardware left hip left hip arthroplasty        Precautions:  Fall Risk      Assessment of current deficits    [x] Functional mobility  [x]ADLs  [x] Strength               []Cognition    [x] Functional transfers   [x] IADLs         [] Safety Awareness   [x]Endurance    [] Fine Coordination              [x] Balance      [] Vision/perception   []Sensation     []Gross Motor Coordination  [] ROM  [] Delirium                   [] Motor Control     OT PLAN OF CARE   OT POC based on physician orders, patient diagnosis and results of clinical assessment    Frequency/Duration 1-3 days/wk for 2 weeks PRN     Specific OT Treatment Interventions to include:   * Instruction/training on adapted ADL techniques and AE recommendations to increase functional independence within precautions       * Training on energy conservation strategies, correct breathing pattern and techniques to improve independence/tolerance for self-care routine  * Functional transfer/mobility training/DME recommendations for increased independence, safety, and fall prevention  * Patient/Family education to increase follow through with safety techniques and functional independence  * Recommendation of environmental modifications for increased safety with functional transfers/mobility and ADLs  * Therapeutic activities to facilitate/challenge dynamic balance, stand tolerance for increased safety and independence with ADLs  * Therapeutic activities to facilitate gross/fine motor skills for increased independence with ADLs    Recommended Adaptive Equipment: TBD      Home Living: with son; single family home, 2 story, 4 steps to enter with rail. Bedroom located on 2nd floor with approx. 11 steps to 2nd level. Full bathroom located on both 1st and 2nd floor. Bathroom set-up: walk-in shower on 1st floor. Equipment owned: wheeled walker, cane, wheelchair, bedside commode, shower chair. Prior Level of Function: Independent with ADLs , Independent with IADLs; ambulated independently without AD. Driving: Yes  Occupation: Retired   Enjoys: n/a      Pain Level: 8/10 pain in abdominal area as well as headache; Nursing informed/aware.       Cognition: A&O: 4/4; Follows 3 step directions   Memory: good    Sequencing: good    Problem solving: good    Judgement/safety: good     [facility]   AM-PAC Daily Activity Inpatient   How much help for putting on and taking off regular lower body clothing?: A Little  How much help for Bathing?: A Little  How much help for Toileting?: A Little  How much help for putting on and taking off regular upper body clothing?: A Little  How much help for taking care of personal grooming?: A Little  How much help for eating meals?: None  AM-PAC Inpatient Daily Activity Raw Score: 19  AM-PAC Inpatient ADL T-Scale Score : 40.22  ADL Inpatient CMS 0-100% Score: 42.8  ADL Inpatient CMS G-Code Modifier : CK     Functional Assessment:    Initial Eval Status  Date: 11/26/21 Treatment Status  Date: STGs = LTGs  Time frame: 10-14 days   Feeding Independent   Pt seen brining cup to mouth to take sip during this session      Grooming Supervision   With set-up assist seated at table-top. Independent    UB Dressing Supervision     Independent    LB Dressing Supervision   To doff/don pants over hips standing unsupported. Pt demo'd ability to doff/don slip on shoes via cross leg technique while seated. Independent    Bathing Supervision   Independent    Toileting Supervision   Seated on commode for perineal hygiene. Independent    Bed Mobility  Supine to sit: Supervision   Sit to supine: Supervision     Supine to sit: Independent   Sit to supine: Independent    Functional Transfers Stand by Assist from EOB and sit <> stand transfer from household surfaces. Independent    Functional Mobility Stand by Assist without AD to/from bathroom to improve balance, verbal cues for walker sequence and safety.    Independent    Balance Sitting:     Static: good     Dynamic: good   Standing: fair plus   Sitting:     Static: good     Dynamic: good   Standing: good     Activity Tolerance fair  ; pt limited by pain   Increase standing tolerance >3 minutes for improved engagement with functional transfers and indep in ADLs   Visual/  Perceptual Glasses: Not reported      NA      Hand Dominance: Not reported      AROM (PROM) Strength Additional Info:    RUE  WFL 4-/5 good  and wfl FMC/dexterity noted during ADL tasks     LUE WFL 4-/5 good  and wfl FMC/dexterity noted during ADL tasks       Hearing:  [facility]   Sensation:   No c/o numbness or tingling  Tone:  WFL   Edema:      Vitals:  HR at rest:  bpm HR with activity:  bpm HR at end of session:  bpm   SpO2 at rest: % SpO2 with activity: % SpO2 at end of session: %   BP at rest:  BP with activity:  BP at end of session:      Comments: RN cleared patient for OT. Upon arrival patient in supine. Therapist facilitated and instructed pt on adapted  techniques & compensatory strategies to improve safety and independence with basic ADLs, bed mobility, functional transfers and mobility to allow pt to achieve highest level of independence and safely. Pt demonstrated good understanding of education & follow through. Pt unable to tolerate grooming tasks standing at bathroom sink to increasing pain with activity. Nursing notified of patient's c/o pain. At end of session, patient was supine with call light and phone within reach. Overall, patient demonstrated  decreased independence and safety during completion of ADL tasks. Pt would benefit from continued skilled OT to increase safety and independence with completion of ADL tasks and functional mobility for improved quality of life. Rehab Potential: Good for established goals. Patient / Family Goal: Pt in agreement with POC       Patient and/or family were instructed on functional diagnosis, prognosis/goals and OT plan of care. Demonstrated good understanding.      Eval Complexity: Low    Time In: 8:55 AM   Time Out: 9:15 AM    Total Treatment Time: 0       Min Units   OT Eval Low 97165  X  1    OT Eval Medium 07695      OT Eval High 33506      OT Re-Eval [id number]            ADL/Self Care 13144     Therapeutic Activities 37123       Therapeutic Ex 23713       Orthotic Management 77146       Manual 25197     Neuro Re-Ed 27455       Non-Billable Time        Evaluation Time additionally includes thorough review of current medical information, gathering information on past medical history/social history and prior level of function, interpretation of standardized testing/informal observation of tasks, assessment of data and development of plan of care and goals.         Evaluating OT: Dianna Dorantes OTR/L #[id number]

## 2021-11-27 LAB
ANION GAP SERPL CALCULATED.3IONS-SCNC: 8 MMOL/L (ref 7–16)
BASOPHILS ABSOLUTE: 0.04 E9/L (ref 0–0.2)
BASOPHILS RELATIVE PERCENT: 0.7 % (ref 0–2)
BUN BLDV-MCNC: 18 MG/DL (ref 6–23)
CALCIUM SERPL-MCNC: 8.8 MG/DL (ref 8.6–10.2)
CHLORIDE BLD-SCNC: 112 MMOL/L (ref 98–107)
CO2: 23 MMOL/L (ref 22–29)
CREAT SERPL-MCNC: 1.6 MG/DL (ref 0.5–1)
EKG ATRIAL RATE: 70 BPM
EKG P AXIS: 68 DEGREES
EKG P-R INTERVAL: 170 MS
EKG Q-T INTERVAL: 416 MS
EKG QRS DURATION: 74 MS
EKG QTC CALCULATION (BAZETT): 449 MS
EKG R AXIS: 2 DEGREES
EKG T AXIS: 39 DEGREES
EKG VENTRICULAR RATE: 70 BPM
EOSINOPHILS ABSOLUTE: 0.03 E9/L (ref 0.05–0.5)
EOSINOPHILS RELATIVE PERCENT: 0.5 % (ref 0–6)
GFR AFRICAN AMERICAN: 38
GFR NON-AFRICAN AMERICAN: 31 ML/MIN/1.73
GLUCOSE BLD-MCNC: 101 MG/DL (ref 74–99)
HCT VFR BLD CALC: 24.5 % (ref 34–48)
HEMOGLOBIN: 7.8 G/DL (ref 11.5–15.5)
IMMATURE GRANULOCYTES #: 0.02 E9/L
IMMATURE GRANULOCYTES %: 0.4 % (ref 0–5)
LYMPHOCYTES ABSOLUTE: 2 E9/L (ref 1.5–4)
LYMPHOCYTES RELATIVE PERCENT: 36.2 % (ref 20–42)
MAGNESIUM: 2.1 MG/DL (ref 1.6–2.6)
MCH RBC QN AUTO: 26.5 PG (ref 26–35)
MCHC RBC AUTO-ENTMCNC: 31.8 % (ref 32–34.5)
MCV RBC AUTO: 83.3 FL (ref 80–99.9)
MONOCYTES ABSOLUTE: 0.68 E9/L (ref 0.1–0.95)
MONOCYTES RELATIVE PERCENT: 12.3 % (ref 2–12)
NEUTROPHILS ABSOLUTE: 2.76 E9/L (ref 1.8–7.3)
NEUTROPHILS RELATIVE PERCENT: 49.9 % (ref 43–80)
PDW BLD-RTO: 16.2 FL (ref 11.5–15)
PHOSPHORUS: 2.9 MG/DL (ref 2.5–4.5)
PLATELET # BLD: 279 E9/L (ref 130–450)
PMV BLD AUTO: 10.2 FL (ref 7–12)
POTASSIUM SERPL-SCNC: 4.7 MMOL/L (ref 3.5–5)
RBC # BLD: 2.94 E12/L (ref 3.5–5.5)
SODIUM BLD-SCNC: 143 MMOL/L (ref 132–146)
WBC # BLD: 5.5 E9/L (ref 4.5–11.5)

## 2021-11-27 PROCEDURE — G0378 HOSPITAL OBSERVATION PER HR: HCPCS

## 2021-11-27 PROCEDURE — 96372 THER/PROPH/DIAG INJ SC/IM: CPT

## 2021-11-27 PROCEDURE — 6370000000 HC RX 637 (ALT 250 FOR IP): Performed by: INTERNAL MEDICINE

## 2021-11-27 PROCEDURE — 2500000003 HC RX 250 WO HCPCS: Performed by: INTERNAL MEDICINE

## 2021-11-27 PROCEDURE — 80048 BASIC METABOLIC PNL TOTAL CA: CPT

## 2021-11-27 PROCEDURE — 84100 ASSAY OF PHOSPHORUS: CPT

## 2021-11-27 PROCEDURE — 96376 TX/PRO/DX INJ SAME DRUG ADON: CPT

## 2021-11-27 PROCEDURE — 99233 SBSQ HOSP IP/OBS HIGH 50: CPT | Performed by: SURGERY

## 2021-11-27 PROCEDURE — 85025 COMPLETE CBC W/AUTO DIFF WBC: CPT

## 2021-11-27 PROCEDURE — 1200000000 HC SEMI PRIVATE

## 2021-11-27 PROCEDURE — 6360000002 HC RX W HCPCS: Performed by: INTERNAL MEDICINE

## 2021-11-27 PROCEDURE — 36415 COLL VENOUS BLD VENIPUNCTURE: CPT

## 2021-11-27 PROCEDURE — 83735 ASSAY OF MAGNESIUM: CPT

## 2021-11-27 PROCEDURE — 6370000000 HC RX 637 (ALT 250 FOR IP): Performed by: SURGERY

## 2021-11-27 RX ORDER — LEVOTHYROXINE SODIUM 0.07 MG/1
75 TABLET ORAL DAILY
Status: DISCONTINUED | OUTPATIENT
Start: 2021-11-27 | End: 2021-11-28 | Stop reason: HOSPADM

## 2021-11-27 RX ORDER — MIRTAZAPINE 15 MG/1
30 TABLET, ORALLY DISINTEGRATING ORAL NIGHTLY
Status: DISCONTINUED | OUTPATIENT
Start: 2021-11-27 | End: 2021-11-28 | Stop reason: HOSPADM

## 2021-11-27 RX ORDER — BISACODYL 10 MG
10 SUPPOSITORY, RECTAL RECTAL ONCE
Status: COMPLETED | OUTPATIENT
Start: 2021-11-27 | End: 2021-11-27

## 2021-11-27 RX ORDER — PANTOPRAZOLE SODIUM 40 MG/1
40 TABLET, DELAYED RELEASE ORAL
Status: DISCONTINUED | OUTPATIENT
Start: 2021-11-27 | End: 2021-11-28 | Stop reason: HOSPADM

## 2021-11-27 RX ORDER — ATORVASTATIN CALCIUM 20 MG/1
20 TABLET, FILM COATED ORAL NIGHTLY
Status: DISCONTINUED | OUTPATIENT
Start: 2021-11-27 | End: 2021-11-28 | Stop reason: HOSPADM

## 2021-11-27 RX ORDER — POLYETHYLENE GLYCOL 3350 17 G/17G
17 POWDER, FOR SOLUTION ORAL 2 TIMES DAILY
Status: DISCONTINUED | OUTPATIENT
Start: 2021-11-27 | End: 2021-11-28 | Stop reason: HOSPADM

## 2021-11-27 RX ORDER — METOPROLOL SUCCINATE 25 MG/1
25 TABLET, EXTENDED RELEASE ORAL DAILY
Status: DISCONTINUED | OUTPATIENT
Start: 2021-11-27 | End: 2021-11-28 | Stop reason: HOSPADM

## 2021-11-27 RX ORDER — ROPINIROLE 1 MG/1
1 TABLET, FILM COATED ORAL NIGHTLY
Status: DISCONTINUED | OUTPATIENT
Start: 2021-11-27 | End: 2021-11-28 | Stop reason: HOSPADM

## 2021-11-27 RX ADMIN — METOPROLOL TARTRATE 5 MG: 1 INJECTION, SOLUTION INTRAVENOUS at 03:24

## 2021-11-27 RX ADMIN — ENOXAPARIN SODIUM 30 MG: 100 INJECTION SUBCUTANEOUS at 09:34

## 2021-11-27 RX ADMIN — MIRTAZAPINE 30 MG: 15 TABLET, ORALLY DISINTEGRATING ORAL at 20:00

## 2021-11-27 RX ADMIN — POLYETHYLENE GLYCOL 3350 17 G: 17 POWDER, FOR SOLUTION ORAL at 20:00

## 2021-11-27 RX ADMIN — PANTOPRAZOLE SODIUM 40 MG: 40 TABLET, DELAYED RELEASE ORAL at 09:35

## 2021-11-27 RX ADMIN — SERTRALINE HYDROCHLORIDE 50 MG: 50 TABLET ORAL at 09:35

## 2021-11-27 RX ADMIN — ATORVASTATIN CALCIUM 20 MG: 20 TABLET, FILM COATED ORAL at 20:00

## 2021-11-27 RX ADMIN — POTASSIUM CHLORIDE AND SODIUM CHLORIDE: 900; 150 INJECTION, SOLUTION INTRAVENOUS at 13:44

## 2021-11-27 RX ADMIN — LEVOTHYROXINE SODIUM 75 MCG: 0.07 TABLET ORAL at 09:35

## 2021-11-27 RX ADMIN — METOPROLOL SUCCINATE 25 MG: 25 TABLET, EXTENDED RELEASE ORAL at 09:35

## 2021-11-27 RX ADMIN — ROPINIROLE HYDROCHLORIDE 1 MG: 1 TABLET, FILM COATED ORAL at 20:00

## 2021-11-27 RX ADMIN — POLYETHYLENE GLYCOL 3350 17 G: 17 POWDER, FOR SOLUTION ORAL at 09:35

## 2021-11-27 RX ADMIN — POTASSIUM CHLORIDE AND SODIUM CHLORIDE: 900; 150 INJECTION, SOLUTION INTRAVENOUS at 19:58

## 2021-11-27 RX ADMIN — BISACODYL 10 MG: 10 SUPPOSITORY RECTAL at 09:42

## 2021-11-27 ASSESSMENT — PAIN SCALES - GENERAL: PAINLEVEL_OUTOF10: 0

## 2021-11-27 ASSESSMENT — PAIN DESCRIPTION - PROGRESSION: CLINICAL_PROGRESSION: OTHER (COMMENT)

## 2021-11-27 NOTE — PROGRESS NOTES
Internal Medicine Progress Note    [facility]    Parminder Nash. Darby Nayak., F.A.NAWAFOAneudyI. Ricki Bob D.O., STEPHANIE Lindsay D.O. Consuelo Holbrook, MSN, APRN, NP-C  Robyn Londono. Ina Gerardo, MSN, APRN-CNP     Primary Care Physician: Letty Dnucan MD   Admitting Physician:  Melanie Acuna DO  Admission date and time: 11/25/2021 11:44 PM    Room:  03310331-01  Admitting diagnosis: SBO (small bowel obstruction) ([facility] [K56.609]  Partial small bowel obstruction [facility]) [K56.600]    Patient Name: Liudmila Jaime  MRN: [id number]    Date of Service: 11/27/2021     Subjective:  Sean García is a 67 y.o. female who was seen and examined today,11/27/2021, at the bedside. Sean García is resting far more comfortably during my examination today. She did have a small bowel movement yesterday. Diet was advanced and she only tolerated minimal ingestion. She does have far less abdominal discomfort. We discussed becoming more ambulatory today and she voiced understanding and agreement. Review of System:   Constitutional:   Denies fever or chills, weight loss or gain, fatigue or malaise. HEENT:   Denies ear pain, sore throat, sinus or eye problems. Cardiovascular:   Denies any chest pain, irregular heartbeats, or palpitations. Respiratory:   Denies shortness of breath, coughing, sputum production, hemoptysis, or wheezing. Gastrointestinal:   Persistent but far less abdominal pain today. Diet was advanced yesterday and she tolerated this mildly. She did have a bowel movement yesterday. Genitourinary:    Denies any urgency, frequency, hematuria. Voiding  without difficulty. Extremities:   Denies lower extremity swelling, edema or cyanosis. Neurology:    Denies any headache or focal neurological deficits, Denies generalized weakness or memory difficulty. Psch:   Denies being anxious or depressed. Musculoskeletal:    Denies  myalgias, joint complaints or back pain.    Integumentary: Denies any rashes, ulcers, or excoriations. Denies bruising. Hematologic/Lymphatic:  Denies bruising or bleeding. Physical Exam:  No intake/output data recorded. Intake/Output Summary (Last 24 hours) at 11/27/2021 0815  Last data filed at 11/27/2021 0555  Gross per 24 hour   Intake 1420 ml   Output    Net 1420 ml   I/O last 3 completed shifts: In: 1663 [P.O.:660; I.V.:760]  Out: -   Patient Vitals for the past 96 hrs (Last 3 readings):   Weight   11/25/21 2359 99 lb (44.9 kg)     Vital Signs:   Blood pressure 122/60, pulse 55, temperature 98.2 °F (36.8 °C), temperature source Oral, resp. rate 16, weight 99 lb (44.9 kg), SpO2 96 %. General appearance:  Alert, responsive, oriented to person, place, and time. Well preserved, alert, no distress. Head:  Normocephalic. No masses, lesions or tenderness. Eyes:  PERRLA. EOMI. Sclera clear. Buccal mucosa moist.  ENT:  Ears normal. Mucosa normal. Edentulous in the setting of tooth extraction. No signs of infection. Neck:    Supple. Trachea midline. No thyromegaly. No JVD. No bruits. Heart:    Rhythm regular. Rate controlled. No murmurs. Lungs:    Symmetrical. Clear to auscultation bilaterally. No wheezes. No rhonchi. No rales. Abdomen:   Soft. Mildly tender to palpation diffusely. Significantly better than on my examination yesterday. Extremities:    Peripheral pulses present. No peripheral edema. No ulcers. No cyanosis. No clubbing. Neurologic:    Alert x 3. No focal deficit. Cranial nerves grossly intact. No focal weakness. Psych:   Behavior is normal. Mood appears normal. Speech is not rapid and/or pressured. Musculoskeletal:   Spine ROM normal. Muscular strength intact. Gait not assessed. Integumentary:  No rashes  Skin normal color and texture.   Genitalia/Breast:  Deferred    Medication:  Scheduled Meds:   metoprolol  5 mg IntraVENous Q6H    pantoprazole  40 mg IntraVENous BID    enoxaparin  30 mg SubCUTAneous Daily     Continuous Infusions:   0.9% NaCl with KCl 20 mEq 75 mL/hr at 11/26/21 8180       Objective Data:  CBC with Differential:    Lab Results   Component Value Date    WBC 5.5 11/27/2021    RBC 2.94 11/27/2021    HGB 7.8 11/27/2021    HCT 24.5 11/27/2021     11/27/2021    MCV 83.3 11/27/2021    MCH 26.5 11/27/2021    MCHC 31.8 11/27/2021    RDW 16.2 11/27/2021    NRBC 1.0 10/05/2020    SEGSPCT 62 02/09/2013    LYMPHOPCT 36.2 11/27/2021    MONOPCT 12.3 11/27/2021    MYELOPCT 1.0 10/05/2020    BASOPCT 0.7 11/27/2021    MONOSABS 0.68 11/27/2021    LYMPHSABS 2.00 11/27/2021    EOSABS 0.03 11/27/2021    BASOSABS 0.04 11/27/2021     BMP:    Lab Results   Component Value Date     11/27/2021    K 4.7 11/27/2021    K 4.6 11/26/2021     11/27/2021    CO2 23 11/27/2021    BUN 18 11/27/2021    LABALBU 4.1 11/26/2021    LABALBU 3.5 06/09/2011    CREATININE 1.6 11/27/2021    CALCIUM 8.8 11/27/2021    GFRAA 38 11/27/2021    LABGLOM 31 11/27/2021    GLUCOSE 101 11/27/2021    GLUCOSE 167 06/10/2011       Assessment:  1. Intractable abdominal pain with radiographic evidence of an enlarging hiatal hernia with displacement of the gastric fundus  2. Multiple dilated loops of bowel likely compatible with partial obstruction with consideration for small bowel volvulus  3. Recent hospitalization in March in the setting of a small bowel obstruction requiring laparoscopic converted to open small bowel resection  4. Chronic, compensated diastolic congestive heart failure  5. Chronic kidney disease stage III  6. Hyperlipidemia  7. Hypothyroidism  8. Gastroesophageal reflux disease  9. Moderate protein calorie malnutrition    Plan:   The patient's abdominal pain has improved dramatically and she is now having bowel movements. No plans for surgery as per surgical consultation. Diet is being advanced and we discussed the need for smaller more frequent meals. Laxative will be added as well.  I have encouraged her to become increasingly ambulatory today to promote gut motility. With ongoing improvement, anticipate discharging the patient home tomorrow. Continue current therapy. See orders for further plan of care. More than 50% of my time was spent at the bedside counseling/coordinating care with the patient and/or family with face to face contact. This time was spent reviewing notes and laboratory data as well as instructing and counseling the patient. Time I spent with the family or surrogate(s) is included only if the patient was incapable of providing the necessary information or participating in medical decisions. I also discussed the differential diagnosis and all of the proposed management plans with the patient and individuals accompanying the patient. I am readily available for any further decision-making and intervention.        Melanie Acuna DO, F.A.C.O.I.  11/27/2021  8:15 AM

## 2021-11-27 NOTE — PROGRESS NOTES
Noé Kam  11/27/2021      Surgical Progress Note    Noé Kam is a 67 y.o. female with no abdominal pain, bowels moved a small amount after Dulcolax and Myralax. No nausea. CT scan shows a large hiatal hernia with much of the stomach in the chest and distended loops of small bowel with a large amount of stool or undigested food in the distal small bowel consistent with ileus or obstruction, no oral contrast given. Repeat CT with oral contrast showed contrast in the transverse colon and the stool slowly passing distally. History:   3/27/2021 closed loop small bowel obstruction, open resection of a segment of ischemic bowel. Weight: 99 lb (44.9 kg). Past Medical History: She has a past medical history of Acute on chronic diastolic (congestive) heart failure ([address] Arthritis, GERD (gastroesophageal reflux disease), H/O cardiovascular stress test, History of blood transfusion, Hyperlipidemia, Migraines, and Thyroid disease. Past Surgical History: She has a past surgical history that includes Hysterectomy; Endoscopy, colon, diagnostic; Colonoscopy; fracture surgery; other surgical history (Left, 02/07/2013); joint replacement; and colectomy (N/A, 03/27/2021). Allergies: Penicillins    Home Medicines:   Prior to Visit Medications    Medication Sig Taking?  Authorizing Provider   ondansetron (ZOFRAN-ODT) 4 MG disintegrating tablet Take 1 tablet by mouth every 8 hours as needed for Nausea or Vomiting  Keiko Frias, DO   calcium carbonate (TUMS) 500 MG chewable tablet Take 2 tablets by mouth daily Once in a while a couple days ago  Historical Provider, MD   aspirin 81 MG chewable tablet Take 1 tablet by mouth daily  Lonell Backer, DO   ferrous sulfate (IRON 325) 325 (65 Fe) MG tablet Take 1 tablet by mouth 2 times daily (with meals)  Lonell Backer, DO   metoprolol succinate (TOPROL XL) 25 MG extended release tablet Take 1 tablet by mouth daily  Lonell Backer, DO   rOPINIRole (REQUIP) 1 MG tablet Take 1 tablet by mouth nightly  [place] Peto, DO   atorvastatin (LIPITOR) 20 MG tablet Take 1 tablet by mouth daily  [place] Peto, DO   vitamin B-6 (B-6) 50 MG tablet Take 1 tablet by mouth daily  [place] Peto, DO   butalbital-aspirin-caffeine-codeine (FIORINAL/CODEINE #3) -13-30 MG capsule Take 1 capsule by mouth every 4-6 hours as needed for Pain. Historical Provider, MD   levothyroxine (SYNTHROID) 75 MCG tablet Take 75 mcg by mouth Daily  Historical Provider, MD   mirtazapine (REMERON) 30 MG tablet Take 30 mg by mouth nightly  Historical Provider, MD   pantoprazole (PROTONIX) 40 MG tablet Take 40 mg by mouth daily  Historical Provider, MD   sertraline (ZOLOFT) 50 MG tablet Take 50 mg by mouth daily  Historical Provider, MD       Social History:   TOBACCO:   reports that she has never smoked. She has never used smokeless tobacco.  ETOH:    reports no history of alcohol use. Problem Relation Age of Onset    Cancer Sister         unsure         REVIEW OF SYSTEMS:  Constitutional: negative  Respiratory: negative  Cardiovascular: negative  Gastrointestinal: negative  Musculoskeletal:negative  Behavioral/Psych: negative  All others reviewed, negative    Recent Labs     11/26/21  0028 11/27/21  0444   WBC 8.4 5.5   HGB 10.1* 7.8*    279    143    112*   K 4.6 4.7   BUN 22 18   CREATININE 1.7* 1.6*   GLUCOSE 183* 101*   LABALBU 4.1  --    PROT 7.1  --    CALCIUM 9.5 8.8   MG  --  2.1   PHOS  --  2.9   BILITOT <0.2  --    ALKPHOS 128*  --    AST 20  --    ALT 9  --        Physical exam:   VITALS:  Blood pressure 122/60, pulse 55, temperature 98.2 °F (36.8 °C), temperature source Oral, resp. rate 16, weight 99 lb (44.9 kg), SpO2 96 %.   General appearance: alert, appears stated age and cooperative  Head: Normocephalic, without obvious abnormality, atraumatic  Eyes: PERRL, EOMI  Neck: no adenopathy, no carotid bruit, no JVD, supple, symmetrical, trachea midline and thyroid not enlarged, symmetric, no tenderness/mass/nodules  Lungs: clear to auscultation bilaterally  Heart: regular rate and rhythm,   Abdomen: soft, non tender; bowel sounds normal, not hyperactive; no hernias palpable  Extremities: extremities normal, atraumatic, no cyanosis or edema    ASSESSMENT:   Constipation, abdominal pain resolved, no bowel obstruction. Large hiatal hernia with much of the stomach in the chest will need to be fixed in the future but is not causing the constipation problems. Patel Ballesteros PLAN:  Myralax orally and repeat the Dulcolax suppository to clear the bowels. Signed: Dr. Taiwo Nicolas M.D.     Send copy of H&P to PCP, Jorge Choi MD

## 2021-11-28 VITALS
RESPIRATION RATE: 16 BRPM | WEIGHT: 99 LBS | DIASTOLIC BLOOD PRESSURE: 64 MMHG | TEMPERATURE: 98.8 F | SYSTOLIC BLOOD PRESSURE: 112 MMHG | OXYGEN SATURATION: 95 % | BODY MASS INDEX: 19.33 KG/M2 | HEART RATE: 74 BPM

## 2021-11-28 LAB
ANION GAP SERPL CALCULATED.3IONS-SCNC: 8 MMOL/L (ref 7–16)
BASOPHILS ABSOLUTE: 0.04 E9/L (ref 0–0.2)
BASOPHILS RELATIVE PERCENT: 0.6 % (ref 0–2)
BUN BLDV-MCNC: 17 MG/DL (ref 6–23)
CALCIUM SERPL-MCNC: 8.1 MG/DL (ref 8.6–10.2)
CHLORIDE BLD-SCNC: 112 MMOL/L (ref 98–107)
CO2: 22 MMOL/L (ref 22–29)
CREAT SERPL-MCNC: 1.8 MG/DL (ref 0.5–1)
EOSINOPHILS ABSOLUTE: 0.01 E9/L (ref 0.05–0.5)
EOSINOPHILS RELATIVE PERCENT: 0.1 % (ref 0–6)
GFR AFRICAN AMERICAN: 33
GFR NON-AFRICAN AMERICAN: 27 ML/MIN/1.73
GLUCOSE BLD-MCNC: 102 MG/DL (ref 74–99)
HCT VFR BLD CALC: 26.6 % (ref 34–48)
HEMOGLOBIN: 7.9 G/DL (ref 11.5–15.5)
IMMATURE GRANULOCYTES #: 0.03 E9/L
IMMATURE GRANULOCYTES %: 0.4 % (ref 0–5)
LYMPHOCYTES ABSOLUTE: 2.4 E9/L (ref 1.5–4)
LYMPHOCYTES RELATIVE PERCENT: 34.9 % (ref 20–42)
MCH RBC QN AUTO: 26.2 PG (ref 26–35)
MCHC RBC AUTO-ENTMCNC: 29.7 % (ref 32–34.5)
MCV RBC AUTO: 88.4 FL (ref 80–99.9)
MONOCYTES ABSOLUTE: 0.77 E9/L (ref 0.1–0.95)
MONOCYTES RELATIVE PERCENT: 11.2 % (ref 2–12)
NEUTROPHILS ABSOLUTE: 3.63 E9/L (ref 1.8–7.3)
NEUTROPHILS RELATIVE PERCENT: 52.8 % (ref 43–80)
PDW BLD-RTO: 16.6 FL (ref 11.5–15)
PLATELET # BLD: 292 E9/L (ref 130–450)
PMV BLD AUTO: 10.4 FL (ref 7–12)
POTASSIUM SERPL-SCNC: 4.6 MMOL/L (ref 3.5–5)
RBC # BLD: 3.01 E12/L (ref 3.5–5.5)
SODIUM BLD-SCNC: 142 MMOL/L (ref 132–146)
WBC # BLD: 6.9 E9/L (ref 4.5–11.5)

## 2021-11-28 PROCEDURE — 6360000002 HC RX W HCPCS: Performed by: INTERNAL MEDICINE

## 2021-11-28 PROCEDURE — G0378 HOSPITAL OBSERVATION PER HR: HCPCS

## 2021-11-28 PROCEDURE — 6370000000 HC RX 637 (ALT 250 FOR IP): Performed by: INTERNAL MEDICINE

## 2021-11-28 PROCEDURE — 36415 COLL VENOUS BLD VENIPUNCTURE: CPT

## 2021-11-28 PROCEDURE — 80048 BASIC METABOLIC PNL TOTAL CA: CPT

## 2021-11-28 PROCEDURE — 96372 THER/PROPH/DIAG INJ SC/IM: CPT

## 2021-11-28 PROCEDURE — 85025 COMPLETE CBC W/AUTO DIFF WBC: CPT

## 2021-11-28 RX ORDER — POLYETHYLENE GLYCOL 3350 17 G/17G
17 POWDER, FOR SOLUTION ORAL 2 TIMES DAILY
Qty: 527 G | Refills: 1 | Status: SHIPPED | OUTPATIENT
Start: 2021-11-28 | End: 2021-12-28

## 2021-11-28 RX ADMIN — SERTRALINE HYDROCHLORIDE 50 MG: 50 TABLET ORAL at 09:15

## 2021-11-28 RX ADMIN — METOPROLOL SUCCINATE 25 MG: 25 TABLET, EXTENDED RELEASE ORAL at 09:15

## 2021-11-28 RX ADMIN — LEVOTHYROXINE SODIUM 75 MCG: 0.07 TABLET ORAL at 05:28

## 2021-11-28 RX ADMIN — PANTOPRAZOLE SODIUM 40 MG: 40 TABLET, DELAYED RELEASE ORAL at 05:28

## 2021-11-28 RX ADMIN — POLYETHYLENE GLYCOL 3350 17 G: 17 POWDER, FOR SOLUTION ORAL at 09:15

## 2021-11-28 RX ADMIN — ENOXAPARIN SODIUM 30 MG: 100 INJECTION SUBCUTANEOUS at 09:15

## 2021-11-28 NOTE — DISCHARGE SUMMARY
Internal Medicine Progress Note     [facility]     Michael Almonte. ALEK Fam.CAneudyOAneudyI. Pola Puentes D.O., SJOJOEY Chapman D.O. Addis Maier, MSN, APRN, NP-C  Ava Carrera. Ansley Fuchs, MSN, APRN-CNP       Internal Medicine  Discharge Summary    NAME: Laura Love  :  1945  MRN:  [id number]  PCP:Buzz Bond MD  ADMITTED: 2021      DISCHARGED: 21    ADMITTING PHYSICIAN: Kvng Ordaz DO    CONSULTANT(S):   IP CONSULT TO GENERAL SURGERY     ADMITTING DIAGNOSIS:   SBO (small bowel obstruction) ([facility] [address] [K56.609]  Partial small bowel obstruction ([address] [K56.600]     DISCHARGE DIAGNOSES:   1. Intractable abdominal pain with radiographic evidence of an enlarging hiatal hernia with displacement of the gastric fundus  2. Multiple dilated loops of bowel likely compatible with partial obstruction with consideration for small bowel volvulus  3. Recent hospitalization in March in the setting of a small bowel obstruction requiring laparoscopic converted to open small bowel resection  4. Chronic, compensated diastolic congestive heart failure  5. Chronic kidney disease stage III  6. Hyperlipidemia  7. Hypothyroidism  8. Gastroesophageal reflux disease  9. Moderate protein calorie malnutrition      BRIEF HISTORY OF PRESENT ILLNESS:   Chuy Mcintosh is a 58-year-old female who presented to [address] [place] [address] emergency department for the evaluation of sudden onset abdominal pain that began late in the evening yesterday. The patient was previously hospitalized in March where she underwent laparoscopic converted to open small bowel resection in the setting of small bowel obstruction. She states she had been doing well up until yesterday when she developed the sudden onset abdominal pain. It is located in the epigastric region with radiation to the back.   Work-up in the emergency department revealed a large hiatal hernia that had increased in size with intrathoracic displacement of the fundus. There were also multiple dilated loops of small bowel possibly indicative of partial obstruction. She states her last bowel movement was yesterday morning and she has not passed any flatus since that time. She does not appear overtly uncomfortable during my examination. She states that she recently received a pain medication which has resolved her abdominal discomfort. The general surgery team has been asked to provide consultation the patient has been admitted to the hospital.    LABS[de-identified]  Lab Results   Component Value Date    WBC 6.9 11/28/2021    HGB 7.9 (L) 11/28/2021    HCT 26.6 (L) 11/28/2021     11/28/2021     11/28/2021    K 4.6 11/28/2021     (H) 11/28/2021    CREATININE 1.8 (H) 11/28/2021    BUN 17 11/28/2021    CO2 22 11/28/2021    GLUCOSE 102 (H) 11/28/2021    ALT 9 11/26/2021    AST 20 11/26/2021    INR 1.0 05/19/2013     Lab Results   Component Value Date    INR 1.0 05/19/2013    INR 0.9 02/01/2013    INR 1.0 06/08/2011    PROTIME 12.0 05/19/2013    PROTIME 11.5 02/01/2013    PROTIME 12.3 06/08/2011      Lab Results   Component Value Date    TSH 1.590 09/29/2020     Lab Results   Component Value Date    TRIG 126 09/30/2020    TRIG 195 (H) 06/09/2011     Lab Results   Component Value Date    HDL 83 09/30/2020    HDL 67.0 06/09/2011     Lab Results   Component Value Date    LDLCALC 94 09/30/2020    LDLCALC 97 06/09/2011     Lab Results   Component Value Date    LABA1C 6.1 (H) 09/30/2020       IMAGING:  CT ABDOMEN PELVIS WO CONTRAST Additional Contrast? Oral    Result Date: 11/26/2021  EXAMINATION: CT OF THE ABDOMEN AND PELVIS WITHOUT CONTRAST 11/26/2021 10:44 am TECHNIQUE: CT of the abdomen and pelvis was performed without the administration of intravenous contrast. Multiplanar reformatted images are provided for review.  Dose modulation, iterative reconstruction, and/or weight based adjustment of the mA/kV was utilized to reduce the radiation dose to as low as reasonably achievable. COMPARISON: Same day HISTORY: ORDERING SYSTEM PROVIDED HISTORY: sbo TECHNOLOGIST PROVIDED HISTORY: Reason for exam:->sbo Additional Contrast?->Oral FINDINGS: Oral contrast extends through the small bowel into the transverse colon level. There is prominent distal diverticulosis coli. There is a dilated loop of small bowel in the right lower quadrant. Apparent suture material along the margins of this suggesting postop etiology. To lesser degree proximal this there is mild small bowel prominence but improved Large hiatal hernia with apparent organo-axial malrotation without obstruction. Post hysterectomy. Left hip replacement. Low bone mineral density, scoliosis and degenerative changes in lumbar spine Small right pleural effusion and atelectasis/scar present. Limited organ evaluation without contrast.  Minimal ascites also appears improved. Improvement of small bowel dilation and contrast passes into the transverse colon, without significant obstruction. CT ABDOMEN PELVIS WO CONTRAST Additional Contrast? None    Result Date: 11/26/2021  EXAMINATION: CT OF THE ABDOMEN AND PELVIS WITHOUT CONTRAST 11/26/2021 12:44 am TECHNIQUE: CT of the abdomen and pelvis was performed without the administration of intravenous contrast. Multiplanar reformatted images are provided for review. Dose modulation, iterative reconstruction, and/or weight based adjustment of the mA/kV was utilized to reduce the radiation dose to as low as reasonably achievable. COMPARISON: March 29 HISTORY: ORDERING SYSTEM PROVIDED HISTORY: Abdominal pain, dry heaving TECHNOLOGIST PROVIDED HISTORY: Reason for exam:->Abdominal pain, dry heaving Additional Contrast?->None Decision Support Exception - unselect if not a suspected or confirmed emergency medical condition->Emergency Medical Condition (MA) FINDINGS: Lower Chest: Lung bases are free of infiltrates and effusions.  Organs: Large hiatal hernia increased in size in the interval since the prior examination with intrathoracic displacement of the gastric fundus. There is dilatation of the gastric fundus and body of the stomach. Gastric outlet obstruction or gastro paresis should be considered. Consider further evaluation with oral contrast. The liver, spleen, adrenals, pancreas and gallbladder are within normal limits. Punctate nonobstructive calcification in the left kidney measuring less than 2 mm. No obstructive uropathy. Trace perihepatic fluid, decreased in the interval since the prior examination. Bowel GI: Dilatation of multiple segments of small bowel in the lower abdomen and pelvis with air-fluid levels which may represent partial obstruction or ileus. Small bowel feces seen adjacent to anastomotic surgical changes in the right side of the pelvis which may represent a transition point. There is no free intraperitoneal air. There is apparent focal narrowing and rotatory configuration of small bowel in the right side of the pelvis. Small bowel volvulus cannot be excluded. Further evaluation with oral contrast would be helpful in further assessment of these findings. Normal appendix. Sigmoid diverticulosis with no evidence of diverticulitis. Pelvis: Prominent artifact in the pelvis secondary to left hip prosthesis. Urinary bladder is not distended. Status post hysterectomy. Peritoneum/Retroperitoneum: No abdominal aortic aneurysm or retroperitoneal adenopathy. Bones/Soft Tissues: No acute bony pathology. Large hiatal hernia increased in size in the interval since the prior examination with intrathoracic displacement of the gastric fundus. There is dilatation of the gastric fundus and body of the stomach. Gastric outlet obstruction or gastro paresis should be considered. Consider further evaluation with oral contrast. Punctate nonobstructive calcification in the left kidney measuring less than 2 mm. No obstructive uropathy.  Dilatation of multiple segments of small bowel in the lower abdomen and pelvis with air-fluid levels which may represent partial obstruction or ileus. Small bowel feces seen adjacent to anastomotic surgical changes in the right side of the pelvis which may represent a transition point. There is apparent focal narrowing and rotatory configuration of small bowel in the right side of the pelvis. Small bowel volvulus cannot be excluded. Further evaluation with oral contrast would be helpful in further assessment of these findings. Sigmoid diverticulosis with no evidence of diverticulitis. HOSPITAL COURSE:   Alfred Beltrán did well throughout the hospitalization. She presented with intractable abdominal pain and findings of an enlarging hiatal hernia with partial small bowel obstruction. Diet was slowly advanced throughout the hospitalization with IV fluid resuscitation. An aggressive bowel regimen was instituted. She began having adequate bowel movements with complete resolution of her abdominal discomfort. She was evaluated by the general surgery team with no plans for repeat surgical intervention. We discussed the need for more frequent smaller meals. She voiced understanding and agreement. She has been ambulatory and has been provided an appropriate bowel regimen upon discharge. She is acceptable for discharge home. BRIEF PHYSICAL EXAMINATION AND LABORATORIES ON DAY OF DISCHARGE:  VITALS:  BP (!) 116/50   Pulse 79   Temp 98.8 °F (37.1 °C) (Oral)   Resp 16   Wt 99 lb (44.9 kg)   SpO2 95%   BMI 19.33 kg/m²     HEENT:  PERRLA. EOMI. Sclera clear. Buccal mucosa moist.    Neck:  Supple. Trachea midline. No thyromegaly. No JVD. No bruits. Heart:  Rhythm regular, rate controlled. No murmurs. Lungs:  Symmetrical. Clear to auscultation bilaterally. No wheezes. No rhonchi. No rales. Abdomen: Soft. Non-tender. Non-distended. Bowel sounds positive. No organomegaly or masses.   No pain on palpation    Extremities:  Peripheral pulses present. No peripheral edema. No ulcers. Neurologic:  Alert x 3. No focal deficit. Cranial nerves grossly intact. Skin:  No petechia. No hemorrhage. No wounds. DISPOSITION:  The patient's condition is good. At this time the patient is without objective evidence of an acute process requiring continuing hospitalization or inpatient management. They are stable for discharge with outpatient follow-up. I have spoken with the patient and discussed the results of the current hospitalization, in addition to providing specific details for the plan of care and counseling regarding the diagnosis and prognosis. The plan has been discussed in detail and they are aware of the specific conditions for emergent return, as well as the importance of follow-up.   Their questions are answered at this time and they are agreeable with the plan for discharge to home    DISCHARGE MEDICATIONS:   Current Discharge Medication List           Details   polyethylene glycol (GLYCOLAX) 17 g packet Take 17 g by mouth 2 times daily  Qty: 527 g, Refills: 1              Details   ondansetron (ZOFRAN-ODT) 4 MG disintegrating tablet Take 1 tablet by mouth every 8 hours as needed for Nausea or Vomiting  Qty: 20 tablet, Refills: 0      calcium carbonate (TUMS) 500 MG chewable tablet Take 2 tablets by mouth daily Once in a while a couple days ago      aspirin 81 MG chewable tablet Take 1 tablet by mouth daily  Qty: 30 tablet, Refills: 3      ferrous sulfate (IRON 325) 325 (65 Fe) MG tablet Take 1 tablet by mouth 2 times daily (with meals)  Qty: 30 tablet, Refills: 3      metoprolol succinate (TOPROL XL) 25 MG extended release tablet Take 1 tablet by mouth daily  Qty: 30 tablet, Refills: 3      rOPINIRole (REQUIP) 1 MG tablet Take 1 tablet by mouth nightly  Qty: 90 tablet, Refills: 3      atorvastatin (LIPITOR) 20 MG tablet Take 1 tablet by mouth daily  Qty: 30 tablet, Refills: 3      vitamin B-6 (B-6) 50 MG tablet Take 1 tablet by mouth daily  Qty: 30 tablet, Refills: 3      butalbital-aspirin-caffeine-codeine (FIORINAL/CODEINE #3) -60-30 MG capsule Take 1 capsule by mouth every 4-6 hours as needed for Pain.      levothyroxine (SYNTHROID) 75 MCG tablet Take 75 mcg by mouth Daily      mirtazapine (REMERON) 30 MG tablet Take 30 mg by mouth nightly      pantoprazole (PROTONIX) 40 MG tablet Take 40 mg by mouth daily      sertraline (ZOLOFT) 50 MG tablet Take 50 mg by mouth daily             FOLLOW UP/INSTRUCTIONS:  · This patient is instructed to follow-up with her primary care physician. · Patient is instructed to follow-up with the consults listed above as directed by them. · she is instructed to resume home medications and take new medications as indicated in the list above. · If the patient has a recurrence of symptoms, she is instructed to go to the ED. Preparing for this patient's discharge, including paperwork, orders, instructions, and meeting with patient did require > 40 minutes.     Jacey Jenkins DO     11/28/2021  8:19 AM

## 2021-11-28 NOTE — PLAN OF CARE
Problem:  Bowel/Gastric:  Goal: Ability to achieve a regular elimination pattern will improve  Outcome: Met This Shift

## 2021-12-10 ENCOUNTER — HOSPITAL ENCOUNTER (OUTPATIENT)
Age: 76
Discharge: HOME | End: 2021-12-12
Payer: MEDICARE

## 2021-12-10 ENCOUNTER — HOSPITAL ENCOUNTER (OUTPATIENT)
Dept: GENERAL RADIOLOGY | Age: 76
Discharge: HOME | End: 2021-12-12
Payer: MEDICARE

## 2021-12-10 DIAGNOSIS — R10.9 STOMACH ACHE: ICD-10-CM

## 2021-12-10 PROCEDURE — 74018 RADEX ABDOMEN 1 VIEW: CPT

## 2022-09-13 ENCOUNTER — TELEPHONE (OUTPATIENT)
Dept: CARDIOLOGY | Age: 77
End: 2022-09-13

## 2022-09-13 NOTE — TELEPHONE ENCOUNTER
Left message to schedule echo and lexiscan stress test.  Electronically signed by Deanna Sanchez on 9/13/2022 at 10:45 AM

## 2022-11-09 ENCOUNTER — HOSPITAL ENCOUNTER (OUTPATIENT)
Dept: NUCLEAR MEDICINE | Age: 77
Discharge: HOME | End: 2022-11-09
Payer: MEDICARE

## 2022-11-09 ENCOUNTER — HOSPITAL ENCOUNTER (OUTPATIENT)
Dept: NON INVASIVE DIAGNOSTICS | Age: 77
Discharge: HOME | End: 2022-11-09
Payer: MEDICARE

## 2022-11-09 VITALS — BODY MASS INDEX: 19.44 KG/M2 | HEIGHT: 60 IN | WEIGHT: 99 LBS

## 2022-11-09 DIAGNOSIS — R07.89 OTHER CHEST PAIN: ICD-10-CM

## 2022-11-09 LAB
LV EF: 55 %
LV EF: 65 %
LVEF MODALITY: NORMAL
LVEF MODALITY: NORMAL

## 2022-11-09 PROCEDURE — 93306 TTE W/DOPPLER COMPLETE: CPT

## 2022-11-09 PROCEDURE — 6360000002 HC RX W HCPCS: Performed by: FAMILY MEDICINE

## 2022-11-09 PROCEDURE — 78452 HT MUSCLE IMAGE SPECT MULT: CPT

## 2022-11-09 PROCEDURE — 93016 CV STRESS TEST SUPVJ ONLY: CPT | Performed by: INTERNAL MEDICINE

## 2022-11-09 PROCEDURE — A9500 TC99M SESTAMIBI: HCPCS | Performed by: FAMILY MEDICINE

## 2022-11-09 PROCEDURE — 3430000000 HC RX DIAGNOSTIC RADIOPHARMACEUTICAL: Performed by: FAMILY MEDICINE

## 2022-11-09 PROCEDURE — 78452 HT MUSCLE IMAGE SPECT MULT: CPT | Performed by: INTERNAL MEDICINE

## 2022-11-09 PROCEDURE — A9500 TC99M SESTAMIBI: HCPCS | Performed by: RADIOLOGY

## 2022-11-09 PROCEDURE — 93017 CV STRESS TEST TRACING ONLY: CPT

## 2022-11-09 PROCEDURE — 3430000000 HC RX DIAGNOSTIC RADIOPHARMACEUTICAL: Performed by: RADIOLOGY

## 2022-11-09 PROCEDURE — 93018 CV STRESS TEST I&R ONLY: CPT | Performed by: INTERNAL MEDICINE

## 2022-11-09 RX ADMIN — Medication 10 MILLICURIE: at 11:26

## 2022-11-09 RX ADMIN — Medication 30 MILLICURIE: at 13:42

## 2022-11-09 RX ADMIN — REGADENOSON 0.4 MG: 0.08 INJECTION, SOLUTION INTRAVENOUS at 13:31

## 2022-11-09 NOTE — PROCEDURES
Procedure: Moon Hernández stress test     Ordering physician: Asmita Zamudio  Referring physician:Dr.Michael Prabhjot Varma    Indication: Chest Pain     Pretest evaluation no chest pain, no shortness of breath    Resting EKG showed: Sinus rhythm     Protocol: Patient was given 0.4mg of Lexiscan followed by Cardiolite injection    Heart rate response:   Resting heart rate: 75 BPM   Stress heart rate: 82 BPM     Blood pressure response:   Resting blood pressure: 137/83 mmHg   Stress blood pressure: 161/96 mmHg     Symptoms and signs: None     EKG changes:  Resting EKG: nonischemic   Stress EKG : nonischemic     Impression:   Clinical: nonischemic   EKG: nonischemic     Cardiolite injected and nuclear images are pending

## 2023-05-17 ENCOUNTER — HOSPITAL ENCOUNTER (OUTPATIENT)
Dept: GENERAL RADIOLOGY | Age: 78
Discharge: HOME | End: 2023-05-19
Payer: MEDICARE

## 2023-05-17 ENCOUNTER — HOSPITAL ENCOUNTER (OUTPATIENT)
Age: 78
Discharge: HOME | End: 2023-05-19
Payer: MEDICARE

## 2023-05-17 DIAGNOSIS — M79.602 LEFT ARM PAIN: ICD-10-CM

## 2023-05-17 PROCEDURE — 73060 X-RAY EXAM OF HUMERUS: CPT

## 2025-02-17 NOTE — ED PROVIDER NOTES
Behavorial Health Tech      Signature:  Brent Paige     regular rhythm. Heart sounds: Normal heart sounds. No murmur. Pulmonary:      Effort: Pulmonary effort is normal. No respiratory distress. Breath sounds: Normal breath sounds. No stridor, decreased air movement or transmitted upper airway sounds. No decreased breath sounds, wheezing, rhonchi or rales. Chest:      Chest wall: No tenderness. Abdominal:      General: Bowel sounds are normal. There is no distension. Palpations: Abdomen is soft. Tenderness: There is abdominal tenderness. There is no right CVA tenderness, left CVA tenderness, guarding or rebound. Comments: Abdomen soft with moderate mid abdominal epigastric area tenderness on palpation. Slight guarding in that area. The rest of the abdomen is nontender. No gross distention. No CVA tenderness. No pulsatile masses. No jaundice or icterus. Musculoskeletal:         General: No swelling, tenderness, deformity or signs of injury. Right lower leg: No edema. Left lower leg: No edema. Skin:     General: Skin is warm and dry. Coloration: Skin is not jaundiced or pale. Findings: No erythema or rash. Neurological:      General: No focal deficit present. Mental Status: She is alert and oriented to person, place, and time. GCS: GCS eye subscore is 4. GCS verbal subscore is 5. GCS motor subscore is 6. Cranial Nerves: Cranial nerves are intact. No cranial nerve deficit. Sensory: Sensation is intact. Motor: Motor function is intact. Coordination: Coordination is intact. Coordination normal.          Procedures     MDM     ED Course as of Mar 26 2005   Fri Mar 26, 2021   1751 Patient laying the bed in no distress, states pain is more controlled now. Work-up results are discussed with her as well as admission and observation for her abdominal pain. [NC]   4492 Patient now starting to complain of gradually increasing pain again.     [NC]   1913 Case discussed with Dr. Eilse oBrjas, detailed overview given, he will admit or consult on the patient depending on what surgical consultation would prefer. [NC]   2003 Case discussed with Dr. Antwon Elizabeth in the ER, detailed overview given, he sees the patient and CT and will admit. [NC]      ED Course User Index  [NC] Venkat Martini,         EKG Interpretation    Interpreted by emergency department physician    Rhythm: sinus bradycardia and 1 degree AV block  Rate: 48  Axis: normal  Ectopy: none  Conduction: 1 degree AV block  ST Segments: no acute change  T Waves: no acute change  Q Waves: none    Clinical Impression: sinus bradycardia and 1st degree AV block    Venkat Dunnegertrudis       ED Course as of Mar 26 2005   Fri Mar 26, 2021   1751 Patient laying the bed in no distress, states pain is more controlled now. Work-up results are discussed with her as well as admission and observation for her abdominal pain. [NC]   4610 Patient now starting to complain of gradually increasing pain again. [NC]   1913 Case discussed with Dr. Wolf Davis, detailed overview given, he will admit or consult on the patient depending on what surgical consultation would prefer. [NC]   2003 Case discussed with Dr. Antwon Elizabeth in the ER, detailed overview given, he sees the patient and CT and will admit. [NC]      ED Course User Index  [NC] Kranthi Marks DO       --------------------------------------------- PAST HISTORY ---------------------------------------------  Past Medical History:  has a past medical history of Acute on chronic diastolic (congestive) heart failure ([address] Arthritis, GERD (gastroesophageal reflux disease), H/O cardiovascular stress test, History of blood transfusion, Hyperlipidemia, Migraines, and Thyroid disease. Past Surgical History:  has a past surgical history that includes Hysterectomy; Endoscopy, colon, diagnostic; Colonoscopy; fracture surgery; other surgical history (Left, 2-7-13); and joint replacement.     Social History: reports that she has never smoked. She has never used smokeless tobacco. She reports that she does not drink alcohol or use drugs. Family History: family history includes Cancer in her sister. The patients home medications have been reviewed.     Allergies: Penicillins    -------------------------------------------------- RESULTS -------------------------------------------------    LABS:  Results for orders placed or performed during the hospital encounter of 03/26/21   CBC Auto Differential   Result Value Ref Range    WBC 9.9 4.5 - 11.5 E9/L    RBC 3.76 3.50 - 5.50 E12/L    Hemoglobin 12.3 11.5 - 15.5 g/dL    Hematocrit 37.0 34.0 - 48.0 %    MCV 98.4 80.0 - 99.9 fL    MCH 32.7 26.0 - 35.0 pg    MCHC 33.2 32.0 - 34.5 %    RDW 13.5 11.5 - 15.0 fL    Platelets 294 705 - 440 E9/L    MPV 10.5 7.0 - 12.0 fL    Neutrophils % 86.3 (H) 43.0 - 80.0 %    Immature Granulocytes % 0.5 0.0 - 5.0 %    Lymphocytes % 8.7 (L) 20.0 - 42.0 %    Monocytes % 4.1 2.0 - 12.0 %    Eosinophils % 0.0 0.0 - 6.0 %    Basophils % 0.4 0.0 - 2.0 %    Neutrophils Absolute 8.50 (H) 1.80 - 7.30 E9/L    Immature Granulocytes # 0.05 E9/L    Lymphocytes Absolute 0.86 (L) 1.50 - 4.00 E9/L    Monocytes Absolute 0.40 0.10 - 0.95 E9/L    Eosinophils Absolute 0.00 (L) 0.05 - 0.50 E9/L    Basophils Absolute 0.04 0.00 - 0.20 E9/L   Comprehensive Metabolic Panel   Result Value Ref Range    Sodium 144 132 - 146 mmol/L    Potassium 4.7 3.5 - 5.0 mmol/L    Chloride 109 (H) 98 - 107 mmol/L    CO2 21 (L) 22 - 29 mmol/L    Anion Gap 14 7 - 16 mmol/L    Glucose 197 (H) 74 - 99 mg/dL    BUN 21 8 - 23 mg/dL    CREATININE 1.3 (H) 0.5 - 1.0 mg/dL    GFR Non-African American 40 >=60 mL/min/1.73    GFR African American 48     Calcium 8.8 8.6 - 10.2 mg/dL    Total Protein 6.7 6.4 - 8.3 g/dL    Albumin 4.0 3.5 - 5.2 g/dL    Total Bilirubin <0.2 0.0 - 1.2 mg/dL    Alkaline Phosphatase 125 (H) 35 - 104 U/L    ALT 8 0 - 32 U/L    AST 13 0 - 31 U/L   Lipase   Result Value Ref Range    Lipase 15 13 - 60 U/L   Urinalysis   Result Value Ref Range    Color, UA Yellow Straw/Yellow    Clarity, UA Clear Clear    Glucose, Ur Negative Negative mg/dL    Bilirubin Urine Negative Negative    Ketones, Urine Negative Negative mg/dL    Specific Gravity, UA >=1.030 1.005 - 1.030    Blood, Urine TRACE (A) Negative    pH, UA 5.0 5.0 - 9.0    Protein, UA TRACE Negative mg/dL    Urobilinogen, Urine 0.2 <2.0 E.U./dL    Nitrite, Urine Negative Negative    Leukocyte Esterase, Urine SMALL (A) Negative   Troponin   Result Value Ref Range    Troponin <0.01 0.00 - 0.03 ng/mL   Microscopic Urinalysis   Result Value Ref Range    WBC, UA 5-10 (A) 0 - 5 /HPF    RBC, UA NONE 0 - 2 /HPF    Epithelial Cells, UA MODERATE /HPF    Bacteria, UA FEW (A) None Seen /HPF   Lactic Acid, Plasma   Result Value Ref Range    Lactic Acid 1.5 0.5 - 2.2 mmol/L   EKG 12 Lead   Result Value Ref Range    Ventricular Rate 48 BPM    Atrial Rate 48 BPM    P-R Interval 212 ms    QRS Duration 78 ms    Q-T Interval 450 ms    QTc Calculation (Bazett) 402 ms    P Axis 70 degrees    R Axis 13 degrees    T Axis 57 degrees       RADIOLOGY:  CT ABDOMEN PELVIS W IV CONTRAST Additional Contrast? None   Final Result   1. Multiple edematous, mildly distended loops of small bowel located in   central pelvis suggestive of nonspecific infectious, inflammatory, or   ischemic enteritis versus bowel obstruction. Short-term follow-up   recommended. 2.  Edematous changes associated with left colon and sigmoid colon suggestive   of infectious, inflammatory, or ischemic colitis. 3.  Small free fluid layers in mesentery and central pelvis. 4.  Diverticulosis. 5.  Cystic lesion associated with pancreatic head. MRI with and without   contrast could be helpful for further evaluation. 6.  Nonobstructing 2 mm left renal calculus.                  ------------------------- NURSING NOTES AND VITALS REVIEWED ---------------------------  Date / Time Roomed:  3/26/2021  2:36 PM  ED Bed Assignment:  14/14    The nursing notes within the ED encounter and vital signs as below have been reviewed. Patient Vitals for the past 24 hrs:   BP Temp Pulse Resp SpO2 Weight   03/26/21 1758 134/82  61 18 96 %    03/26/21 1434 132/70 98.3 °F (36.8 °C) 53 18 97 % 105 lb (47.6 kg)       Oxygen Saturation Interpretation: Normal        Counseling:  I have spoken with the patient and discussed todays results, in addition to providing specific details for the plan of care and counseling regarding the diagnosis and prognosis. Their questions are answered at this time and they are agreeable with the plan of admission.    --------------------------------- ADDITIONAL PROVIDER NOTES ---------------------------------  Consultations: This patient's ED course included: a personal history and physicial examination, re-evaluation prior to disposition, multiple bedside re-evaluations and IV medications    This patient has remained hemodynamically stable during their ED course. Diagnosis:  1. Small bowel obstruction (HCC)        Disposition:  Patient's disposition: Admit to med/surg floor  Patient's condition is stable.          Juliana Fritz DO  03/26/21 2006

## 2025-06-03 LAB
ALBUMIN SERPL-MCNC: 3.9 G/DL (ref 3.5–5.2)
ALP SERPL-CCNC: 111 U/L (ref 35–104)
ALT SERPL-CCNC: 12 U/L (ref 0–35)
ANION GAP SERPL CALCULATED.3IONS-SCNC: 14 MMOL/L (ref 7–16)
AST SERPL-CCNC: 20 U/L (ref 0–35)
BASOPHILS # BLD: 0.05 K/UL (ref 0–0.2)
BASOPHILS NFR BLD: 1 % (ref 0–2)
BILIRUB SERPL-MCNC: <0.2 MG/DL (ref 0–1.2)
BUN SERPL-MCNC: 28 MG/DL (ref 8–23)
CALCIUM SERPL-MCNC: 9.2 MG/DL (ref 8.8–10.2)
CHLORIDE SERPL-SCNC: 110 MMOL/L (ref 98–107)
CO2 SERPL-SCNC: 18 MMOL/L (ref 22–29)
CREAT SERPL-MCNC: 1.5 MG/DL (ref 0.5–1)
CRP SERPL HS-MCNC: 58.1 MG/L (ref 0–5)
EOSINOPHIL # BLD: 0.01 K/UL (ref 0.05–0.5)
EOSINOPHILS RELATIVE PERCENT: 0 % (ref 0–6)
ERYTHROCYTE [DISTWIDTH] IN BLOOD BY AUTOMATED COUNT: 14 % (ref 11.5–15)
ERYTHROCYTE [SEDIMENTATION RATE] IN BLOOD BY WESTERGREN METHOD: 33 MM/HR (ref 0–20)
GFR, ESTIMATED: 35 ML/MIN/1.73M2
GLUCOSE SERPL-MCNC: 101 MG/DL (ref 74–99)
HCT VFR BLD AUTO: 38.1 % (ref 34–48)
HGB BLD-MCNC: 12.6 G/DL (ref 11.5–15.5)
IMM GRANULOCYTES # BLD AUTO: 0.03 K/UL (ref 0–0.58)
IMM GRANULOCYTES NFR BLD: 1 % (ref 0–5)
LYMPHOCYTES NFR BLD: 1.54 K/UL (ref 1.5–4)
LYMPHOCYTES RELATIVE PERCENT: 29 % (ref 20–42)
MCH RBC QN AUTO: 31.4 PG (ref 26–35)
MCHC RBC AUTO-ENTMCNC: 33.1 G/DL (ref 32–34.5)
MCV RBC AUTO: 95 FL (ref 80–99.9)
MONOCYTES NFR BLD: 0.55 K/UL (ref 0.1–0.95)
MONOCYTES NFR BLD: 10 % (ref 2–12)
NEUTROPHILS NFR BLD: 59 % (ref 43–80)
NEUTS SEG NFR BLD: 3.14 K/UL (ref 1.8–7.3)
PLATELET # BLD AUTO: 239 K/UL (ref 130–450)
PMV BLD AUTO: 10.8 FL (ref 7–12)
POTASSIUM SERPL-SCNC: 4.3 MMOL/L (ref 3.5–5.1)
PROT SERPL-MCNC: 6.6 G/DL (ref 6.4–8.3)
RBC # BLD AUTO: 4.01 M/UL (ref 3.5–5.5)
SODIUM SERPL-SCNC: 141 MMOL/L (ref 136–145)
TSH SERPL DL<=0.05 MIU/L-ACNC: 0.94 UIU/ML (ref 0.27–4.2)
URATE SERPL-MCNC: 9.2 MG/DL (ref 2.4–5.7)
WBC OTHER # BLD: 5.3 K/UL (ref 4.5–11.5)

## 2025-06-04 LAB — ANA SER QL IA: NEGATIVE

## 2025-06-06 LAB
HLA B27: NEGATIVE
LYME ANTIBODY: 0.06

## 2025-07-28 ENCOUNTER — HOSPITAL ENCOUNTER (OUTPATIENT)
Dept: ULTRASOUND IMAGING | Age: 80
Discharge: HOME | End: 2025-07-30
Payer: MEDICARE

## 2025-07-28 DIAGNOSIS — M79.604 PAIN IN RIGHT LEG: ICD-10-CM

## 2025-07-28 PROCEDURE — 93971 EXTREMITY STUDY: CPT

## 2025-08-09 ENCOUNTER — HOSPITAL ENCOUNTER (EMERGENCY)
Age: 80
Discharge: HOME | End: 2025-08-10
Attending: EMERGENCY MEDICINE
Payer: MEDICARE

## 2025-08-09 ENCOUNTER — APPOINTMENT (OUTPATIENT)
Dept: CT IMAGING | Age: 80
End: 2025-08-09
Payer: MEDICARE

## 2025-08-09 ENCOUNTER — APPOINTMENT (OUTPATIENT)
Dept: GENERAL RADIOLOGY | Age: 80
End: 2025-08-09
Payer: MEDICARE

## 2025-08-09 VITALS
HEIGHT: 60 IN | BODY MASS INDEX: 20.03 KG/M2 | TEMPERATURE: 97.5 F | HEART RATE: 73 BPM | OXYGEN SATURATION: 97 % | SYSTOLIC BLOOD PRESSURE: 147 MMHG | WEIGHT: 102 LBS | DIASTOLIC BLOOD PRESSURE: 78 MMHG
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
## 2025-08-09 DIAGNOSIS — S51.812A SKIN TEAR OF LEFT FOREARM WITHOUT COMPLICATION, INITIAL ENCOUNTER: ICD-10-CM

## 2025-08-09 DIAGNOSIS — S01.81XA FOREHEAD LACERATION, INITIAL ENCOUNTER: Primary | ICD-10-CM

## 2025-08-09 PROCEDURE — 73070 X-RAY EXAM OF ELBOW: CPT

## 2025-08-09 PROCEDURE — 72125 CT NECK SPINE W/O DYE: CPT

## 2025-08-09 PROCEDURE — 70450 CT HEAD/BRAIN W/O DYE: CPT

## 2025-08-09 PROCEDURE — 12002 RPR S/N/AX/GEN/TRNK2.6-7.5CM: CPT

## 2025-08-09 PROCEDURE — 99284 EMERGENCY DEPT VISIT MOD MDM: CPT

## 2025-08-09 RX ORDER — LIDOCAINE HYDROCHLORIDE 10 MG/ML
5 INJECTION, SOLUTION INFILTRATION; PERINEURAL ONCE
Status: DISCONTINUED | OUTPATIENT
Start: 2025-08-09 | End: 2025-08-10 | Stop reason: HOSPADM

## 2025-08-09 ASSESSMENT — PAIN SCALES - GENERAL: PAINLEVEL_OUTOF10: 10

## 2025-08-09 ASSESSMENT — PAIN DESCRIPTION - LOCATION: LOCATION: HEAD

## 2025-08-09 ASSESSMENT — PAIN - FUNCTIONAL ASSESSMENT: PAIN_FUNCTIONAL_ASSESSMENT: 0-10

## (undated) DEVICE — PUMP SUC IRR TBNG L10FT W/ HNDPC ASSEMB STRYKEFLOW 2

## (undated) DEVICE — SHEET,DRAPE,40X58,STERILE: Brand: MEDLINE

## (undated) DEVICE — PMI PTFE COATED LAPAROSCOPIC WIRE L-HOOK 44 CM: Brand: PMI

## (undated) DEVICE — STAPLER SKIN L440MM 32MM LNG 12 FIRING B FRM PWR + GRIPPING

## (undated) DEVICE — GARMENT,MEDLINE,DVT,INT,CALF,MED, GEN2: Brand: MEDLINE

## (undated) DEVICE — COVER,LIGHT HANDLE,FLX,1/PK: Brand: MEDLINE INDUSTRIES, INC.

## (undated) DEVICE — 40586 ADVANCED TRENDELENBURG POSITIONING KIT: Brand: 40586 ADVANCED TRENDELENBURG POSITIONING KIT

## (undated) DEVICE — SUTURE BAG: Brand: DEVON

## (undated) DEVICE — NDL CNTR 40CT FM MAG: Brand: MEDLINE INDUSTRIES, INC.

## (undated) DEVICE — Z DISCONTINUED SUGG SUB 2622703 KIT OST L12IN FLNG DIA70MM ST TRNSPAR TWO PC MOLD

## (undated) DEVICE — STAPLER INT L75MM CUT LN L73MM STPL LN L77MM BLU B FRM 8

## (undated) DEVICE — YANKAUER,BULB TIP,W/O VENT,RIGID,STERILE: Brand: MEDLINE

## (undated) DEVICE — SPONGE,LAP,12"X12",XR,ST,5/PK,40PK/CS: Brand: MEDLINE

## (undated) DEVICE — GOWN,SIRUS,NONRNF,SETINSLV,XL,20/CS: Brand: MEDLINE

## (undated) DEVICE — COVER,TABLE,44X90,STERILE: Brand: MEDLINE

## (undated) DEVICE — TRAY PROCED CUSTOM GASTROINTESTINAL

## (undated) DEVICE — GLOVE ORANGE PI 7 1/2   MSG9075

## (undated) DEVICE — HYDROPHILIC COATED RED RUBBER URETHRAL CATHETER, SMOOTH ROUNDED TIP, 20 FR (6.7 MM): Brand: DOVER

## (undated) DEVICE — ELECTRODE PT RET AD L9FT HI MOIST COND ADH HYDRGEL CORDED

## (undated) DEVICE — PACK SURG LAP CHOLE CUSTOM

## (undated) DEVICE — DOUBLE BASIN SET: Brand: MEDLINE INDUSTRIES, INC.

## (undated) DEVICE — YANKAUER,POOLE TIP,STERILE,50/CS: Brand: MEDLINE

## (undated) DEVICE — APPLIER CLP M/L SHFT DIA5MM 15 LIG LIGAMAX 5

## (undated) DEVICE — RELOAD STPL L60MM H1-2.6MM MESENTERY THN TISS WHT 6 ROW

## (undated) DEVICE — KIT BEDSIDE REVITAL OX 500ML

## (undated) DEVICE — GAUZE,SPONGE,4"X4",16PLY,STRL,LF,10/TRAY: Brand: MEDLINE

## (undated) DEVICE — BLADE ES ELASTOMERIC COAT INSUL DURABLE BEND UPTO 90DEG

## (undated) DEVICE — 3M™ IOBAN™ 2 ANTIMICROBIAL INCISE DRAPE 6640EZ: Brand: IOBAN™ 2

## (undated) DEVICE — SEALER TISS L45CM ADV BPLR STR TIP LAP APPRCH ENSEAL G2

## (undated) DEVICE — APPLICATOR MEDICATED 26 CC SOLUTION HI LT ORNG CHLORAPREP

## (undated) DEVICE — SEALER ENDOSCP NANO COAT OPN DIV CRV L JAW LIGASURE IMPACT

## (undated) DEVICE — NEEDLE HYPO 25GA L1.5IN BLU POLYPR HUB S STL REG BVL STR

## (undated) DEVICE — STAPLER EXT 65MM S STL AUTO DISP PURSTRING

## (undated) DEVICE — BASIC SINGLE BASIN 1-LF: Brand: MEDLINE INDUSTRIES, INC.

## (undated) DEVICE — GENERATOR ELECSURG FORCETRAID

## (undated) DEVICE — Z INACTIVE USE 2660664 SOLUTION IRRIG 3000ML 0.9% SOD CHL USP UROMATIC PLAS CONT

## (undated) DEVICE — TOWEL,OR,DSP,ST,BLUE,STD,6/PK,12PK/CS: Brand: MEDLINE

## (undated) DEVICE — SYRINGE MED 10ML TRNSLUC BRL PLUNG BLK MRK POLYPR CTRL

## (undated) DEVICE — SET MAJOR INSTR HOUSE

## (undated) DEVICE — APPLIER CLP L SHFT DIA12MM 20 ROT MULT LIGACLP

## (undated) DEVICE — CAMERA STRYKER 1488 HD GEN

## (undated) DEVICE — GAUZE,SPONGE,4"X4",16PLY,XRAY,STRL,LF: Brand: MEDLINE

## (undated) DEVICE — TROCAR: Brand: KII SLEEVE

## (undated) DEVICE — ACCESS PLATFORM FOR MINIMALLY INVASIVE SURGERY: Brand: GELPOINT® ADVANCED ACCESS PLATFORM

## (undated) DEVICE — TUBING, SUCTION, 1/4" X 10', STRAIGHT: Brand: MEDLINE

## (undated) DEVICE — RELOAD STPL L75MM OPN H3.8MM CLS 1.5MM WIRE DIA0.2MM REG

## (undated) DEVICE — TOTAL TRAY, 16FR 10ML SIL FOLEY, URN: Brand: MEDLINE

## (undated) DEVICE — SYRINGE IRRIG 60ML SFT PLIABLE BLB EZ TO GRP 1 HND USE W/

## (undated) DEVICE — INSUFFLATION NEEDLE TO ESTABLISH PNEUMOPERITONEUM.: Brand: INSUFFLATION NEEDLE

## (undated) DEVICE — MEDI-VAC YANKAUER SUCTION HANDLE: Brand: CARDINAL HEALTH

## (undated) DEVICE — MARKER,SKIN,WI/RULER AND LABELS: Brand: MEDLINE

## (undated) DEVICE — [HIGH FLOW INSUFFLATOR,  DO NOT USE IF PACKAGE IS DAMAGED,  KEEP DRY,  KEEP AWAY FROM SUNLIGHT,  PROTECT FROM HEAT AND RADIOACTIVE SOURCES.]: Brand: PNEUMOSURE

## (undated) DEVICE — PLUMEPORT LAPAROSCOPIC SMOKE FILTRATION DEVICE: Brand: PLUMEPORT ACTIV

## (undated) DEVICE — TROCAR: Brand: KII FIOS FIRST ENTRY

## (undated) DEVICE — SPONGE LAP W18XL18IN WHT COT 4 PLY FLD STRUNG RADPQ DISP ST